# Patient Record
Sex: FEMALE | Race: OTHER | NOT HISPANIC OR LATINO | Employment: OTHER | URBAN - METROPOLITAN AREA
[De-identification: names, ages, dates, MRNs, and addresses within clinical notes are randomized per-mention and may not be internally consistent; named-entity substitution may affect disease eponyms.]

---

## 2018-11-21 ENCOUNTER — APPOINTMENT (EMERGENCY)
Dept: CT IMAGING | Facility: HOSPITAL | Age: 83
End: 2018-11-21
Payer: MEDICARE

## 2018-11-21 ENCOUNTER — APPOINTMENT (EMERGENCY)
Dept: RADIOLOGY | Facility: HOSPITAL | Age: 83
End: 2018-11-21
Payer: MEDICARE

## 2018-11-21 ENCOUNTER — HOSPITAL ENCOUNTER (EMERGENCY)
Facility: HOSPITAL | Age: 83
Discharge: HOME | End: 2018-11-21
Attending: EMERGENCY MEDICINE | Admitting: EMERGENCY MEDICINE
Payer: MEDICARE

## 2018-11-21 VITALS
WEIGHT: 115.74 LBS | RESPIRATION RATE: 20 BRPM | DIASTOLIC BLOOD PRESSURE: 84 MMHG | TEMPERATURE: 98.5 F | OXYGEN SATURATION: 96 % | SYSTOLIC BLOOD PRESSURE: 138 MMHG | HEART RATE: 60 BPM

## 2018-11-21 DIAGNOSIS — S42.212A CLOSED FRACTURE OF NECK OF LEFT HUMERUS, INITIAL ENCOUNTER: Primary | ICD-10-CM
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
## 2018-11-21 PROCEDURE — 71045 X-RAY EXAM CHEST 1 VIEW: CPT

## 2018-11-21 PROCEDURE — 70450 CT HEAD/BRAIN W/O DYE: CPT

## 2018-11-21 PROCEDURE — 73060 X-RAY EXAM OF HUMERUS: CPT

## 2018-11-21 PROCEDURE — 99284 EMERGENCY DEPT VISIT MOD MDM: CPT

## 2018-11-21 PROCEDURE — 73030 X-RAY EXAM OF SHOULDER: CPT

## 2018-11-21 PROCEDURE — 72125 CT NECK SPINE W/O DYE: CPT

## 2018-11-21 RX ORDER — ACETAMINOPHEN 325 MG/1
975 TABLET ORAL ONCE
Status: DISCONTINUED | OUTPATIENT
Start: 2018-11-21 | End: 2018-11-21 | Stop reason: HOSPADM

## 2018-11-21 NOTE — ED PROVIDER NOTES
History  Chief Complaint   Patient presents with    Arm Injury     Pt presents to ER with c/o's LUE pain, pt reports falling early this morning & sustaining injury to LUE, LUE grossly edematous & ecchymotic, + neurovascular checks  Generally healthy appearing frail elderly female presents in no distress  35-year-old female with a history of hypertension presenting with her son for evaluation of left arm injury  Patient is very well for stated age independent, she lives alone although her son lives right next door and checks on her frequently, this morning around 5 o'clock she went to get out of bed she states that she stumbled and fell landing on her left shoulder she does not believe that she struck her head, she had immediate pain discomfort localized to her proximal left arm, she denies losing conscious or syncopizing and she was able to get up unassisted after less than 5 minutes  Patient's family was home during the day, she contact her son brought the emergency department for evaluation of left arm injury  Patient's area of concern is her left shoulder and humerus, she has mild pain it radiates into her posterior back and neck and down into her distal arm is worse when she tries to move in abduct the arm this reproduces her symptoms exactly is better at rest she has not taken anything for this she denies having headache weakness paresthesias or anesthesia auditory visual or balance complaint she denies having chest pain cough shortness of breath nausea vomiting anorexia change in bowels or bladder other muscle skeletal complaint or injury  Complete review systems otherwise negative as noted  None       Past Medical History:   Diagnosis Date    Disease of thyroid gland     Hypertension        History reviewed  No pertinent surgical history  History reviewed  No pertinent family history  I have reviewed and agree with the history as documented      Social History   Substance Use Topics    Smoking status: Not on file    Smokeless tobacco: Not on file    Alcohol use Not on file        Review of Systems   Constitutional: Negative for activity change, appetite change, chills and fever  HENT: Negative for rhinorrhea and sore throat  Eyes: Negative for photophobia and pain  Respiratory: Negative for cough and shortness of breath  Cardiovascular: Negative for chest pain and palpitations  Gastrointestinal: Negative for abdominal pain, diarrhea, nausea and vomiting  Genitourinary: Negative for dysuria, frequency and urgency  Musculoskeletal: Positive for joint swelling and neck stiffness  Negative for arthralgias, back pain, myalgias and neck pain  Left shoulder pain   Skin: Positive for color change  Negative for rash and wound  Neurological: Negative for dizziness, weakness, light-headedness, numbness and headaches  Hematological: Negative for adenopathy  Does not bruise/bleed easily  Psychiatric/Behavioral: Negative for agitation and behavioral problems  All other systems reviewed and are negative  Physical Exam  Physical Exam   Constitutional: She is oriented to person, place, and time  She appears well-developed and well-nourished  No distress  Very well for stated age, sitting upright pleasant conversational no acute distress son at bedside   HENT:   Head: Normocephalic and atraumatic  Right Ear: External ear normal    Left Ear: External ear normal    Mouth/Throat: Oropharynx is clear and moist    Head neck and face are atraumatic and nontender   Eyes: Pupils are equal, round, and reactive to light  EOM are normal    Neck: Normal range of motion  Neck supple  No tracheal deviation present  Cardiovascular: Normal rate, regular rhythm and normal heart sounds  Exam reveals no gallop and no friction rub  No murmur heard  Pulmonary/Chest: Effort normal and breath sounds normal  She has no wheezes  She has no rales  Abdominal: Soft   Bowel sounds are normal  She exhibits no distension  There is no tenderness  There is no rebound and no guarding  Musculoskeletal:   Complete head-to-toe musculoskeletal exam significant for moderate ecchymosis/bruising over her left proximal humerus shoulder there is no discrete tenderness over the left clavicle or scapula there is no tenderness over her distal humerus elbow forearm wrist or hand she has 2+ symmetric pulses the distal arms neurovascularly intact including ain, pin, sensation all 3 distributions in the skin is closed circumferentially around her left proximal humerus injury, she has some mild tenderness in her cervical paraspinal musculature but no midline cervical thoracic or lumbar tenderness the remainder of her back chest abdomen or atraumatic and nontender she has full active and passive range of motion of her right upper extremity bilateral lower extremities without additional pain tenderness or injury noted no other acute ischemic infectious inflammatory traumatic findings on exam   Neurological: She is alert and oriented to person, place, and time  No cranial nerve deficit  She exhibits normal muscle tone  Coordination normal    Skin: Skin is warm and dry  No rash noted  Psychiatric: She has a normal mood and affect  Her behavior is normal    Nursing note and vitals reviewed        Vital Signs  ED Triage Vitals [11/21/18 1749]   Temperature Pulse Respirations Blood Pressure SpO2   98 5 °F (36 9 °C) 60 20 138/84 96 %      Temp Source Heart Rate Source Patient Position - Orthostatic VS BP Location FiO2 (%)   Oral Monitor Lying Right arm --      Pain Score       --           Vitals:    11/21/18 1749   BP: 138/84   Pulse: 60   Patient Position - Orthostatic VS: Lying       Visual Acuity      ED Medications  Medications - No data to display    Diagnostic Studies  Results Reviewed     None                 XR chest 1 view   ED Interpretation by Harlan Macias DO (11/21 1907)   Left shoulder fracture no other acute abnormality      Final Result by Franco Camejo DO (11/21 2050)      Enlargement of the aortic arch and descending thoracic aorta suspicious for aneurysmal dilatation  Computed tomography of the chest recommended  Humeral head/neck fracture  See separate dedicated images  The study was marked in [facility] for immediate notification  Workstation performed: [id number]         XR shoulder 2+ views LEFT   ED Interpretation by Jinx Nyhan, DO (11/21 1908)   Left shoulder fracture, located      Final Result by Franco Camejo DO (11/21 2049)      Comminuted displaced humeral head/neck fracture  Workstation performed: [id number]         XR humerus LEFT   ED Interpretation by Jinx Nyhan, DO (11/21 1908)   Left shoulder fracture      Final Result by Franco Camejo DO (11/21 2048)      Comminuted displaced humeral neck fracture  Multiple fractures of the humeral head  Workstation performed: [id number]         CT spine cervical without contrast   Final Result by Franco Camejo DO (11/21 1916)      Cervical degenerative change  No acute osseous abnormality  Workstation performed: [id number]         CT head without contrast   Final Result by Franco Camejo DO (11/21 1913)      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 with no evidence of hemorrhage or acute ischemia  Dolichoectasia of the vertebrobasilar system  1 cm meningioma in the left insular region  Diffuse sinus disease including an air-fluid level within the left maxillary sinus which may represent an acute component of sinusitis                    Workstation performed: [id number]                    Procedures  Procedures       Phone Contacts  ED Phone Contact    ED Course  ED Course as of Nov 24 1943 Wed Nov 21, 2018 2015 Patient offered admission multiple times, she makes her own decisions she is able to ambulate around the department that discomfort she has no pain currently declined Tylenol, patient's son reports that she makes her own decisions has no other injuries complaints or concerns and feels safe going home for now after discussion will put in referral for ambulatory home health orthopedic follow-up close return follow-up instruction all in agreement to plan    2016 Left shoulder immobilizer applied by technician neurovascularly intact after evaluation by myself                                MDM  Number of Diagnoses or Management Options  Closed fracture of neck of left humerus, initial encounter:   Fall, initial encounter:   Diagnosis management comments: 55-year-old female mechanical fall earlier this morning landing on left shoulder does not believe she struck her head estimates she was on the ground for several minutes able to get up off the ground unassisted presents later this afternoon with her son for evaluation as he was able to bring her to the hospital complaining of left shoulder pain, radiates into her immediate posterior shoulder and neck is worse with movement it is better with rest she has no weakness paresthesias or anesthesia she has no headache or neurologic symptoms she denies syncopizinf  or being on the ground for prolonged period of time, on exam here she is afebrile normal vital signs she is clinically well-appearing, she is comfortable her exam is otherwise as noted, reviewed x-rays at bedside with patient and family, placed in left shoulder immobilizer by technician neurovascularly intact after evaluation by myself patient able to ambulate unassisted and wishes to go home despite offering admission multiple times, son adds that he lives next door and she makes her own decisions currently, patient is declining pain medication home health referral will be made for assistance at home, orthopedic follow-up very close return instructions at any time should she developed worsening or other concerning symptoms or fell at home, patient family agreeable plan    CritCare Time    Disposition  Final diagnoses:   Closed fracture of neck of left humerus, initial encounter   Fall, initial encounter     Time reflects when diagnosis was documented in both MDM as applicable and the Disposition within this note     Time User Action Codes Description Comment    11/21/2018  7:51 PM Lore Odell Melvin [[id number]] Closed fracture of neck of left humerus, initial encounter     11/21/2018  7:51 PM Marcos Model Olguin Chimera  XXXA] Fall, initial encounter       ED Disposition     ED Disposition Condition Comment    Discharge  Pennie Sneed discharge to home/self care  Condition at discharge: Good        Follow-up Information     Follow up With Specialties Details Why Contact Info Additional Information    [facility] Emergency [facility]  If symptoms worsen [address] [facility]  [phone] [place] ED, [address], [place], [address] [facility] [place] [facility] In 3 days  [address] [facility] [id number]  [address], [address], [place], [place], [address]          There are no discharge medications for this patient  Outpatient Discharge Orders  Ambulatory Referral to Home Health   Standing Status: Future  Standing Exp   Date: 05/21/19         ED Provider  Electronically Signed by           Abundio Dela Cruz DO  11/24/18 1941

## 2018-11-22 NOTE — DISCHARGE INSTRUCTIONS
Please return if she has pain not controlled home her arms number week she has other injuries other falls you have any concerns at all otherwise please follow up with home health referral and please stay Orthopedics early next week for further evaluation management treatment of her left arm fracture      Arm Fracture in Adults   WHAT Nehemias:   An arm fracture is a crack or break in one or more of the bones in your arm  An arm fracture may be caused by a fall onto an outstretched hand  It may also be caused by trauma from a car accident or a sports injury  Osteoporosis (brittle bones) can increase your risk for a fracture  DISCHARGE INSTRUCTIONS:   Return to the emergency department if:   · The pain in your injured arm does not get better or gets worse, even after you rest and take medicine  · Your injured arm, hand, or fingers feel numb  · Your arm is swollen, red, and feels warm  · Your skin over the arm fracture is swollen, cold, or pale  · You cannot move your arm, hand, or fingers  Contact your healthcare provider if:   · You have a fever  · Your brace or splint becomes wet, damaged, or comes off  · You have questions or concerns about your injury, treatment, or care  Medicines:   · NSAIDs , such as ibuprofen, help decrease swelling and pain  This medicine is available with or without a doctor's order  NSAIDs can cause stomach bleeding or kidney problems in certain people  If you take blood thinner medicine, always ask your healthcare provider if NSAIDs are safe for you  Always read the medicine label and follow directions  · Acetaminophen  decreases pain  It is available without a doctor's order  Ask how much to take and how often to take it  Follow directions  Acetaminophen can cause liver damage if not taken correctly  · Prescription pain medicine  may be given  Ask how to take this medicine safely  · Take your medicine as directed    Contact your healthcare provider if you think your medicine is not helping or if you have side effects  Tell him or her if you are allergic to any medicine  Keep a list of the medicines, vitamins, and herbs you take  Include the amounts, and when and why you take them  Bring the list or the pill bottles to follow-up visits  Carry your medicine list with you in case of an emergency  Follow up with your healthcare provider within 1 week: You may need to see a bone specialist within 3 to 4 days if you need surgery or further treatment for your arm fracture  Write down your questions so you remember to ask them during your visits  Rest:  You should rest your arm as much as possible  Ask your healthcare provider when you can put pressure or weight on your arm  Also ask when you can return to sports or vigorous exercises  Ice:  Apply ice on your arm for 15 to 20 minutes every hour or as directed  Use an ice pack, or put crushed ice in a plastic bag  Cover it with a towel  Ice helps prevent tissue damage and decreases swelling and pain  Elevate:  Elevate your arm above the level of your heart as often as you can  This will help decrease swelling and pain  Prop your arm on pillows or blankets to keep it elevated comfortably  Care for your cast or splint:  Ask your healthcare provider when it is okay to bathe  Do not get your cast or splint wet  Before you take a bath or shower, cover your cast or splint with a plastic bag  Tape the bag to your skin to help keep water out  Hold your arm away from the water in case the bag leaks  · Check the skin around your cast or splint each day for any redness or open skin  · Do not use a sharp or pointed object to scratch your skin under the cast or splint  Physical therapy:  A physical therapist teaches you exercises to help improve movement and strength, and to decrease pain     © 2017 [employer] Information is for End User's use only and may not be sold, redistributed or otherwise used for commercial purposes  All illustrations and images included in CareNotes® are the copyrighted property of A D A M , Inc  or Karthik Mason  The above information is an  only  It is not intended as medical advice for individual conditions or treatments  Talk to your doctor, nurse or pharmacist before following any medical regimen to see if it is safe and effective for you

## 2018-11-23 NOTE — SOCIAL WORK
CM received call from pt's family requesting [address] and assistance as pt has broken arm  Per pt's daughter in law, Amye Courts they can assist through weekend then need to return to work  CM discussed HHC and services provided and explained HHA private pay  CM offered resources  Pt's daughter in law reported she is open to [facility] for [address] but they are unable to private pay for HHA  CM explained process to send referrals and cb with first available SOC  Pt's daughter in law in agreement to plan  CM sent referrals  CM s/w pt's daughter in law, [employer] and explained Pheobe Dionne can provide [facility] on Sunday  Rashaad will setup RN/PT/HHA  HHA will be twice per week for one hour to assist with bathing  Pt's daughter in law in agreement and provided with all information       CM faxed GEREMIAS Capps  11/23/18  12:18PM

## 2018-11-30 ENCOUNTER — APPOINTMENT (OUTPATIENT)
Dept: RADIOLOGY | Facility: CLINIC | Age: 83
End: 2018-11-30
Payer: MEDICARE

## 2018-11-30 ENCOUNTER — APPOINTMENT (OUTPATIENT)
Dept: LAB | Facility: HOSPITAL | Age: 83
End: 2018-11-30
Attending: ORTHOPAEDIC SURGERY
Payer: MEDICARE

## 2018-11-30 ENCOUNTER — HOSPITAL ENCOUNTER (OUTPATIENT)
Dept: RADIOLOGY | Facility: HOSPITAL | Age: 83
Discharge: HOME | End: 2018-11-30
Attending: ORTHOPAEDIC SURGERY
Payer: MEDICARE

## 2018-11-30 ENCOUNTER — OFFICE VISIT (OUTPATIENT)
Dept: OBGYN CLINIC | Facility: CLINIC | Age: 83
End: 2018-11-30
Payer: MEDICARE

## 2018-11-30 ENCOUNTER — TELEPHONE (OUTPATIENT)
Dept: OBGYN CLINIC | Facility: HOSPITAL | Age: 83
End: 2018-11-30

## 2018-11-30 VITALS — HEIGHT: 60 IN | WEIGHT: 115 LBS | BODY MASS INDEX: 22.58 KG/M2

## 2018-11-30 DIAGNOSIS — S42.202A UNSPECIFIED FRACTURE OF UPPER END OF LEFT HUMERUS, INITIAL ENCOUNTER FOR CLOSED FRACTURE: ICD-10-CM

## 2018-11-30 DIAGNOSIS — S42.292A CLOSED 4-PART FRACTURE OF PROXIMAL HUMERUS, LEFT, INITIAL ENCOUNTER: Primary | ICD-10-CM

## 2018-11-30 DIAGNOSIS — Z01.818 ENCOUNTER FOR PREADMISSION TESTING: Primary | ICD-10-CM

## 2018-11-30 DIAGNOSIS — Z01.818 ENCOUNTER FOR PREADMISSION TESTING: ICD-10-CM

## 2018-11-30 LAB
ABO GROUP BLD: NORMAL
ANION GAP SERPL CALCULATED.3IONS-SCNC: 8 MMOL/L (ref 4–13)
APTT PPP: 29 SECONDS (ref 26–38)
ATRIAL RATE: 50 BPM
BASOPHILS # BLD AUTO: 0.04 THOUSANDS/ΜL (ref 0–0.1)
BASOPHILS NFR BLD AUTO: 1 % (ref 0–1)
BLD GP AB SCN SERPL QL: NEGATIVE
BUN SERPL-MCNC: 30 MG/DL (ref 5–25)
CALCIUM SERPL-MCNC: 10 MG/DL (ref 8.3–10.1)
CHLORIDE SERPL-SCNC: 109 MMOL/L (ref 100–108)
CO2 SERPL-SCNC: 26 MMOL/L (ref 21–32)
CREAT SERPL-MCNC: 1.4 MG/DL (ref 0.6–1.3)
CRP SERPL QL: 14.4 MG/L
EOSINOPHIL # BLD AUTO: 0.07 THOUSAND/ΜL (ref 0–0.61)
EOSINOPHIL NFR BLD AUTO: 1 % (ref 0–6)
ERYTHROCYTE [DISTWIDTH] IN BLOOD BY AUTOMATED COUNT: 13.9 % (ref 11.6–15.1)
EST. AVERAGE GLUCOSE BLD GHB EST-MCNC: 91 MG/DL
FERRITIN SERPL-MCNC: 293 NG/ML (ref 8–388)
GFR SERPL CREATININE-BSD FRML MDRD: 33 ML/MIN/1.73SQ M
GLUCOSE P FAST SERPL-MCNC: 105 MG/DL (ref 65–99)
HBA1C MFR BLD: 4.8 % (ref 4.2–6.3)
HCT VFR BLD AUTO: 33.9 % (ref 34.8–46.1)
HGB BLD-MCNC: 11 G/DL (ref 11.5–15.4)
IMM GRANULOCYTES # BLD AUTO: 0.02 THOUSAND/UL (ref 0–0.2)
IMM GRANULOCYTES NFR BLD AUTO: 0 % (ref 0–2)
INR PPP: 1.35 (ref 0.86–1.17)
IRON SATN MFR SERPL: 22 %
IRON SERPL-MCNC: 51 UG/DL (ref 50–170)
LYMPHOCYTES # BLD AUTO: 1.14 THOUSANDS/ΜL (ref 0.6–4.47)
LYMPHOCYTES NFR BLD AUTO: 23 % (ref 14–44)
MCH RBC QN AUTO: 32.6 PG (ref 26.8–34.3)
MCHC RBC AUTO-ENTMCNC: 32.4 G/DL (ref 31.4–37.4)
MCV RBC AUTO: 101 FL (ref 82–98)
MONOCYTES # BLD AUTO: 0.5 THOUSAND/ΜL (ref 0.17–1.22)
MONOCYTES NFR BLD AUTO: 10 % (ref 4–12)
NEUTROPHILS # BLD AUTO: 3.21 THOUSANDS/ΜL (ref 1.85–7.62)
NEUTS SEG NFR BLD AUTO: 65 % (ref 43–75)
NRBC BLD AUTO-RTO: 0 /100 WBCS
P AXIS: 66 DEGREES
PLATELET # BLD AUTO: 153 THOUSANDS/UL (ref 149–390)
PMV BLD AUTO: 10.2 FL (ref 8.9–12.7)
POTASSIUM SERPL-SCNC: 4.1 MMOL/L (ref 3.5–5.3)
PR INTERVAL: 162 MS
PROTHROMBIN TIME: 16.5 SECONDS (ref 11.8–14.2)
QRS AXIS: -41 DEGREES
QRSD INTERVAL: 122 MS
QT INTERVAL: 464 MS
QTC INTERVAL: 423 MS
RBC # BLD AUTO: 3.37 MILLION/UL (ref 3.81–5.12)
RH BLD: POSITIVE
SODIUM SERPL-SCNC: 143 MMOL/L (ref 136–145)
SPECIMEN EXPIRATION DATE: NORMAL
T WAVE AXIS: 62 DEGREES
TIBC SERPL-MCNC: 229 UG/DL (ref 250–450)
VENTRICULAR RATE: 50 BPM
WBC # BLD AUTO: 4.98 THOUSAND/UL (ref 4.31–10.16)

## 2018-11-30 PROCEDURE — 86900 BLOOD TYPING SEROLOGIC ABO: CPT

## 2018-11-30 PROCEDURE — 93005 ELECTROCARDIOGRAM TRACING: CPT

## 2018-11-30 PROCEDURE — 86901 BLOOD TYPING SEROLOGIC RH(D): CPT

## 2018-11-30 PROCEDURE — 85730 THROMBOPLASTIN TIME PARTIAL: CPT

## 2018-11-30 PROCEDURE — 80048 BASIC METABOLIC PNL TOTAL CA: CPT

## 2018-11-30 PROCEDURE — 83540 ASSAY OF IRON: CPT

## 2018-11-30 PROCEDURE — 36415 COLL VENOUS BLD VENIPUNCTURE: CPT

## 2018-11-30 PROCEDURE — 85025 COMPLETE CBC W/AUTO DIFF WBC: CPT

## 2018-11-30 PROCEDURE — 83036 HEMOGLOBIN GLYCOSYLATED A1C: CPT

## 2018-11-30 PROCEDURE — 85610 PROTHROMBIN TIME: CPT

## 2018-11-30 PROCEDURE — 99204 OFFICE O/P NEW MOD 45 MIN: CPT | Performed by: ORTHOPAEDIC SURGERY

## 2018-11-30 PROCEDURE — 86140 C-REACTIVE PROTEIN: CPT

## 2018-11-30 PROCEDURE — 93010 ELECTROCARDIOGRAM REPORT: CPT | Performed by: INTERNAL MEDICINE

## 2018-11-30 PROCEDURE — 82728 ASSAY OF FERRITIN: CPT

## 2018-11-30 PROCEDURE — 83550 IRON BINDING TEST: CPT

## 2018-11-30 PROCEDURE — 73030 X-RAY EXAM OF SHOULDER: CPT

## 2018-11-30 PROCEDURE — 86850 RBC ANTIBODY SCREEN: CPT

## 2018-11-30 RX ORDER — PROPRANOLOL HYDROCHLORIDE 160 MG/1
160 CAPSULE, EXTENDED RELEASE ORAL DAILY
COMMUNITY
Start: 2018-11-26

## 2018-11-30 RX ORDER — BIOTIN 1 MG
TABLET ORAL DAILY
COMMUNITY

## 2018-11-30 RX ORDER — MELOXICAM 7.5 MG/1
TABLET ORAL
Refills: 5 | COMMUNITY
Start: 2018-11-12 | End: 2018-12-14 | Stop reason: HOSPADM

## 2018-11-30 RX ORDER — LEVOTHYROXINE SODIUM 0.03 MG/1
25 TABLET ORAL DAILY
COMMUNITY
Start: 2018-08-18

## 2018-11-30 NOTE — TELEPHONE ENCOUNTER
Dr Tamela Cummins,  I just called Teofilo Bowlingy, who is this patient's daughter-in-law  She told me that you are going to book this case as an inpatient so she can at least have one night stay  If this is true, I told her at that point we will get Case Management involved and get her set up for rehab rather than go home  I just want to make you aware and make sure that you are broken this for an overnight stay    Thank you    Iglesia Vidales

## 2018-11-30 NOTE — TELEPHONE ENCOUNTER
Patient was seen today by Dr Johana Francois  Her daughter in law Young Miller is calling to speak to the doctor about rehab after the surgery  She would be home alone and they are asking if rehab would be a good option for her?

## 2018-12-03 ENCOUNTER — OFFICE VISIT (OUTPATIENT)
Dept: CARDIOLOGY CLINIC | Facility: CLINIC | Age: 83
End: 2018-12-03
Payer: MEDICARE

## 2018-12-03 VITALS
BODY MASS INDEX: 22.97 KG/M2 | HEART RATE: 60 BPM | RESPIRATION RATE: 18 BRPM | DIASTOLIC BLOOD PRESSURE: 78 MMHG | OXYGEN SATURATION: 93 % | WEIGHT: 117 LBS | SYSTOLIC BLOOD PRESSURE: 122 MMHG | HEIGHT: 60 IN

## 2018-12-03 DIAGNOSIS — R94.31 EKG, ABNORMAL: ICD-10-CM

## 2018-12-03 DIAGNOSIS — I10 ESSENTIAL HYPERTENSION: ICD-10-CM

## 2018-12-03 DIAGNOSIS — R01.1 SYSTOLIC MURMUR: ICD-10-CM

## 2018-12-03 DIAGNOSIS — Z01.818 PRE-OP EVALUATION: Primary | ICD-10-CM

## 2018-12-03 DIAGNOSIS — S42.292A CLOSED 4-PART FRACTURE OF PROXIMAL HUMERUS, LEFT, INITIAL ENCOUNTER: ICD-10-CM

## 2018-12-03 PROCEDURE — 93000 ELECTROCARDIOGRAM COMPLETE: CPT | Performed by: INTERNAL MEDICINE

## 2018-12-03 PROCEDURE — 99203 OFFICE O/P NEW LOW 30 MIN: CPT | Performed by: INTERNAL MEDICINE

## 2018-12-03 RX ORDER — CHLORHEXIDINE GLUCONATE 4 G/100ML
SOLUTION TOPICAL DAILY PRN
Status: CANCELLED | OUTPATIENT
Start: 2018-12-03

## 2018-12-03 NOTE — LETTER
December 3, 2018     Alejandra Peña MD  [address] [facility] [address]  [facility] [address]    Patient: Keisha Quintero   YOB: 1926   Date of Visit: 12/3/2018       Dear Dr Cedric Jacob: Thank you for referring Keisha Quintero to me for evaluation  Below are my notes for this consultation  If you have questions, please do not hesitate to call me  I look forward to following your patient along with you  Sincerely,        Chavez Cedillo MD        CC: No Recipients  Chavez Cedillo MD  12/3/2018  3:14 PM  Sign at close encounter                                             Cardiology Consultation     Keisha Quintero  [id number]  8/29/1926  [address] [facility]  [address] [facility] [address]    1  Pre-op evaluation  POCT ECG     Chief complaints: Preop evaluation prior to left humerus surgery  History of present illness:  80-year-old female patient with past medical history of hypertension and hypothyroidism is here for preop cardiac evaluation prior to shoulder surgery  History was obtained from patient and her daughter-in-law  Patient apparently had a mechanical fall following which she sustained fracture of left humerus  She denies having any dizziness or syncope  Patient has unsteady gait and is active  She denies having any chest pain or shortness of breath on exertion  She denies having any lower extremity edema, orthopnea paroxysmal nocturnal dyspnea  EKG shows normal sinus rhythm, left axis deviation, anteroseptal infarct, poor R-wave progression, nonspecific T-wave changes  Patient Active Problem List   Diagnosis    Closed 4-part fracture of proximal humerus, left, initial encounter     Past Medical History:   Diagnosis Date    Disease of thyroid gland     Hypertension      Social History     Social History    Marital status:       Spouse name: N/A    Number of children: N/A    Years of education: N/A Occupational History    Not on file       Social History Main Topics    Smoking status: Never Smoker    Smokeless tobacco: Never Used    Alcohol use Not on file    Drug use: Unknown    Sexual activity: Not on file     Other Topics Concern    Not on file     Social History Narrative    No narrative on file      Family History   Problem Relation Age of Onset    No Known Problems Mother     No Known Problems Father      Past Surgical History:   Procedure Laterality Date    EYE SURGERY         Current Outpatient Prescriptions:     aspirin 81 MG tablet, Take 81 mg by mouth daily, Disp: , Rfl:     Cholecalciferol (VITAMIN D3) 1000 units CAPS, Daily, Disp: , Rfl:     levothyroxine 25 mcg tablet, , Disp: , Rfl:     meloxicam (MOBIC) 7 5 mg tablet, 1 BY MOUTH TWICE A DAY WITH FOOD AS NEEDED, Disp: , Rfl: 5    Potassium 75 MG TABS, potassium, Disp: , Rfl:     propranolol (INDERAL LA) 160 mg, , Disp: , Rfl:   No Known Allergies  Vitals:    12/03/18 1440   BP: 122/78   Pulse: 60   Resp: 18   SpO2: 93%   Weight: 53 1 kg (117 lb)   Height: 5' (1 524 m)       Labs:  Appointment on 11/30/2018   Component Date Value    Sodium 11/30/2018 143     Potassium 11/30/2018 4 1     Chloride 11/30/2018 109*    CO2 11/30/2018 26     ANION GAP 11/30/2018 8     BUN 11/30/2018 30*    Creatinine 11/30/2018 1 40*    Glucose, Fasting 11/30/2018 105*    Calcium 11/30/2018 10 0     eGFR 11/30/2018 33     WBC 11/30/2018 4 98     RBC 11/30/2018 3 37*    Hemoglobin 11/30/2018 11 0*    Hematocrit 11/30/2018 33 9*    MCV 11/30/2018 101*    MCH 11/30/2018 32 6     MCHC 11/30/2018 32 4     RDW 11/30/2018 13 9     MPV 11/30/2018 10 2     Platelets 50/52/4093 153     nRBC 11/30/2018 0     Neutrophils Relative 11/30/2018 65     Immat GRANS % 11/30/2018 0     Lymphocytes Relative 11/30/2018 23     Monocytes Relative 11/30/2018 10     Eosinophils Relative 11/30/2018 1     Basophils Relative 11/30/2018 1     Neutrophils Absolute 11/30/2018 3 21     Immature Grans Absolute 11/30/2018 0 02     Lymphocytes Absolute 11/30/2018 1 14     Monocytes Absolute 11/30/2018 0 50     Eosinophils Absolute 11/30/2018 0 07     Basophils Absolute 11/30/2018 0 04     CRP 11/30/2018 14 4*    Protime 11/30/2018 16 5*    INR 11/30/2018 1 35*    PTT 11/30/2018 29     Hemoglobin A1C 11/30/2018 4 8     EAG 11/30/2018 91     ABO Grouping 11/30/2018 O     Rh Factor 11/30/2018 Positive     Antibody Screen 11/30/2018 Negative     Specimen Expiration Date 11/30/2018 30916920     Iron Saturation 11/30/2018 22     TIBC 11/30/2018 229*    Iron 11/30/2018 51     Ferritin 11/30/2018 293    Office Visit on 11/30/2018   Component Date Value    Ventricular Rate 11/30/2018 50     Atrial Rate 11/30/2018 50     PA Interval 11/30/2018 162     QRSD Interval 11/30/2018 122     QT Interval 11/30/2018 464     QTC Interval 11/30/2018 423     P Axis 11/30/2018 66     QRS Axis 11/30/2018 -41     T Wave Axis 11/30/2018 62      Imaging: Xr Shoulder 2+ Vw Left    Result Date: 12/3/2018  Narrative: LEFT SHOULDER INDICATION:   S42 202A: Unspecified fracture of upper end of left humerus, initial encounter for closed fracture  COMPARISON:  November 21, 2018 VIEWS:  XR SHOULDER 2+ VW LEFT FINDINGS: There is a displaced comminuted fracture of the left humeral head and neck, little changed in appearance since the earlier exam   There is no dislocation  No significant degenerative changes  No lytic or blastic lesions are seen  Soft tissues are unremarkable  Impression: Unchanged appearance of a comminuted fracture of the left humeral head and neck  Workstation performed: [id number]     Xr Shoulder 2+ Views Left    Result Date: 11/21/2018  Narrative: LEFT SHOULDER INDICATION:   injury  COMPARISON:  None VIEWS:  XR SHOULDER 2+ VW LEFT Images: 3 FINDINGS: Comminuted fracture of the humeral head and neck    The humeral shaft is displaced anteriorly and medially in relation to the humeral head  No significant degenerative changes  No lytic or blastic lesions are seen  Soft tissues are unremarkable  Impression: Comminuted displaced humeral head/neck fracture  Workstation performed: [id number]     Xr Humerus Left    Result Date: 11/21/2018  Narrative: LEFT HUMERUS INDICATION:   Fracture    COMPARISON:  None VIEWS:  XR HUMERUS LEFT Images: 2 FINDINGS: Comminuted humeral neck fracture  Anterior and medial displacement of the humeral shaft in relation to the humeral head  Fractures extend into the lateral aspect of the humeral head  No degenerative changes  No lytic or blastic lesions are seen  Soft tissues are unremarkable  Impression: Comminuted displaced humeral neck fracture  Multiple fractures of the humeral head  Workstation performed: [id number]     Ct Head Without Contrast    Result Date: 11/21/2018  Narrative: CT BRAIN - WITHOUT CONTRAST INDICATION:   fall  COMPARISON:  None  TECHNIQUE:  CT examination of the brain was performed  In addition to axial images, coronal 2D reformatted images were created and submitted for interpretation  Radiation dose length product (DLP) for this visit:  880 mGy-cm   This examination, like all CT scans performed in the [facility], was performed utilizing techniques to minimize radiation dose exposure, including the use of iterative reconstruction and automated exposure control  IMAGE QUALITY:  Diagnostic  FINDINGS: PARENCHYMA:  Encephalomalacia identified within both occipital lobes as well as the right posterior lateral temporal lobe suggesting old infarcts  There is an old infarct within the right superior lateral frontal lobe  Extensive periventricular white matter hypoattenuation consistent with chronic microangiopathic change  No hemorrhage or definitive signs of acute territorial infarction   There is a small extra-axial mass identified within the left periinsular region measuring approximately 1 cm in craniocaudad dimension consistent with small meningioma  This causes no mass effect upon the adjacent brain parenchyma  Mild dolichoectasia of the vertebrobasilar system  VENTRICLES AND EXTRA-AXIAL SPACES:  Ventricles and extra-axial CSF spaces are prominent commensurate with the degree of volume loss  No hydrocephalus  No acute extra-axial hemorrhage  VISUALIZED ORBITS AND PARANASAL SINUSES:  Unremarkable orbits  Moderate mucosal thickening of the paranasal sinuses  The right maxillary sinus is hypoplastic  There is an air-fluid level within the left maxillary sinus  Wall thickening of the sphenoid sinus suggesting chronic osteitis  CALVARIUM AND EXTRACRANIAL SOFT TISSUES:  Normal      Impression: 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 with no evidence of hemorrhage or acute ischemia  Dolichoectasia of the vertebrobasilar system  1 cm meningioma in the left insular region  Diffuse sinus disease including an air-fluid level within the left maxillary sinus which may represent an acute component of sinusitis  Workstation performed: [id number]     Ct Spine Cervical Without Contrast    Result Date: 11/21/2018  Narrative: CT CERVICAL SPINE - WITHOUT CONTRAST INDICATION:   fall  COMPARISON:  None  TECHNIQUE:  CT examination of the cervical spine was performed without intravenous contrast   Contiguous axial images were obtained  Sagittal and coronal reconstructions were performed  Radiation dose length product (DLP) for this visit:  544 mGy-cm   This examination, like all CT scans performed in the [facility], was performed utilizing techniques to minimize radiation dose exposure, including the use of iterative reconstruction and automated exposure control  IMAGE QUALITY:  Diagnostic  FINDINGS: ALIGNMENT:  Sidebending towards the left  Slight anterior subluxation of C7 upon T1  VERTEBRAL BODIES:  Diffuse osteopenia  There is no fracture identified  DEGENERATIVE CHANGES:  Multifocal degenerative change including cystic change within the C2 vertebral body at the base of the dens possibly subchondral cystic degenerative change  Multilevel uncinate and facet hypertrophic change  PREVERTEBRAL AND PARASPINAL SOFT TISSUES:  Unremarkable  THORACIC INLET:  Normal      Impression: Cervical degenerative change  No acute osseous abnormality  Workstation performed: [id number]     Xr Chest 1 View    Result Date: 11/21/2018  Narrative: CHEST INDICATION:   fall  COMPARISON:  None EXAM PERFORMED/VIEWS:  XR CHEST 1 VIEW  The frontal view was performed utilizing dual energy radiographic technique  Images: 3 FINDINGS: Tortuosity of the aortic arch  Widening of the thoracic aorta suggestive of aneurysm  Heart is not enlarged  The lungs are clear  No pneumothorax or pleural effusion  Comminuted fracture of the left humeral head/neck  Impression: Enlargement of the aortic arch and descending thoracic aorta suspicious for aneurysmal dilatation  Computed tomography of the chest recommended  Humeral head/neck fracture  See separate dedicated images  The study was marked in [facility] for immediate notification  Workstation performed: [id number]       Review of Systems:  Review of Systems   Constitutional: Negative for diaphoresis and fatigue  HENT: Negative for congestion and facial swelling  Eyes: Negative for photophobia and visual disturbance  Respiratory: Negative for chest tightness and shortness of breath  Cardiovascular: Negative for chest pain, palpitations and leg swelling  Gastrointestinal: Negative for abdominal pain and nausea  Endocrine: Negative for cold intolerance and heat intolerance  Musculoskeletal: Negative for arthralgias and myalgias  Skin: Negative for pallor and rash  Neurological: Negative for dizziness and tremors  Psychiatric/Behavioral: Negative for sleep disturbance  The patient is not nervous/anxious          Physical Exam:  Physical Exam   Constitutional: She is oriented to person, place, and time  She appears well-developed and well-nourished  HENT:   Head: Normocephalic and atraumatic  Eyes: Pupils are equal, round, and reactive to light  Conjunctivae and EOM are normal    Neck: Normal range of motion  Neck supple  No JVD present  No thyromegaly present  Cardiovascular: Normal rate, regular rhythm, S1 normal, S2 normal and intact distal pulses  Exam reveals no gallop and no friction rub  Murmur heard  Systolic murmur is present with a grade of 2/6   Pulses:       Carotid pulses are 2+ on the right side, and 2+ on the left side  Pulmonary/Chest: Effort normal and breath sounds normal  No respiratory distress  She has no wheezes  She has no rales  Abdominal: Soft  Bowel sounds are normal  She exhibits no distension  There is no tenderness  There is no rebound and no guarding  Musculoskeletal: Normal range of motion  She exhibits no edema  Neurological: She is alert and oriented to person, place, and time  She has normal reflexes  No cranial nerve deficit  Skin: Skin is warm and dry  Psychiatric: She has a normal mood and affect  Discussion/Summary:  1  Preop evaluation prior to left humerus fracture surgery:  Patient achieves less than 8 METS of physical activity  BASELINE EKG IS ABNORMAL SUSPICIOUS FOR OLD ANTEROSEPTAL INFARCT  She also has systolic murmur which is probably due to aortic stenosis  Given advanced age and baseline abnormal EKG, she is at risk for having structural heart disease and significant CAD  I get echocardiogram to evaluate for any structural heart disease including significant systolic dysfunction or other valvular abnormalities  I will get Lexiscan stress test rule out ischemia prior to surgery  Further recommendations will follow after testing    2  Hypertension, blood pressure reasonable today    Follow-up p r n

## 2018-12-03 NOTE — PROGRESS NOTES
Cardiology Consultation     Talya Covert  [id number]  8/29/1926  [address] [place] [id number]    1  Pre-op evaluation  POCT ECG     Chief complaints: Preop evaluation prior to left humerus surgery  History of present illness:  58-year-old female patient with past medical history of hypertension and hypothyroidism is here for preop cardiac evaluation prior to shoulder surgery  History was obtained from patient and her daughter-in-law  Patient apparently had a mechanical fall following which she sustained fracture of left humerus  She denies having any dizziness or syncope  Patient has unsteady gait and is active  She denies having any chest pain or shortness of breath on exertion  She denies having any lower extremity edema, orthopnea paroxysmal nocturnal dyspnea  EKG shows normal sinus rhythm, left axis deviation, anteroseptal infarct, poor R-wave progression, nonspecific T-wave changes  Patient Active Problem List   Diagnosis    Closed 4-part fracture of proximal humerus, left, initial encounter     Past Medical History:   Diagnosis Date    Disease of thyroid gland     Hypertension      Social History     Social History    Marital status:      Spouse name: N/A    Number of children: N/A    Years of education: N/A     Occupational History    Not on file       Social History Main Topics    Smoking status: Never Smoker    Smokeless tobacco: Never Used    Alcohol use Not on file    Drug use: Unknown    Sexual activity: Not on file     Other Topics Concern    Not on file     Social History Narrative    No narrative on file      Family History   Problem Relation Age of Onset    No Known Problems Mother     No Known Problems Father      Past Surgical History:   Procedure Laterality Date    EYE SURGERY         Current Outpatient Prescriptions:     aspirin 81 MG tablet, Take 81 mg by mouth daily, Disp: , Rfl:     Cholecalciferol (VITAMIN D3) 1000 units CAPS, Daily, Disp: , Rfl:     levothyroxine 25 mcg tablet, , Disp: , Rfl:     meloxicam (MOBIC) 7 5 mg tablet, 1 BY MOUTH TWICE A DAY WITH FOOD AS NEEDED, Disp: , Rfl: 5    Potassium 75 MG TABS, potassium, Disp: , Rfl:     propranolol (INDERAL LA) 160 mg, , Disp: , Rfl:   No Known Allergies  Vitals:    12/03/18 1440   BP: 122/78   Pulse: 60   Resp: 18   SpO2: 93%   Weight: 53 1 kg (117 lb)   Height: 5' (1 524 m)       Labs:  Appointment on 11/30/2018   Component Date Value    Sodium 11/30/2018 143     Potassium 11/30/2018 4 1     Chloride 11/30/2018 109*    CO2 11/30/2018 26     ANION GAP 11/30/2018 8     BUN 11/30/2018 30*    Creatinine 11/30/2018 1 40*    Glucose, Fasting 11/30/2018 105*    Calcium 11/30/2018 10 0     eGFR 11/30/2018 33     WBC 11/30/2018 4 98     RBC 11/30/2018 3 37*    Hemoglobin 11/30/2018 11 0*    Hematocrit 11/30/2018 33 9*    MCV 11/30/2018 101*    MCH 11/30/2018 32 6     MCHC 11/30/2018 32 4     RDW 11/30/2018 13 9     MPV 11/30/2018 10 2     Platelets 42/83/7870 153     nRBC 11/30/2018 0     Neutrophils Relative 11/30/2018 65     Immat GRANS % 11/30/2018 0     Lymphocytes Relative 11/30/2018 23     Monocytes Relative 11/30/2018 10     Eosinophils Relative 11/30/2018 1     Basophils Relative 11/30/2018 1     Neutrophils Absolute 11/30/2018 3 21     Immature Grans Absolute 11/30/2018 0 02     Lymphocytes Absolute 11/30/2018 1 14     Monocytes Absolute 11/30/2018 0 50     Eosinophils Absolute 11/30/2018 0 07     Basophils Absolute 11/30/2018 0 04     CRP 11/30/2018 14 4*    Protime 11/30/2018 16 5*    INR 11/30/2018 1 35*    PTT 11/30/2018 29     Hemoglobin A1C 11/30/2018 4 8     EAG 11/30/2018 91     ABO Grouping 11/30/2018 O     Rh Factor 11/30/2018 Positive     Antibody Screen 11/30/2018 Negative     Specimen Expiration Date 11/30/2018 [id number]     Iron Saturation 11/30/2018 22     TIBC 11/30/2018 229*    Iron 11/30/2018 51     Ferritin 11/30/2018 293    Office Visit on 11/30/2018   Component Date Value    Ventricular Rate 11/30/2018 50     Atrial Rate 11/30/2018 50     NM Interval 11/30/2018 162     QRSD Interval 11/30/2018 122     QT Interval 11/30/2018 464     QTC Interval 11/30/2018 423     P Axis 11/30/2018 66     QRS Axis 11/30/2018 -41     T Wave Axis 11/30/2018 62      Imaging: Xr Shoulder 2+ Vw Left    Result Date: 12/3/2018  Narrative: LEFT SHOULDER INDICATION:   S42 202A: Unspecified fracture of upper end of left humerus, initial encounter for closed fracture  COMPARISON:  November 21, 2018 VIEWS:  XR SHOULDER 2+ VW LEFT FINDINGS: There is a displaced comminuted fracture of the left humeral head and neck, little changed in appearance since the earlier exam   There is no dislocation  No significant degenerative changes  No lytic or blastic lesions are seen  Soft tissues are unremarkable  Impression: Unchanged appearance of a comminuted fracture of the left humeral head and neck  Workstation performed: [id number]     Xr Shoulder 2+ Views Left    Result Date: 11/21/2018  Narrative: LEFT SHOULDER INDICATION:   injury  COMPARISON:  None VIEWS:  XR SHOULDER 2+ VW LEFT Images: 3 FINDINGS: Comminuted fracture of the humeral head and neck  The humeral shaft is displaced anteriorly and medially in relation to the humeral head  No significant degenerative changes  No lytic or blastic lesions are seen  Soft tissues are unremarkable  Impression: Comminuted displaced humeral head/neck fracture  Workstation performed: [id number]     Xr Humerus Left    Result Date: 11/21/2018  Narrative: LEFT HUMERUS INDICATION:   Fracture    COMPARISON:  None VIEWS:  XR HUMERUS LEFT Images: 2 FINDINGS: Comminuted humeral neck fracture  Anterior and medial displacement of the humeral shaft in relation to the humeral head    Fractures extend into the lateral aspect of the humeral head  No degenerative changes  No lytic or blastic lesions are seen  Soft tissues are unremarkable  Impression: Comminuted displaced humeral neck fracture  Multiple fractures of the humeral head  Workstation performed: [id number]     Ct Head Without Contrast    Result Date: 11/21/2018  Narrative: CT BRAIN - WITHOUT CONTRAST INDICATION:   fall  COMPARISON:  None  TECHNIQUE:  CT examination of the brain was performed  In addition to axial images, coronal 2D reformatted images were created and submitted for interpretation  Radiation dose length product (DLP) for this visit:  880 mGy-cm   This examination, like all CT scans performed in the [facility], was performed utilizing techniques to minimize radiation dose exposure, including the use of iterative reconstruction and automated exposure control  IMAGE QUALITY:  Diagnostic  FINDINGS: PARENCHYMA:  Encephalomalacia identified within both occipital lobes as well as the right posterior lateral temporal lobe suggesting old infarcts  There is an old infarct within the right superior lateral frontal lobe  Extensive periventricular white matter hypoattenuation consistent with chronic microangiopathic change  No hemorrhage or definitive signs of acute territorial infarction  There is a small extra-axial mass identified within the left periinsular region measuring approximately 1 cm in craniocaudad dimension consistent with small meningioma  This causes no mass effect upon the adjacent brain parenchyma  Mild dolichoectasia of the vertebrobasilar system  VENTRICLES AND EXTRA-AXIAL SPACES:  Ventricles and extra-axial CSF spaces are prominent commensurate with the degree of volume loss  No hydrocephalus  No acute extra-axial hemorrhage  VISUALIZED ORBITS AND PARANASAL SINUSES:  Unremarkable orbits  Moderate mucosal thickening of the paranasal sinuses  The right maxillary sinus is hypoplastic    There is an air-fluid level within the left maxillary sinus  Wall thickening of the sphenoid sinus suggesting chronic osteitis  CALVARIUM AND EXTRACRANIAL SOFT TISSUES:  Normal      Impression: 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 with no evidence of hemorrhage or acute ischemia  Dolichoectasia of the vertebrobasilar system  1 cm meningioma in the left insular region  Diffuse sinus disease including an air-fluid level within the left maxillary sinus which may represent an acute component of sinusitis  Workstation performed: [id number]     Ct Spine Cervical Without Contrast    Result Date: 11/21/2018  Narrative: CT CERVICAL SPINE - WITHOUT CONTRAST INDICATION:   fall  COMPARISON:  None  TECHNIQUE:  CT examination of the cervical spine was performed without intravenous contrast   Contiguous axial images were obtained  Sagittal and coronal reconstructions were performed  Radiation dose length product (DLP) for this visit:  544 mGy-cm   This examination, like all CT scans performed in the [facility], was performed utilizing techniques to minimize radiation dose exposure, including the use of iterative reconstruction and automated exposure control  IMAGE QUALITY:  Diagnostic  FINDINGS: ALIGNMENT:  Sidebending towards the left  Slight anterior subluxation of C7 upon T1  VERTEBRAL BODIES:  Diffuse osteopenia  There is no fracture identified  DEGENERATIVE CHANGES:  Multifocal degenerative change including cystic change within the C2 vertebral body at the base of the dens possibly subchondral cystic degenerative change  Multilevel uncinate and facet hypertrophic change  PREVERTEBRAL AND PARASPINAL SOFT TISSUES:  Unremarkable  THORACIC INLET:  Normal      Impression: Cervical degenerative change  No acute osseous abnormality  Workstation performed: [id number]     Xr Chest 1 View    Result Date: 11/21/2018  Narrative: CHEST INDICATION:   fall   COMPARISON:  None EXAM PERFORMED/VIEWS:  XR CHEST 1 VIEW  The frontal view was performed utilizing dual energy radiographic technique  Images: 3 FINDINGS: Tortuosity of the aortic arch  Widening of the thoracic aorta suggestive of aneurysm  Heart is not enlarged  The lungs are clear  No pneumothorax or pleural effusion  Comminuted fracture of the left humeral head/neck  Impression: Enlargement of the aortic arch and descending thoracic aorta suspicious for aneurysmal dilatation  Computed tomography of the chest recommended  Humeral head/neck fracture  See separate dedicated images  The study was marked in [facility] for immediate notification  Workstation performed: [id number]       Review of Systems:  Review of Systems   Constitutional: Negative for diaphoresis and fatigue  HENT: Negative for congestion and facial swelling  Eyes: Negative for photophobia and visual disturbance  Respiratory: Negative for chest tightness and shortness of breath  Cardiovascular: Negative for chest pain, palpitations and leg swelling  Gastrointestinal: Negative for abdominal pain and nausea  Endocrine: Negative for cold intolerance and heat intolerance  Musculoskeletal: Negative for arthralgias and myalgias  Skin: Negative for pallor and rash  Neurological: Negative for dizziness and tremors  Psychiatric/Behavioral: Negative for sleep disturbance  The patient is not nervous/anxious  Physical Exam:  Physical Exam   Constitutional: She is oriented to person, place, and time  She appears well-developed and well-nourished  HENT:   Head: Normocephalic and atraumatic  Eyes: Pupils are equal, round, and reactive to light  Conjunctivae and EOM are normal    Neck: Normal range of motion  Neck supple  No JVD present  No thyromegaly present  Cardiovascular: Normal rate, regular rhythm, S1 normal, S2 normal and intact distal pulses  Exam reveals no gallop and no friction rub  Murmur heard     Systolic murmur is present with a grade of 2/6   Pulses:       Carotid pulses are 2+ on the right side, and 2+ on the left side  Pulmonary/Chest: Effort normal and breath sounds normal  No respiratory distress  She has no wheezes  She has no rales  Abdominal: Soft  Bowel sounds are normal  She exhibits no distension  There is no tenderness  There is no rebound and no guarding  Musculoskeletal: Normal range of motion  She exhibits no edema  Neurological: She is alert and oriented to person, place, and time  She has normal reflexes  No cranial nerve deficit  Skin: Skin is warm and dry  Psychiatric: She has a normal mood and affect  Discussion/Summary:  1  Preop evaluation prior to left humerus fracture surgery:  Patient achieves less than 8 METS of physical activity  BASELINE EKG IS ABNORMAL SUSPICIOUS FOR OLD ANTEROSEPTAL INFARCT  She also has systolic murmur which is probably due to aortic stenosis  Given advanced age and baseline abnormal EKG, she is at risk for having structural heart disease and significant CAD  I get echocardiogram to evaluate for any structural heart disease including significant systolic dysfunction or other valvular abnormalities  I will get Lexiscan stress test rule out ischemia prior to surgery  Further recommendations will follow after testing    2  Hypertension, blood pressure reasonable today    Follow-up p r n

## 2018-12-03 NOTE — PROGRESS NOTES
HPI:  Patient is a 80y o  year old RHD female who presents with chief complaint of left proximal humerus fracture  Patient fell when she stumbled getting out of bed on 11/21/2018 she landed on her left shoulder and had immediate pain  She denies loss of consciousness  She was able to get up unassisted  Patient is a very healthy for her age  She lives alone  She was taken to the emergency room on the date of injury and x-rays reveal a 4 part displaced proximal humerus fracture  ROS:   General: No fever, no chills, no weight loss, no weight gain  HEENT:  No loss of hearing, no nose bleeds, no sore throat  Eyes:  No eye pain, no red eyes, no visual disturbance  Respiratory:  No cough, no shortness of breath, no wheezing  Cardiovascular:  No chest pain, no palpitations, no edema  GI: No abdominal pain, no nausea, no vomiting  Endocrine: No frequent urination, no excessive thirst  Urinary:  No dysuria, no hematuria, no incontinence  Musculoskeletal: see HPI and PE  Skin:  No rash, no wounds  Neurological:  No dizziness, no headache, no numbness  Psychiatric:  No difficulty concentrating, no depression, no suicide thoughts, no anxiety  Review of all other systems is negative    PMH:  Past Medical History:   Diagnosis Date    Disease of thyroid gland     Hypertension        PSH:  Past Surgical History:   Procedure Laterality Date    EYE SURGERY         Medications:  Current Outpatient Prescriptions   Medication Sig Dispense Refill    levothyroxine 25 mcg tablet       aspirin 81 MG tablet Take 81 mg by mouth daily      Cholecalciferol (VITAMIN D3) 1000 units CAPS Daily      meloxicam (MOBIC) 7 5 mg tablet 1 BY MOUTH TWICE A DAY WITH FOOD AS NEEDED  5    Potassium 75 MG TABS potassium      propranolol (INDERAL LA) 160 mg        No current facility-administered medications for this visit          Allergies:  No Known Allergies    Family History:  Family History   Problem Relation Age of Onset    No Known Problems Mother     No Known Problems Father        Social History:  Social History     Occupational History    Not on file  Social History Main Topics    Smoking status: Never Smoker    Smokeless tobacco: Not on file    Alcohol use Not on file    Drug use: Unknown    Sexual activity: Not on file       Physical Exam:  General :  Alert, cooperative, no distress, appears stated age  Height 5' (1 524 m), weight 52 2 kg (115 lb)  Head:  Normocephalic, without obvious abnormality, atraumatic   Eyes:  Conjunctiva/corneas clear, EOM's intact,   Ears: Both ears normal appearance, no hearing deficits  Nose: Nares normal, septum midline, no drainage    Neck: Supple,  trachea midline, no adenopathy, no tenderness, no mass   Back:   Symmetric, no curvature, ROM normal, no tenderness   Lungs:   Respirations unlabored, clear to auscultation   Cardiac:  Regular rate and rhythm   Extremities: Extremities normal, atraumatic, no cyanosis or edema      Pulses: 2+ and symmetric   Skin: Skin color, texture, turgor normal, no rashes or lesions      Neurologic: Normal           Left Shoulder Exam     Range of Motion   Active Abduction:                       N/t  Passive Abduction:                    N/t  Extension:                                  N/t  Forward Flexion:                        N/t  External Rotation:                      N/t  Internal Rotation 0 degrees:      N/t  Internal Rotation 90 degrees:    N/t    Muscle Strength   Abduction:            N/t  Internal Rotation:  N/t  External Rotation: n/t  Supraspinatus:     N/t  Subscapularis:     N/t  Biceps:                 N/t    Tests   Impingement:   N/t  Lazo:          N/t  Cross Arm:      N/t  Drop Arm:        N/t  Apprehension: n/t  Sulcus:            N/t    Comments:  Sensation intact to light touch in axillary, median radial and ulnar distributions  Diffuse tenderness and ecchymosis  Patient can shrug shoulder    Elbow flexion-extension, forearm pronation supination, wrist flexion-extension finger  abduction and  intact  Imaging Studies: The following imaging studies were reviewed in office today  My findings are noted  Left humerus x-ray 11/21/2018:  Comminuted displaced proximal humerus fracture with multiple fracture lines in the humeral head and fracture lines in the tuberosities  CT head 11/21/2018:  Old infarction diffuse chronic microangiopathy change with no evidence of hemorrhage or acute ischemia  1 cm meningioma left insular region  Sinusitis  CT cervical spine on 01/20/2018:  Multifocal degenerative change with uncinate and facet joint hypertrophy  Left shoulder x-rays 11/30/2018:  Four-part displaced proximal humerus fracture      Assessment  Encounter Diagnosis   Name Primary?  Closed 4-part fracture of proximal humerus, left, initial encounter Yes         Plan:  Risks and benefits of reverse total shoulder arthroplasty discussed in detail with patient and she wishes to proceed

## 2018-12-06 ENCOUNTER — HOSPITAL ENCOUNTER (OUTPATIENT)
Dept: NON INVASIVE DIAGNOSTICS | Facility: CLINIC | Age: 83
Discharge: HOME | End: 2018-12-06
Payer: MEDICARE

## 2018-12-06 ENCOUNTER — DOCUMENTATION (OUTPATIENT)
Dept: CARDIOLOGY CLINIC | Facility: CLINIC | Age: 83
End: 2018-12-06

## 2018-12-06 DIAGNOSIS — R01.1 SYSTOLIC MURMUR: ICD-10-CM

## 2018-12-06 DIAGNOSIS — I10 ESSENTIAL HYPERTENSION: ICD-10-CM

## 2018-12-06 DIAGNOSIS — R94.31 EKG, ABNORMAL: ICD-10-CM

## 2018-12-06 DIAGNOSIS — Z01.818 PRE-OP EVALUATION: ICD-10-CM

## 2018-12-06 LAB
MAX DIASTOLIC BP: 98 MMHG
MAX HEART RATE: 83 BPM
MAX PREDICTED HEART RATE: 128 BPM
MAX. SYSTOLIC BP: 162 MMHG
PROTOCOL NAME: NORMAL
REASON FOR TERMINATION: NORMAL
TARGET HR FORMULA: NORMAL
TIME IN EXERCISE PHASE: NORMAL

## 2018-12-06 PROCEDURE — 93325 DOPPLER ECHO COLOR FLOW MAPG: CPT | Performed by: INTERNAL MEDICINE

## 2018-12-06 PROCEDURE — 93018 CV STRESS TEST I&R ONLY: CPT | Performed by: INTERNAL MEDICINE

## 2018-12-06 PROCEDURE — 78452 HT MUSCLE IMAGE SPECT MULT: CPT | Performed by: INTERNAL MEDICINE

## 2018-12-06 PROCEDURE — 93321 DOPPLER ECHO F-UP/LMTD STD: CPT | Performed by: INTERNAL MEDICINE

## 2018-12-06 PROCEDURE — 93308 TTE F-UP OR LMTD: CPT | Performed by: INTERNAL MEDICINE

## 2018-12-06 PROCEDURE — 93016 CV STRESS TEST SUPVJ ONLY: CPT | Performed by: INTERNAL MEDICINE

## 2018-12-06 PROCEDURE — 78452 HT MUSCLE IMAGE SPECT MULT: CPT

## 2018-12-06 PROCEDURE — A9502 TC99M TETROFOSMIN: HCPCS

## 2018-12-06 PROCEDURE — 93017 CV STRESS TEST TRACING ONLY: CPT

## 2018-12-06 PROCEDURE — 93308 TTE F-UP OR LMTD: CPT

## 2018-12-06 NOTE — PROGRESS NOTES
Preop cardiac evaluation prior to left humerus surgery  I see no cardiac contraindications for surgery  Patient had nuclear stress test which was normal   She had echocardiogram which showed mild aortic stenosis and moderate tricuspid regurgitation  LV systolic function are normal   She has poor functional capacity  Based on above findings she should be at low-to-moderate risk for cardiac events during surgery  Call me if any questions

## 2018-12-07 ENCOUNTER — OFFICE VISIT (OUTPATIENT)
Dept: OBGYN CLINIC | Facility: CLINIC | Age: 83
End: 2018-12-07
Payer: MEDICARE

## 2018-12-07 VITALS
DIASTOLIC BLOOD PRESSURE: 83 MMHG | BODY MASS INDEX: 22.97 KG/M2 | HEIGHT: 60 IN | SYSTOLIC BLOOD PRESSURE: 124 MMHG | WEIGHT: 117 LBS | HEART RATE: 63 BPM

## 2018-12-07 DIAGNOSIS — S42.201A CLOSED FRACTURE OF PROXIMAL END OF RIGHT HUMERUS, UNSPECIFIED FRACTURE MORPHOLOGY, INITIAL ENCOUNTER: Primary | ICD-10-CM

## 2018-12-07 PROCEDURE — 99213 OFFICE O/P EST LOW 20 MIN: CPT | Performed by: ORTHOPAEDIC SURGERY

## 2018-12-07 PROCEDURE — 1124F ACP DISCUSS-NO DSCNMKR DOCD: CPT | Performed by: ORTHOPAEDIC SURGERY

## 2018-12-07 RX ORDER — CEFAZOLIN SODIUM 2 G/50ML
2000 SOLUTION INTRAVENOUS ONCE
Status: CANCELLED | OUTPATIENT
Start: 2018-12-07 | End: 2018-12-07

## 2018-12-07 RX ORDER — CHLORHEXIDINE GLUCONATE 4 G/100ML
SOLUTION TOPICAL DAILY PRN
Status: CANCELLED | OUTPATIENT
Start: 2018-12-07

## 2018-12-07 NOTE — PROGRESS NOTES
Patient Name:  Dorie Saxena  MRN:  [id number]    Assessment     1  Closed fracture of proximal end of right humerus, unspecified fracture morphology, initial encounter  Case request operating room: LEFT REVERSE TOTAL SHOULDER ARTHROPLASTY    Case request operating room: LEFT REVERSE TOTAL SHOULDER ARTHROPLASTY       Plan     1  Proceed with reverse total shoulder arthroplasty  Risks and benefits of surgery reviewed, and patient wishes to proceed  2  Await final medical clearance  3  Plan for surgery Monday 12/10/18 if cleared  Return for recheck after surgery  Subjective     28-year-old female presents with left shoulder pain after a fall at home on 11/21/18  She was evaluated in the ER where x-ray which showed a displaced fracture of her proximal humerus on the left  She was initially evaluated by Dr Randall Garrison who recommended reverse total shoulder arthroplasty  He is unable to perform the surgery due to unforeseen circumstances  She presents today to schedule that surgery with me  General ROS:  Negative for fever, lethargy/malaise, or night sweats  Neurological ROS:  Negative for confusion or seizures  Objective     /83   Pulse 63   Ht 5' (1 524 m)   Wt 53 1 kg (117 lb)   BMI 22 85 kg/m²     Left shoulder: Tenderness to palpation proximal humerus  Range of motion and strength limited by pain  2+ radial pulse on the left  Skin is intact with extensive ecchymosis but no erythema  Sensation intact to light touch throughout the left upper extremity  No gross motor deficits distally in the left upper extremity  Data Review     I have personally reviewed pertinent films in PACS, and my interpretation follows  X-rays left shoulder 11/30/18:  Displaced proximal humerus fracture with extensive comminution

## 2018-12-09 ENCOUNTER — ANESTHESIA EVENT (OUTPATIENT)
Dept: PERIOP | Facility: HOSPITAL | Age: 83
DRG: 483 | End: 2018-12-09
Payer: MEDICARE

## 2018-12-10 ENCOUNTER — APPOINTMENT (INPATIENT)
Dept: RADIOLOGY | Facility: HOSPITAL | Age: 83
DRG: 483 | End: 2018-12-10
Payer: MEDICARE

## 2018-12-10 ENCOUNTER — ANESTHESIA (OUTPATIENT)
Dept: PERIOP | Facility: HOSPITAL | Age: 83
DRG: 483 | End: 2018-12-10
Payer: MEDICARE

## 2018-12-10 ENCOUNTER — HOSPITAL ENCOUNTER (INPATIENT)
Facility: HOSPITAL | Age: 83
LOS: 4 days | Discharge: NURSING, REHAB OR LONG-TERM CARE | DRG: 483 | End: 2018-12-14
Attending: ORTHOPAEDIC SURGERY | Admitting: ORTHOPAEDIC SURGERY
Payer: MEDICARE

## 2018-12-10 DIAGNOSIS — S42.201A CLOSED FRACTURE OF PROXIMAL END OF RIGHT HUMERUS, UNSPECIFIED FRACTURE MORPHOLOGY, INITIAL ENCOUNTER: Primary | ICD-10-CM

## 2018-12-10 DIAGNOSIS — S42.292A CLOSED 4-PART FRACTURE OF PROXIMAL HUMERUS, LEFT, INITIAL ENCOUNTER: ICD-10-CM

## 2018-12-10 PROCEDURE — C1713 ANCHOR/SCREW BN/BN,TIS/BN: HCPCS | Performed by: ORTHOPAEDIC SURGERY

## 2018-12-10 PROCEDURE — C1776 JOINT DEVICE (IMPLANTABLE): HCPCS | Performed by: ORTHOPAEDIC SURGERY

## 2018-12-10 PROCEDURE — 0RRK00Z REPLACEMENT OF LEFT SHOULDER JOINT WITH REVERSE BALL AND SOCKET SYNTHETIC SUBSTITUTE, OPEN APPROACH: ICD-10-PCS | Performed by: ORTHOPAEDIC SURGERY

## 2018-12-10 PROCEDURE — 23472 RECONSTRUCT SHOULDER JOINT: CPT | Performed by: ORTHOPAEDIC SURGERY

## 2018-12-10 PROCEDURE — 73020 X-RAY EXAM OF SHOULDER: CPT

## 2018-12-10 PROCEDURE — C9290 INJ, BUPIVACAINE LIPOSOME: HCPCS | Performed by: ANESTHESIOLOGY

## 2018-12-10 DEVICE — SCREW PERIPHERAL 5 X 42MM: Type: IMPLANTABLE DEVICE | Site: SHOULDER | Status: FUNCTIONAL

## 2018-12-10 DEVICE — SCREW PERIPHERAL 5 X 18MM: Type: IMPLANTABLE DEVICE | Site: SHOULDER | Status: FUNCTIONAL

## 2018-12-10 DEVICE — GLENOSPHERE STD 36MM: Type: IMPLANTABLE DEVICE | Site: SHOULDER | Status: FUNCTIONAL

## 2018-12-10 DEVICE — SMARTSET HIGH PERFORMANCE MV MEDIUM VISCOSITY BONE CEMENT 40G
Type: IMPLANTABLE DEVICE | Site: SHOULDER | Status: FUNCTIONAL
Brand: SMARTSET

## 2018-12-10 DEVICE — SCREW PERIPHERAL 5 X 34MM: Type: IMPLANTABLE DEVICE | Site: SHOULDER | Status: FUNCTIONAL

## 2018-12-10 DEVICE — IMPLANTABLE DEVICE
Type: IMPLANTABLE DEVICE | Site: SHOULDER | Status: FUNCTIONAL
Brand: CEMENT RESTRICTOR

## 2018-12-10 DEVICE — SCREW CENTRAL 6 X 35MM: Type: IMPLANTABLE DEVICE | Site: SHOULDER | Status: FUNCTIONAL

## 2018-12-10 DEVICE — BASEPLATE STD 25MM: Type: IMPLANTABLE DEVICE | Site: SHOULDER | Status: FUNCTIONAL

## 2018-12-10 DEVICE — IMPLANTABLE DEVICE
Type: IMPLANTABLE DEVICE | Site: SHOULDER | Status: FUNCTIONAL
Brand: AEQUALIS REVERSED FX / ADAPTER

## 2018-12-10 DEVICE — IMPLANTABLE DEVICE
Type: IMPLANTABLE DEVICE | Site: SHOULDER | Status: FUNCTIONAL
Brand: AEQUALIS REVERSED FRACTURE

## 2018-12-10 DEVICE — SCREW PERIPHERAL 5 X 30MM: Type: IMPLANTABLE DEVICE | Site: SHOULDER | Status: FUNCTIONAL

## 2018-12-10 RX ORDER — CEFAZOLIN SODIUM 2 G/50ML
2000 SOLUTION INTRAVENOUS ONCE
Status: DISCONTINUED | OUTPATIENT
Start: 2018-12-10 | End: 2018-12-10 | Stop reason: SDUPTHER

## 2018-12-10 RX ORDER — ONDANSETRON 2 MG/ML
INJECTION INTRAMUSCULAR; INTRAVENOUS AS NEEDED
Status: DISCONTINUED | OUTPATIENT
Start: 2018-12-10 | End: 2018-12-10 | Stop reason: SURG

## 2018-12-10 RX ORDER — MAGNESIUM HYDROXIDE 1200 MG/15ML
LIQUID ORAL AS NEEDED
Status: DISCONTINUED | OUTPATIENT
Start: 2018-12-10 | End: 2018-12-10 | Stop reason: HOSPADM

## 2018-12-10 RX ORDER — LEVOTHYROXINE SODIUM 0.03 MG/1
25 TABLET ORAL
Status: DISCONTINUED | OUTPATIENT
Start: 2018-12-11 | End: 2018-12-14 | Stop reason: HOSPADM

## 2018-12-10 RX ORDER — CEFAZOLIN SODIUM 1 G/50ML
1000 SOLUTION INTRAVENOUS EVERY 8 HOURS
Status: COMPLETED | OUTPATIENT
Start: 2018-12-10 | End: 2018-12-11

## 2018-12-10 RX ORDER — ROCURONIUM BROMIDE 10 MG/ML
INJECTION, SOLUTION INTRAVENOUS AS NEEDED
Status: DISCONTINUED | OUTPATIENT
Start: 2018-12-10 | End: 2018-12-10 | Stop reason: SURG

## 2018-12-10 RX ORDER — GLYCOPYRROLATE 0.2 MG/ML
INJECTION INTRAMUSCULAR; INTRAVENOUS AS NEEDED
Status: DISCONTINUED | OUTPATIENT
Start: 2018-12-10 | End: 2018-12-10 | Stop reason: SURG

## 2018-12-10 RX ORDER — CHLORHEXIDINE GLUCONATE 4 G/100ML
SOLUTION TOPICAL DAILY PRN
Status: DISCONTINUED | OUTPATIENT
Start: 2018-12-10 | End: 2018-12-10 | Stop reason: SDUPTHER

## 2018-12-10 RX ORDER — HYDROCODONE BITARTRATE AND ACETAMINOPHEN 5; 325 MG/1; MG/1
2 TABLET ORAL EVERY 4 HOURS PRN
Status: DISCONTINUED | OUTPATIENT
Start: 2018-12-10 | End: 2018-12-11

## 2018-12-10 RX ORDER — DOCUSATE SODIUM 100 MG/1
100 CAPSULE, LIQUID FILLED ORAL 2 TIMES DAILY
Status: DISCONTINUED | OUTPATIENT
Start: 2018-12-10 | End: 2018-12-14 | Stop reason: HOSPADM

## 2018-12-10 RX ORDER — ONDANSETRON 2 MG/ML
4 INJECTION INTRAMUSCULAR; INTRAVENOUS ONCE AS NEEDED
Status: DISCONTINUED | OUTPATIENT
Start: 2018-12-10 | End: 2018-12-10 | Stop reason: HOSPADM

## 2018-12-10 RX ORDER — BUPIVACAINE HYDROCHLORIDE 5 MG/ML
INJECTION, SOLUTION PERINEURAL AS NEEDED
Status: DISCONTINUED | OUTPATIENT
Start: 2018-12-10 | End: 2018-12-10

## 2018-12-10 RX ORDER — CALCIUM CARBONATE 200(500)MG
1000 TABLET,CHEWABLE ORAL DAILY PRN
Status: DISCONTINUED | OUTPATIENT
Start: 2018-12-10 | End: 2018-12-14 | Stop reason: HOSPADM

## 2018-12-10 RX ORDER — SODIUM CHLORIDE 9 MG/ML
20 INJECTION, SOLUTION INTRAVENOUS CONTINUOUS
Status: DISCONTINUED | OUTPATIENT
Start: 2018-12-10 | End: 2018-12-11

## 2018-12-10 RX ORDER — CEFAZOLIN SODIUM 1 G/50ML
1000 SOLUTION INTRAVENOUS ONCE
Status: COMPLETED | OUTPATIENT
Start: 2018-12-10 | End: 2018-12-10

## 2018-12-10 RX ORDER — NEOSTIGMINE METHYLSULFATE 1 MG/ML
INJECTION INTRAVENOUS AS NEEDED
Status: DISCONTINUED | OUTPATIENT
Start: 2018-12-10 | End: 2018-12-10 | Stop reason: SURG

## 2018-12-10 RX ORDER — ONDANSETRON 2 MG/ML
4 INJECTION INTRAMUSCULAR; INTRAVENOUS EVERY 6 HOURS PRN
Status: DISCONTINUED | OUTPATIENT
Start: 2018-12-10 | End: 2018-12-14 | Stop reason: HOSPADM

## 2018-12-10 RX ORDER — POTASSIUM CHLORIDE 750 MG/1
10 TABLET, EXTENDED RELEASE ORAL ONCE
Status: COMPLETED | OUTPATIENT
Start: 2018-12-10 | End: 2018-12-10

## 2018-12-10 RX ORDER — SODIUM CHLORIDE 9 MG/ML
100 INJECTION, SOLUTION INTRAVENOUS CONTINUOUS
Status: DISCONTINUED | OUTPATIENT
Start: 2018-12-10 | End: 2018-12-12

## 2018-12-10 RX ORDER — EPHEDRINE SULFATE 50 MG/ML
INJECTION, SOLUTION INTRAVENOUS AS NEEDED
Status: DISCONTINUED | OUTPATIENT
Start: 2018-12-10 | End: 2018-12-10 | Stop reason: SURG

## 2018-12-10 RX ORDER — ASPIRIN 81 MG/1
81 TABLET, CHEWABLE ORAL DAILY
Status: DISCONTINUED | OUTPATIENT
Start: 2018-12-11 | End: 2018-12-11

## 2018-12-10 RX ORDER — PROPRANOLOL HYDROCHLORIDE 80 MG/1
160 CAPSULE, EXTENDED RELEASE ORAL DAILY
Status: DISCONTINUED | OUTPATIENT
Start: 2018-12-11 | End: 2018-12-11

## 2018-12-10 RX ORDER — LIDOCAINE HYDROCHLORIDE 10 MG/ML
INJECTION, SOLUTION INFILTRATION; PERINEURAL AS NEEDED
Status: DISCONTINUED | OUTPATIENT
Start: 2018-12-10 | End: 2018-12-10 | Stop reason: SURG

## 2018-12-10 RX ORDER — HYDROCODONE BITARTRATE AND ACETAMINOPHEN 5; 325 MG/1; MG/1
1 TABLET ORAL EVERY 4 HOURS PRN
Status: DISCONTINUED | OUTPATIENT
Start: 2018-12-10 | End: 2018-12-11

## 2018-12-10 RX ORDER — ALBUMIN, HUMAN INJ 5% 5 %
12.5 SOLUTION INTRAVENOUS ONCE
Status: COMPLETED | OUTPATIENT
Start: 2018-12-10 | End: 2018-12-10

## 2018-12-10 RX ORDER — BUPIVACAINE HYDROCHLORIDE 5 MG/ML
INJECTION, SOLUTION PERINEURAL AS NEEDED
Status: DISCONTINUED | OUTPATIENT
Start: 2018-12-10 | End: 2018-12-10 | Stop reason: SURG

## 2018-12-10 RX ORDER — CHLORHEXIDINE GLUCONATE 4 G/100ML
SOLUTION TOPICAL DAILY PRN
Status: DISCONTINUED | OUTPATIENT
Start: 2018-12-10 | End: 2018-12-10

## 2018-12-10 RX ORDER — SODIUM CHLORIDE 9 MG/ML
INJECTION, SOLUTION INTRAVENOUS CONTINUOUS PRN
Status: DISCONTINUED | OUTPATIENT
Start: 2018-12-10 | End: 2018-12-10 | Stop reason: SURG

## 2018-12-10 RX ORDER — ALBUMIN, HUMAN INJ 5% 5 %
SOLUTION INTRAVENOUS CONTINUOUS PRN
Status: DISCONTINUED | OUTPATIENT
Start: 2018-12-10 | End: 2018-12-10 | Stop reason: SURG

## 2018-12-10 RX ORDER — FENTANYL CITRATE 50 UG/ML
INJECTION, SOLUTION INTRAMUSCULAR; INTRAVENOUS AS NEEDED
Status: DISCONTINUED | OUTPATIENT
Start: 2018-12-10 | End: 2018-12-10 | Stop reason: SURG

## 2018-12-10 RX ORDER — FENTANYL CITRATE/PF 50 MCG/ML
25 SYRINGE (ML) INJECTION
Status: DISCONTINUED | OUTPATIENT
Start: 2018-12-10 | End: 2018-12-10 | Stop reason: HOSPADM

## 2018-12-10 RX ORDER — PROPOFOL 10 MG/ML
INJECTION, EMULSION INTRAVENOUS AS NEEDED
Status: DISCONTINUED | OUTPATIENT
Start: 2018-12-10 | End: 2018-12-10 | Stop reason: SURG

## 2018-12-10 RX ORDER — ACETAMINOPHEN 325 MG/1
650 TABLET ORAL EVERY 4 HOURS PRN
Status: DISCONTINUED | OUTPATIENT
Start: 2018-12-10 | End: 2018-12-14 | Stop reason: HOSPADM

## 2018-12-10 RX ADMIN — DOCUSATE SODIUM 100 MG: 100 CAPSULE, LIQUID FILLED ORAL at 20:31

## 2018-12-10 RX ADMIN — FENTANYL CITRATE 25 MCG: 50 INJECTION INTRAMUSCULAR; INTRAVENOUS at 13:55

## 2018-12-10 RX ADMIN — CEFAZOLIN SODIUM 1000 MG: 1 SOLUTION INTRAVENOUS at 13:52

## 2018-12-10 RX ADMIN — ALBUMIN HUMAN 12.5 G: 0.05 INJECTION, SOLUTION INTRAVENOUS at 18:18

## 2018-12-10 RX ADMIN — EPHEDRINE SULFATE 10 MG: 50 INJECTION, SOLUTION INTRAMUSCULAR; INTRAVENOUS; SUBCUTANEOUS at 14:05

## 2018-12-10 RX ADMIN — BUPIVACAINE HYDROCHLORIDE 10 ML: 5 INJECTION, SOLUTION PERINEURAL at 13:30

## 2018-12-10 RX ADMIN — DEXAMETHASONE SODIUM PHOSPHATE 4 MG: 10 INJECTION INTRAMUSCULAR; INTRAVENOUS at 13:55

## 2018-12-10 RX ADMIN — SODIUM CHLORIDE: 0.9 INJECTION, SOLUTION INTRAVENOUS at 12:49

## 2018-12-10 RX ADMIN — ALBUMIN HUMAN 12.5 G: 0.05 INJECTION, SOLUTION INTRAVENOUS at 17:20

## 2018-12-10 RX ADMIN — LIDOCAINE HYDROCHLORIDE ANHYDROUS 50 MG: 10 INJECTION, SOLUTION INFILTRATION at 13:55

## 2018-12-10 RX ADMIN — ALBUMIN (HUMAN): 12.5 SOLUTION INTRAVENOUS at 15:20

## 2018-12-10 RX ADMIN — POTASSIUM CHLORIDE 10 MEQ: 750 TABLET, EXTENDED RELEASE ORAL at 20:31

## 2018-12-10 RX ADMIN — EPHEDRINE SULFATE 5 MG: 50 INJECTION, SOLUTION INTRAMUSCULAR; INTRAVENOUS; SUBCUTANEOUS at 14:00

## 2018-12-10 RX ADMIN — SODIUM CHLORIDE 50 ML/HR: 0.9 INJECTION, SOLUTION INTRAVENOUS at 20:27

## 2018-12-10 RX ADMIN — ROCURONIUM BROMIDE 10 MG: 10 INJECTION INTRAVENOUS at 15:24

## 2018-12-10 RX ADMIN — NEOSTIGMINE METHYLSULFATE 3 MG: 1 INJECTION INTRAVENOUS at 16:20

## 2018-12-10 RX ADMIN — CEFAZOLIN SODIUM 1000 MG: 1 SOLUTION INTRAVENOUS at 23:09

## 2018-12-10 RX ADMIN — GLYCOPYRROLATE 0.2 MG: 0.2 INJECTION, SOLUTION INTRAMUSCULAR; INTRAVENOUS at 14:34

## 2018-12-10 RX ADMIN — PROPOFOL 130 MG: 10 INJECTION, EMULSION INTRAVENOUS at 13:55

## 2018-12-10 RX ADMIN — FENTANYL CITRATE 25 MCG: 50 INJECTION INTRAMUSCULAR; INTRAVENOUS at 13:15

## 2018-12-10 RX ADMIN — GLYCOPYRROLATE 0.4 MG: 0.2 INJECTION, SOLUTION INTRAMUSCULAR; INTRAVENOUS at 16:20

## 2018-12-10 RX ADMIN — ONDANSETRON 4 MG: 2 INJECTION INTRAMUSCULAR; INTRAVENOUS at 15:56

## 2018-12-10 RX ADMIN — BUPIVACAINE 10 ML: 13.3 INJECTION, SUSPENSION, LIPOSOMAL INFILTRATION at 13:30

## 2018-12-10 RX ADMIN — PHENYLEPHRINE HYDROCHLORIDE 20 MCG/MIN: 10 INJECTION INTRAVENOUS at 14:10

## 2018-12-10 RX ADMIN — ROCURONIUM BROMIDE 30 MG: 10 INJECTION INTRAVENOUS at 13:55

## 2018-12-10 NOTE — ANESTHESIA PROCEDURE NOTES
Peripheral Block    Patient location during procedure: holding area  Start time: 12/10/2018 1:10 PM  Reason for block: at surgeon's request and post-op pain management  Staffing  Anesthesiologist: Alla Osorio  Performed: anesthesiologist   Preanesthetic Checklist  Completed: patient identified, site marked, surgical consent, pre-op evaluation, timeout performed, IV checked, risks and benefits discussed and monitors and equipment checked  Peripheral Block  Patient position: Semi-sitting    Prep: ChloraPrep  Patient monitoring: heart rate, continuous pulse ox and frequent blood pressure checks  Block type: interscalene  Laterality: right  Injection technique: single-shot  Procedures: ultrasound guided  Ultrasound permanent image saved  Local infiltration: bupivacaine (With Exparel 1 35%,10 mls )  Infiltration strength: 0 5 %  Dose: 10 mL  Needle  Needle type: Stimuplex   Needle gauge: 22 G  Needle length: 10 cm  Needle localization: ultrasound guidance  Needle insertion depth: 1 cm  Assessment  Injection assessment: incremental injection, local visualized surrounding nerve on ultrasound, negative aspiration for heme and no paresthesia on injection  Heart rate change: no  Slow fractionated injection: yes  Post-procedure:  site cleaned  patient tolerated the procedure well with no immediate complications

## 2018-12-10 NOTE — OP NOTE
OPERATIVE REPORT  PATIENT NAME: Mino Camacho    :  1926  MRN: [id number]  Pt Location: [facility] OR ROOM 04    SURGERY DATE: 12/10/2018    Surgeon(s) and Role:     * Melissa Blair MD - Primary     * Jessica Coleman PA-C - Assisting     * Luc Goyal - Assisting    Pre-Op Diagnosis Codes:     * Closed four-part fracture of proximal end of left humerus    Post-Op Diagnosis Codes:     * Closed four-part fracture of proximal end of left humerus    Procedure(s) (LRB):  LEFT REVERSE TOTAL SHOULDER ARTHROPLASTY    Specimen(s):  * No specimens in log *    Estimated Blood Loss:   200 mL    Drains:  None     Anesthesia Type:   General w/ Interscalene Block    Complications:   None    Procedure and Technique:  The patient was identified in the preoperative holding area, and the surgical site was marked by the surgeon  Regional anesthesia was administered by the anesthesiologist in the holding area  The patient was transported to the operating room and placed in the supine position on the OR table  General anesthesia was administered, and the patient was intubated  The right upper extremity was prepped and draped in the usual sterile fashion  Prophylactic antibiotics were given within 1 hour of incision  Following a surgical timeout, a 10 cm incision was made anteriorly for a deltopectoral approach  Deep dissection was carried out bluntly to the deltopectoral interval  The cephalic vein was identified and retracted laterally  The subacromial and subdeltoid spaces were bluntly dissected  The clavipectoral fascia was incised along its lateral border exposing the fracture site  The inferior capsule was released while protecting the axillary nerve  The glenoid labrum was excised, and the glenohumeral ligaments were released circumferentially from the glenoid rim  The guide pin for the  Roulette PerFORM 25 mm glenoid base plate was inserted  The central peg hole was prepared, and the guidepin was removed   The glenoid baseplate was malleted in place, and a 6 5 mm screw was inserted  Compression screws were placed anteriorly and posteriorly, and locking screws were inserted inferiorly and superiorly in the baseplate  The 36 mm standard glenosphere was inserted and impacted with a mallet, and the central screw was tightened  The glenosphere was confirmed to be secure manually  Attention was turned to the humeral component  The humerus was reamed manually to a size 9  The trial humeral fracture stem was inserted, and a trial liner was inserted  The shoulder was reduced and demonstrated satisfactory soft tissue tension and stability with a +6 mm liner  The trial components were removed, and the shoulder was irrigated with sterile saline solution using the pulse   The AEQUALIS Reversed Fracture humeral stem size 7 mm diameter and 130 mm length was cemented in place and maintained in 20° retroversion until the cement hardened  The 6 mm lateralized humeral polyethylene insert was malleted in place  The shoulder was reduced, and final assessment of soft tissue tension and stability remained satisfactory  The shoulder was irrigated extensively with sterile saline solution using the pulse   The greater and lesser tuberosities were repaired to the prosthesis using three #2 Orthocord sutures  The rotator interval was closeod with 0 Vicryl suture in an interrupted figure-of-eight fashion, and the deltopectoral interval was reapposed with 0 Vicryl suture  The subcuticular layer was closed with simple buried 2-0 Vicryl suture, and the skin was closed with staples  The wound was dressed with a sterile Mepilex dressing  Anesthesia was reversed, and the patient was extubated  The assistance of an advance practitioner was necessary for sterile draping, retraction of soft tissues, protection of critical neurovascular structures, and positioning of the operative extremity during the procedure   A qualified resident physician was not available  I was present for the entire procedure      Patient Disposition:  PACU  and extubated and stable    SIGNATURE: Gautam Harrison MD  DATE: December 10, 2018  TIME: 4:20 PM

## 2018-12-10 NOTE — ANESTHESIA POSTPROCEDURE EVALUATION
Post-Op Assessment Note      CV Status:  Stable    Mental Status:  Alert and awake    Hydration Status:  Euvolemic    PONV Controlled:  Controlled    Airway Patency:  Patent    Post Op Vitals Reviewed: Yes          Staff: CRNA           BP   120/68   Temp   97 8   Pulse  62   Resp   16   SpO2   99

## 2018-12-10 NOTE — H&P
Patient Name:  Ayad Vasquez  MRN:  [id number]    [address] [place] [address]    Left proximal humerus fracture for reverse total shoulder arthroplasty      Chief Complaint    Left shoulder pain      History of the Present Illness    Ayad Vasquez is a 80 y o  female with left shoulder pain after a fall at home on 11/21/18  She presented to the ER where x-rays showed a comminuted displaced proximal humerus fracture on the left, and she presents today for reverse total shoulder arthroplasty to address the fracture  Review of Systems    General:  Negative for fever, lethargy/malaise, or night sweats  Eyes:  Negative for blurry vision or double vision  ENT:  Negative for hearing change, nasal discharge, or sore throat  Hematological:  Negative for bleeding problems or blood clots  Endocrine:  Negative for excessive thirst or temperature intolerance  Respiratory:  Negative for cough or wheezing  Cardiovascular:  Negative for chest pain, dyspnea on exertion, or palpitations  Gastrointestinal:  Negative for abdominal pain, diarrhea, or nausea/vomiting  Musculoskeletal:  As stated in the HPI and otherwise negative  Neurological:  Negative for confusion, headaches, or seizures  Psychological:  Negative for hallucinations or mood swings  Dermatological:  Negative for itching or rash  Physical Exam    /98   Pulse 56   Temp 98 2 °F (36 8 °C) (Temporal)   Resp 18   SpO2 100%     Left shoulder: Tenderness to palpation proximal humerus  Range of motion and strength are significantly limited by pain  Constitutional:  Well-developed and well-nourished  Eyes:  Anicteric sclerae  Neck:  Supple  Lungs:  Clear to auscultation bilaterally  Heart:  Regular rate and rhythm with audible S1 and S2   Skin:  Intact without erythema over the left shoulder and arm  Neurologic:  Sensation intact to light touch in the left upper extremity  Psychiatric:  Mood and affect are appropriate        Past Medical History: Diagnosis Date    Disease of thyroid gland     Hypertension        Past Surgical History:   Procedure Laterality Date    EYE SURGERY         No Known Allergies    No current facility-administered medications on file prior to encounter        Current Outpatient Prescriptions on File Prior to Encounter   Medication Sig Dispense Refill    aspirin 81 MG tablet Take 81 mg by mouth daily      Cholecalciferol (VITAMIN D3) 1000 units CAPS Daily      levothyroxine 25 mcg tablet 25 mcg        meloxicam (MOBIC) 7 5 mg tablet 1 BY MOUTH TWICE A DAY WITH FOOD AS NEEDED  5    Potassium 75 MG TABS potassium      propranolol (INDERAL LA) 160 mg 160 mg           Social History   Substance Use Topics    Smoking status: Never Smoker    Smokeless tobacco: Never Used    Alcohol use No       Family History   Problem Relation Age of Onset    No Known Problems Mother     No Known Problems Father

## 2018-12-10 NOTE — PERIOPERATIVE NURSING NOTE
Pt meeting PACU discharge criteria  Report called to A  Nerissa Lock RN  Pt transferred to room 404

## 2018-12-11 LAB
ABO GROUP BLD: NORMAL
ANION GAP SERPL CALCULATED.3IONS-SCNC: 7 MMOL/L (ref 4–13)
BLD GP AB SCN SERPL QL: NEGATIVE
BUN SERPL-MCNC: 23 MG/DL (ref 5–25)
CALCIUM SERPL-MCNC: 9.2 MG/DL (ref 8.3–10.1)
CHLORIDE SERPL-SCNC: 109 MMOL/L (ref 100–108)
CO2 SERPL-SCNC: 24 MMOL/L (ref 21–32)
CREAT SERPL-MCNC: 1.26 MG/DL (ref 0.6–1.3)
ERYTHROCYTE [DISTWIDTH] IN BLOOD BY AUTOMATED COUNT: 14.3 % (ref 11.6–15.1)
GFR SERPL CREATININE-BSD FRML MDRD: 37 ML/MIN/1.73SQ M
GLUCOSE SERPL-MCNC: 161 MG/DL (ref 65–140)
HCT VFR BLD AUTO: 23.5 % (ref 34.8–46.1)
HGB BLD-MCNC: 7.5 G/DL (ref 11.5–15.4)
MCH RBC QN AUTO: 32.5 PG (ref 26.8–34.3)
MCHC RBC AUTO-ENTMCNC: 31.9 G/DL (ref 31.4–37.4)
MCV RBC AUTO: 102 FL (ref 82–98)
PLATELET # BLD AUTO: 87 THOUSANDS/UL (ref 149–390)
PMV BLD AUTO: 10.7 FL (ref 8.9–12.7)
POTASSIUM SERPL-SCNC: 4.4 MMOL/L (ref 3.5–5.3)
RBC # BLD AUTO: 2.31 MILLION/UL (ref 3.81–5.12)
RH BLD: POSITIVE
SODIUM SERPL-SCNC: 140 MMOL/L (ref 136–145)
SPECIMEN EXPIRATION DATE: NORMAL
WBC # BLD AUTO: 5.54 THOUSAND/UL (ref 4.31–10.16)

## 2018-12-11 PROCEDURE — 80048 BASIC METABOLIC PNL TOTAL CA: CPT | Performed by: PHYSICIAN ASSISTANT

## 2018-12-11 PROCEDURE — 86900 BLOOD TYPING SEROLOGIC ABO: CPT | Performed by: PHYSICIAN ASSISTANT

## 2018-12-11 PROCEDURE — P9021 RED BLOOD CELLS UNIT: HCPCS

## 2018-12-11 PROCEDURE — 86920 COMPATIBILITY TEST SPIN: CPT

## 2018-12-11 PROCEDURE — 86850 RBC ANTIBODY SCREEN: CPT | Performed by: PHYSICIAN ASSISTANT

## 2018-12-11 PROCEDURE — 85027 COMPLETE CBC AUTOMATED: CPT | Performed by: PHYSICIAN ASSISTANT

## 2018-12-11 PROCEDURE — 86901 BLOOD TYPING SEROLOGIC RH(D): CPT | Performed by: PHYSICIAN ASSISTANT

## 2018-12-11 PROCEDURE — 99253 IP/OBS CNSLTJ NEW/EST LOW 45: CPT | Performed by: INTERNAL MEDICINE

## 2018-12-11 PROCEDURE — 99024 POSTOP FOLLOW-UP VISIT: CPT | Performed by: ORTHOPAEDIC SURGERY

## 2018-12-11 PROCEDURE — 30233N1 TRANSFUSION OF NONAUTOLOGOUS RED BLOOD CELLS INTO PERIPHERAL VEIN, PERCUTANEOUS APPROACH: ICD-10-PCS | Performed by: ORTHOPAEDIC SURGERY

## 2018-12-11 RX ORDER — OXYCODONE HYDROCHLORIDE 5 MG/1
5 TABLET ORAL EVERY 4 HOURS PRN
Status: DISCONTINUED | OUTPATIENT
Start: 2018-12-11 | End: 2018-12-14 | Stop reason: HOSPADM

## 2018-12-11 RX ORDER — OXYCODONE HYDROCHLORIDE 5 MG/1
2.5 TABLET ORAL EVERY 4 HOURS PRN
Status: DISCONTINUED | OUTPATIENT
Start: 2018-12-11 | End: 2018-12-14 | Stop reason: HOSPADM

## 2018-12-11 RX ADMIN — DOCUSATE SODIUM 100 MG: 100 CAPSULE, LIQUID FILLED ORAL at 10:59

## 2018-12-11 RX ADMIN — SODIUM CHLORIDE 1000 ML: 0.9 INJECTION, SOLUTION INTRAVENOUS at 14:10

## 2018-12-11 RX ADMIN — ASPIRIN 81 MG: 81 TABLET, CHEWABLE ORAL at 10:59

## 2018-12-11 RX ADMIN — ENOXAPARIN SODIUM 30 MG: 30 INJECTION SUBCUTANEOUS at 11:06

## 2018-12-11 RX ADMIN — SODIUM CHLORIDE 100 ML/HR: 0.9 INJECTION, SOLUTION INTRAVENOUS at 15:59

## 2018-12-11 RX ADMIN — CEFAZOLIN SODIUM 1000 MG: 1 SOLUTION INTRAVENOUS at 06:42

## 2018-12-11 RX ADMIN — DOCUSATE SODIUM 100 MG: 100 CAPSULE, LIQUID FILLED ORAL at 17:09

## 2018-12-11 RX ADMIN — LEVOTHYROXINE SODIUM 25 MCG: 25 TABLET ORAL at 06:42

## 2018-12-11 RX ADMIN — SODIUM CHLORIDE 1000 ML: 0.9 INJECTION, SOLUTION INTRAVENOUS at 11:09

## 2018-12-11 NOTE — CONSULTS
Inpatient Medical Consultation - [facility]    Patient Information: Nash Chapman 80 y o  female MRN: [id number]  Unit/Bed#: -01 Encounter: [id number]  PCP: Harry Bhat DO  Date of Admission:  12/10/2018  Date of Consultation: 12/11/18  Requesting Physician: Keegan Mckeon MD    Reason For Consultation:   Medical Management    Assessment/Plan:    · Hypotension with Baseline history of Hypertension -   · Hold propranolol (home dose is 160 mg daily)  · S/p IV fluid bolus 1000 ml x 2  Also now on normal saline at 100 ml/hr  · Surgery team giving patient blood (PRBC x 1)  · Will check H/H following transfusion  BMP in am     · Monitor I/O, particularly on PO intake of liquids  · No diarrhea, vomiting, or similar extraneous losses  · Monitor blood pressures closely  · Anemia, likely acute blood loss anemia - receiving PRBC  Baseline hemoglobin is around 11  Hemoglobin monitoring  · Hypothyroidism - Synthroid  TSH in am     Recommendations for Discharge:  · To be determined  Counseling / Coordination of Care Time: 30 minutes  Greater than 50% of total time spent on patient counseling and coordination of care  Collaboration of Care: Were Recommendations Directly Discussed with Primary Treatment Team? - No     History of Present Illness:    Nash Chapman is a 80 y o  female who is originally admitted to the orthopedic service on 12/10/2018 due to need for Left Reverse Total Shoulder Arthroplasty for a closed fracture of the proximal end of the right humerus  This procedure was done on 12/10/2018 by Dr Del Villa  We are consulted post-operatively for medical co management  The patient is noted to have a low blood pressure today and two 1000 ml boluses needed to be given to improve SBP into the 90's    The patient is currently receiving a unit of packed red blood cells at the direction of the surgical team   The patient has a history of hypertension typically as well as hypothyroidism  These have been stable on propranolol and levothyroxine typically  She states that typically her blood pressures are in normal range with the propanolol  She has not had problems with low blood pressures before  The patient denies any dizziness or lightheadedness currently  She denies any chest pain, palpitations, or new back pain  The patient has had no nausea or vomiting and no diarrhea  No abdominal pain  The patient feels that she is eating okay  Review of Systems:    Review of Systems   Constitutional: Positive for activity change (Decreased)  Negative for appetite change, chills, diaphoresis and fever  HENT: Negative for congestion, rhinorrhea, sinus pain, sinus pressure, sneezing and sore throat  Eyes: Negative for visual disturbance  Respiratory: Negative for cough, chest tightness, shortness of breath and stridor  Cardiovascular: Negative for chest pain, palpitations and leg swelling  Gastrointestinal: Negative for abdominal distention, abdominal pain, blood in stool, constipation, diarrhea, nausea and vomiting  Endocrine: Negative  Genitourinary: Negative for decreased urine volume, difficulty urinating, dysuria and urgency  Musculoskeletal: Positive for arthralgias  Skin: Negative for rash  Allergic/Immunologic: Negative  Neurological: Negative for dizziness, syncope, speech difficulty, weakness, light-headedness and numbness  Hematological: Negative  Psychiatric/Behavioral: Negative  All other systems reviewed and are negative        Past Medical and Surgical History:     Past Medical History:   Diagnosis Date    Disease of thyroid gland     Hypertension        Past Surgical History:   Procedure Laterality Date    EYE SURGERY      OR RECONSTR TOTAL SHOULDER IMPLANT Left 12/10/2018    Procedure: LEFT REVERSE TOTAL SHOULDER ARTHROPLASTY;  Surgeon: Devin Guerra MD;  Location: [facility];  Service: Orthopedics       Meds/Allergies:    all medications and allergies reviewed    Allergies: No Known Allergies    Social History:     Marital Status:     Substance Use History:   History   Alcohol Use No     History   Smoking Status    Never Smoker   Smokeless Tobacco    Never Used     History   Drug Use No       Family History:    Family History   Problem Relation Age of Onset    No Known Problems Mother     No Known Problems Father        Physical Exam:     Vitals:   Blood Pressure: 92/64 (12/11/18 1635)  Pulse: 79 (12/11/18 1635)  Temperature: 98 5 °F (36 9 °C) (12/11/18 1635)  Temp Source: Oral (12/11/18 1635)  Respirations: 18 (12/11/18 1635)  Weight - Scale: 52 8 kg (116 lb 6 5 oz) (12/10/18 1917)  SpO2: 96 % (12/11/18 1635)    Physical Exam   HENT:   Mouth/Throat: Oropharynx is clear and moist  No oropharyngeal exudate  Eyes: Pupils are equal, round, and reactive to light  Conjunctivae are normal    Neck: Neck supple  Cardiovascular: Normal rate and regular rhythm  No murmur heard  Pulmonary/Chest: Effort normal  She has no wheezes  She has no rales  Abdominal: Soft  Bowel sounds are normal  She exhibits no distension  There is no tenderness  Musculoskeletal: She exhibits no edema  Left arm is in a sling  Neurovascular intact to fingers distally  Neurological: She is alert  No cranial nerve deficit  Motor strength is symmetric bilaterally and 5/5   Psychiatric: She has a normal mood and affect  Vitals reviewed  Additional Data:     Lab Results: I have personally reviewed pertinent reports  Results from last 7 days  Lab Units 12/11/18  0453   WBC Thousand/uL 5 54   HEMOGLOBIN g/dL 7 5*   HEMATOCRIT % 23 5*   PLATELETS Thousands/uL 87*       Results from last 7 days  Lab Units 12/11/18  0453   POTASSIUM mmol/L 4 4   CHLORIDE mmol/L 109*   CO2 mmol/L 24   BUN mg/dL 23   CREATININE mg/dL 1 26   CALCIUM mg/dL 9 2           Imaging: I have personally reviewed pertinent reports        Xr Shoulder 1 Vw Left    Result Date: 12/11/2018  Narrative: LEFT SHOULDER INDICATION: Postop  COMPARISON: Plain radiographs November 30, 2018 VIEWS: AP portable view IMAGES:  1 FINDINGS: Images demonstrate left shoulder arthroplasty which appears in satisfactory position  Anticipated post surgical changes are seen in the adjacent soft tissues  Impression: Postoperative changes as above  Workstation performed: [id number]     Xr Shoulder 2+ Vw Left    Result Date: 12/3/2018  Narrative: LEFT SHOULDER INDICATION:   S42 202A: Unspecified fracture of upper end of left humerus, initial encounter for closed fracture  COMPARISON:  November 21, 2018 VIEWS:  XR SHOULDER 2+ VW LEFT FINDINGS: There is a displaced comminuted fracture of the left humeral head and neck, little changed in appearance since the earlier exam   There is no dislocation  No significant degenerative changes  No lytic or blastic lesions are seen  Soft tissues are unremarkable  Impression: Unchanged appearance of a comminuted fracture of the left humeral head and neck  Workstation performed: [id number]       ** Please Note: This note has been constructed using a voice recognition system   **

## 2018-12-11 NOTE — SOCIAL WORK
LOS 1  Patient is not a bundle or a readmission  CM spoke with patient and son at the bedside  Patient lives in [place] in a one level home alone  There are 0 FABIO  Patient has a ramp to enter the home  Patient uses a straight cane at home  Patient has a walk-in shower with a bench and hand railings  Patient has a wheelchair available  Patient needs light assistance with ADLs  Patient's son lives next door and assists with meals/cleaning  Patient has a history of VNA with [facility]  Patient denies history of inpatient rehab  Patient uses [facility] pharmacy in [place]  Patient has prescription insurance and can afford her medications  Patient does not have an advance directive/living will  Information provided at the bedside  Patient's son Dudley Arredondo is her emergency contact  His number is   Patient is retired and does not drive  Patient denies history of alcohol/drug abuse and mental illness history  Patient may need transport arranged at time of discharge  CM discussed with patient and son at the bedside re: DCP  Patient and son aware that PT/OT are ordered  PT/OT will place their recommendation  CM provided a copy of the SNF list to son at the bedside  Copy of advanced directive given to patient's son  CM contact information provided to family  Patient's son is agreeable to CM setting up transport  Patient's son returns to work on Thursday  All questions/concerns answered at the bedside  CM reviewed discharge planning process including the following: identifying caregivers at home, preference for d/c planning needs, [facility] Meds to Bed program, availability of treatment team to discuss questions or concerns patient and/or family may have regarding diagnosis, plan of care, old or new medications and discharge planning   CM will continue to follow for care coordination and update assessment as necessary

## 2018-12-11 NOTE — OCCUPATIONAL THERAPY NOTE
Occupational Therapy Cancel Note        Patient Name: Emperatriz Sarmiento  [id number] Date: 12/11/2018    OT orders received and chart review completed  Spoke w/ RN, Catherine Cox  Pt presents w/ >2 0 drop in hemoglobin from pre - op levels  Currently 7 5  Per RN, plan to transfuse one unit of blood   Will cancel OT eval for this AM, and continue to follow later today as schedule allows and as appropriate to complete eval    Nico Johnson, OT

## 2018-12-11 NOTE — PHYSICAL THERAPY NOTE
Physical Therapy Cancellation Note      PT orders noted and chart reviewed  Pt presents with a significant drop in hemoglobin compared to pre-op levels  Will hold PT evaluation until medically appropriate     Valiant Ayaz, PT,DPT

## 2018-12-11 NOTE — PLAN OF CARE
Problem: DISCHARGE PLANNING - CARE MANAGEMENT  Goal: Discharge to post-acute care or home with appropriate resources  INTERVENTIONS:  - Conduct assessment to determine patient/family and health care team treatment goals, and need for post-acute services based on payer coverage, community resources, and patient preferences, and barriers to discharge  - Address psychosocial, clinical, and financial barriers to discharge as identified in assessment in conjunction with the patient/family and health care team  - Arrange appropriate level of post-acute services according to patients   needs and preference and payer coverage in collaboration with the physician and health care team  - Communicate with and update the patient/family, physician, and health care team regarding progress on the discharge plan  - Arrange appropriate transportation to post-acute venues  Outcome: Progressing  LOS 1  Patient is not a bundle or a readmission  CM spoke with patient and son at the bedside  Patient lives in [place] in a one level home alone  There are 0 FABIO  Patient has a ramp to enter the home  Patient uses a straight cane at home  Patient has a walk-in shower with a bench and hand railings  Patient has a wheelchair available  Patient needs light assistance with ADLs  Patient's son lives next door and assists with meals/cleaning  Patient has a history of VNA with [facility]  Patient denies history of inpatient rehab  Patient uses [facility] pharmacy in [place]  Patient has prescription insurance and can afford her medications  Patient does not have an advance directive/living will  Information provided at the bedside  Patient's son Monica Guevara is her emergency contact  His number is   Patient is retired and does not drive  Patient denies history of alcohol/drug abuse and mental illness history  Patient may need transport arranged at time of discharge  CM discussed with patient and son at the bedside re: DCP   Patient and son aware that PT/OT are ordered  PT/OT will place their recommendation  CM provided a copy of the SNF list to son at the bedside  Copy of advanced directive given to patient's son  CM contact information provided to family  Patient's son is agreeable to CM setting up transport  Patient's son returns to work on Thursday  All questions/concerns answered at the bedside  CM reviewed discharge planning process including the following: identifying caregivers at home, preference for d/c planning needs, [facility] Med[facility] to Bed program, availability of treatment team to discuss questions or concerns patient and/or family may have regarding diagnosis, plan of care, old or new medications and discharge planning   CM will continue to follow for care coordination and update assessment as necessary

## 2018-12-11 NOTE — PROGRESS NOTES
Orthopedics   Barby Brown 80 y o  female MRN: [id number]  Unit/Bed#: -01      Subjective:  80 y  o female post operative day 1 left total shoulder arthroplasty  Patient is pleasantly confused  She denies any pain currently  Reports numbness on the dorsum of the hand that was present before surgery and not changed since after surgery  Denies any fevers or chills, no CP or SOB      Labs:    0  Lab Value Date/Time   HCT 23 5 (L) 12/11/2018 0453   HCT 33 9 (L) 11/30/2018 1050   HGB 7 5 (L) 12/11/2018 0453   HGB 11 0 (L) 11/30/2018 1050   INR 1 35 (H) 11/30/2018 1050   WBC 5 54 12/11/2018 0453   WBC 4 98 11/30/2018 1050   CRP 14 4 (H) 11/30/2018 1050       Meds:    Current Facility-Administered Medications:     acetaminophen (TYLENOL) tablet 650 mg, 650 mg, Oral, Q4H PRN, Kelli Gelineau, PA-C    aspirin chewable tablet 81 mg, 81 mg, Oral, Daily, Kelli Gelineau, PA-C    calcium carbonate (TUMS) chewable tablet 1,000 mg, 1,000 mg, Oral, Daily PRN, Kelli Gelineau, PA-C    docusate sodium (COLACE) capsule 100 mg, 100 mg, Oral, BID, Kelli Gelineau, PA-C, 100 mg at 12/10/18 2031    enoxaparin (LOVENOX) subcutaneous injection 40 mg, 40 mg, Subcutaneous, Daily, Kelli Gelineau, PA-C    HYDROcodone-acetaminophen (NORCO) 5-325 mg per tablet 1 tablet, 1 tablet, Oral, Q4H PRN, Kelli Gelineau, PA-C    HYDROcodone-acetaminophen (NORCO) 5-325 mg per tablet 2 tablet, 2 tablet, Oral, Q4H PRN, Kelli Gelineau, PA-C    levothyroxine tablet 25 mcg, 25 mcg, Oral, Early Morning, Kelli Gelineau, PA-C, 25 mcg at 12/11/18 8352    morphine injection 1 mg, 1 mg, Intravenous, Q6H PRN, Kelli Gelineau, PA-C    ondansetron [facility]) injection 4 mg, 4 mg, Intravenous, Q6H PRN, Kelli Gelineau, PA-C    propranolol (INDERAL LA) 24 hr capsule 160 mg, 160 mg, Oral, Daily, Kelli Gelineau, PA-C    sodium chloride 0 9 % infusion, 50 mL/hr, Intravenous, Continuous, Kelli Gelineau, PA-C, Last Rate: 50 mL/hr at 12/10/18 2027, 50 mL/hr at 12/10/18 2027    sodium chloride 0 9 % infusion, 20 mL/hr, Intravenous, Continuous, Hoang Willett CRNA    Blood Culture:   No results found for: BLOODCX    Wound Culture:   No results found for: WOUNDCULT    Ins and Outs:  I/O last 24 hours: In: 1705 [I V :800; IV Piggyback:750]  Out: 600 [Urine:375; Blood:225]          Physical:  Vitals:    12/11/18 0700   BP: 93/55   Pulse: 62   Resp: 16   Temp: 97 6 °F (36 4 °C)   SpO2: 97%     left upper extremity  · Dressings clean dry intact  · Dry mucous membranes and conjunctival pallor noted  · Sensation intact to to light touch in axillary, musculocutaneous, radial, ulna, median nerve distributions  · Motor grossly intact to axillary, musculocutaneous, radial, ulna, median nerves  · 2+ Radial pulse    _*_*_*_*_*_*_*_*_*_*_*_*_*_*_*_*_*_*_*_*_*_*_*_*_*_*_*_*_*_*_*_*_*_*_*_*_*_*_*_*_*    Assessment: 80 y  o female post operative day 1 left reverse total shoulder arthroplasty, ABLA    Plan:  · Nonweight Bearing left upper extremity  · Up and out of bed  · PT/OT  · Sling for Comfort, may remove for pendulums and hygiene  · DVT prophylaxis - lovenox, SCDs  · Ice and analgesics  · Patient noted to have acute blood loss anemia due to a drop in Hbg of > 2 0g from preop levels, pt asymptomatic however with decreased BP and clinical signs of anemia, will transfuse 1 unit      Shantel Torrez PA-C

## 2018-12-11 NOTE — UTILIZATION REVIEW
Initial Clinical Review    Age/Sex: 80 y o  female    Surgery Date: 12/10/2018    Procedure: LEFT REVERSE TOTAL SHOULDER ARTHROPLASTY     Anesthesia: General w/ Interscalene Block    Admission Orders: Date/Time/Statement: 12/10/18 @ 1620     Orders Placed This Encounter   Procedures    Inpatient Admission     Standing Status:   Standing     Number of Occurrences:   1     Order Specific Question:   Admitting Physician     Answer:   Cait Robins [[id number]]     Order Specific Question:   Level of Care     Answer:   Med Surg [[id number]]     Order Specific Question:   Estimated length of stay     Answer:   Inpatient Only Surgery       Vital Signs: BP 90/65 (BP Location: Right arm)   Pulse 62   Temp 97 6 °F (36 4 °C)   Resp 16   Wt 52 8 kg (116 lb 6 5 oz)   SpO2 97%   BMI 22 73 kg/m²     Diet:        Diet Orders            Start     Ordered    12/10/18 2033  Room Service  Once     Question:  Type of Service  Answer:  Room Service - Appropriate with Assistance    12/10/18 2033    12/10/18 1903  Diet Regular; Regular House  Diet effective now     Question Answer Comment   Diet Type Regular    Regular Regular House    RD to adjust diet per protocol? Yes        12/10/18 1902          Mobility:  Up with assistance  Non weight bearing LUE  PT/OT    DVT Prophylaxis: scds    Pain Control: no prn used   Pain Medications             aspirin 81 MG tablet Take 81 mg by mouth daily    meloxicam (MOBIC) 7 5 mg tablet 1 BY MOUTH TWICE A DAY WITH FOOD AS NEEDED        Po medication:  Asa  Propranolol  Levothyroxine    colace  IVF @ 100 cc/h  Consult medicine    Labs 12/11- hgb 7 5, hct 23 5    12/11 transfuse PRBC

## 2018-12-11 NOTE — PLAN OF CARE
DISCHARGE PLANNING     Discharge to home or other facility with appropriate resources Progressing        DISCHARGE PLANNING - CARE MANAGEMENT     Discharge to post-acute care or home with appropriate resources Progressing        INFECTION - ADULT     Absence or prevention of progression during hospitalization Progressing        Knowledge Deficit     Patient/family/caregiver demonstrates understanding of disease process, treatment plan, medications, and discharge instructions Progressing        METABOLIC, FLUID AND ELECTROLYTES - ADULT     Glucose maintained within target range Progressing        Nutrition/Hydration-ADULT     Nutrient/Hydration intake appropriate for improving, restoring or maintaining nutritional needs Progressing        PAIN - ADULT     Verbalizes/displays adequate comfort level or baseline comfort level Progressing        Potential for Falls     Patient will remain free of falls Progressing        Prexisting or High Potential for Compromised Skin Integrity     Skin integrity is maintained or improved Progressing

## 2018-12-12 PROBLEM — I95.9 HYPOTENSION: Status: RESOLVED | Noted: 2018-12-12 | Resolved: 2018-12-12

## 2018-12-12 PROBLEM — D50.0 BLOOD LOSS ANEMIA: Status: ACTIVE | Noted: 2018-12-12

## 2018-12-12 PROBLEM — D50.0 BLOOD LOSS ANEMIA: Status: RESOLVED | Noted: 2018-12-12 | Resolved: 2018-12-12

## 2018-12-12 PROBLEM — I95.9 HYPOTENSION: Status: ACTIVE | Noted: 2018-12-12

## 2018-12-12 PROBLEM — N18.2 CKD (CHRONIC KIDNEY DISEASE) STAGE 2, GFR 60-89 ML/MIN: Status: ACTIVE | Noted: 2018-12-12

## 2018-12-12 LAB
ABO GROUP BLD BPU: NORMAL
ABO GROUP BLD BPU: NORMAL
ALBUMIN SERPL BCP-MCNC: 2.5 G/DL (ref 3.5–5)
ALP SERPL-CCNC: 49 U/L (ref 46–116)
ALT SERPL W P-5'-P-CCNC: 11 U/L (ref 12–78)
ANION GAP SERPL CALCULATED.3IONS-SCNC: 7 MMOL/L (ref 4–13)
AST SERPL W P-5'-P-CCNC: 17 U/L (ref 5–45)
BASOPHILS # BLD AUTO: 0.02 THOUSANDS/ΜL (ref 0–0.1)
BASOPHILS NFR BLD AUTO: 0 % (ref 0–1)
BILIRUB SERPL-MCNC: 1.2 MG/DL (ref 0.2–1)
BPU ID: NORMAL
BPU ID: NORMAL
BUN SERPL-MCNC: 27 MG/DL (ref 5–25)
CALCIUM SERPL-MCNC: 9.5 MG/DL (ref 8.3–10.1)
CHLORIDE SERPL-SCNC: 112 MMOL/L (ref 100–108)
CO2 SERPL-SCNC: 21 MMOL/L (ref 21–32)
CREAT SERPL-MCNC: 1.34 MG/DL (ref 0.6–1.3)
CROSSMATCH: NORMAL
CROSSMATCH: NORMAL
EOSINOPHIL # BLD AUTO: 0.02 THOUSAND/ΜL (ref 0–0.61)
EOSINOPHIL NFR BLD AUTO: 0 % (ref 0–6)
ERYTHROCYTE [DISTWIDTH] IN BLOOD BY AUTOMATED COUNT: 17.5 % (ref 11.6–15.1)
GFR SERPL CREATININE-BSD FRML MDRD: 34 ML/MIN/1.73SQ M
GLUCOSE SERPL-MCNC: 112 MG/DL (ref 65–140)
HCT VFR BLD AUTO: 28.6 % (ref 34.8–46.1)
HCT VFR BLD AUTO: 29.8 % (ref 34.8–46.1)
HGB BLD-MCNC: 9.5 G/DL (ref 11.5–15.4)
HGB BLD-MCNC: 9.9 G/DL (ref 11.5–15.4)
IMM GRANULOCYTES # BLD AUTO: 0.02 THOUSAND/UL (ref 0–0.2)
IMM GRANULOCYTES NFR BLD AUTO: 0 % (ref 0–2)
LYMPHOCYTES # BLD AUTO: 1.45 THOUSANDS/ΜL (ref 0.6–4.47)
LYMPHOCYTES NFR BLD AUTO: 23 % (ref 14–44)
MCH RBC QN AUTO: 31.6 PG (ref 26.8–34.3)
MCHC RBC AUTO-ENTMCNC: 33.2 G/DL (ref 31.4–37.4)
MCV RBC AUTO: 95 FL (ref 82–98)
MONOCYTES # BLD AUTO: 0.65 THOUSAND/ΜL (ref 0.17–1.22)
MONOCYTES NFR BLD AUTO: 10 % (ref 4–12)
NEUTROPHILS # BLD AUTO: 4.07 THOUSANDS/ΜL (ref 1.85–7.62)
NEUTS SEG NFR BLD AUTO: 67 % (ref 43–75)
NRBC BLD AUTO-RTO: 0 /100 WBCS
PLATELET # BLD AUTO: 81 THOUSANDS/UL (ref 149–390)
PMV BLD AUTO: 10.4 FL (ref 8.9–12.7)
POTASSIUM SERPL-SCNC: 4.1 MMOL/L (ref 3.5–5.3)
PROT SERPL-MCNC: 4.9 G/DL (ref 6.4–8.2)
RBC # BLD AUTO: 3.01 MILLION/UL (ref 3.81–5.12)
SODIUM SERPL-SCNC: 140 MMOL/L (ref 136–145)
TSH SERPL DL<=0.05 MIU/L-ACNC: 2.57 UIU/ML (ref 0.36–3.74)
UNIT DISPENSE STATUS: NORMAL
UNIT DISPENSE STATUS: NORMAL
UNIT PRODUCT CODE: NORMAL
UNIT PRODUCT CODE: NORMAL
UNIT RH: NORMAL
UNIT RH: NORMAL
WBC # BLD AUTO: 6.23 THOUSAND/UL (ref 4.31–10.16)

## 2018-12-12 PROCEDURE — 85018 HEMOGLOBIN: CPT | Performed by: INTERNAL MEDICINE

## 2018-12-12 PROCEDURE — 85014 HEMATOCRIT: CPT | Performed by: INTERNAL MEDICINE

## 2018-12-12 PROCEDURE — 84443 ASSAY THYROID STIM HORMONE: CPT | Performed by: INTERNAL MEDICINE

## 2018-12-12 PROCEDURE — 80053 COMPREHEN METABOLIC PANEL: CPT | Performed by: INTERNAL MEDICINE

## 2018-12-12 PROCEDURE — 99232 SBSQ HOSP IP/OBS MODERATE 35: CPT | Performed by: PHYSICIAN ASSISTANT

## 2018-12-12 PROCEDURE — 97167 OT EVAL HIGH COMPLEX 60 MIN: CPT

## 2018-12-12 PROCEDURE — 97163 PT EVAL HIGH COMPLEX 45 MIN: CPT

## 2018-12-12 PROCEDURE — 85025 COMPLETE CBC W/AUTO DIFF WBC: CPT | Performed by: INTERNAL MEDICINE

## 2018-12-12 PROCEDURE — G8987 SELF CARE CURRENT STATUS: HCPCS

## 2018-12-12 PROCEDURE — G8979 MOBILITY GOAL STATUS: HCPCS

## 2018-12-12 PROCEDURE — G8988 SELF CARE GOAL STATUS: HCPCS

## 2018-12-12 PROCEDURE — 99024 POSTOP FOLLOW-UP VISIT: CPT | Performed by: ORTHOPAEDIC SURGERY

## 2018-12-12 PROCEDURE — 97110 THERAPEUTIC EXERCISES: CPT

## 2018-12-12 PROCEDURE — G8978 MOBILITY CURRENT STATUS: HCPCS

## 2018-12-12 RX ORDER — MAGNESIUM HYDROXIDE/ALUMINUM HYDROXICE/SIMETHICONE 120; 1200; 1200 MG/30ML; MG/30ML; MG/30ML
30 SUSPENSION ORAL EVERY 4 HOURS PRN
Status: DISCONTINUED | OUTPATIENT
Start: 2018-12-12 | End: 2018-12-14 | Stop reason: HOSPADM

## 2018-12-12 RX ADMIN — ACETAMINOPHEN 650 MG: 325 TABLET, FILM COATED ORAL at 00:48

## 2018-12-12 RX ADMIN — DOCUSATE SODIUM 100 MG: 100 CAPSULE, LIQUID FILLED ORAL at 07:50

## 2018-12-12 RX ADMIN — DOCUSATE SODIUM 100 MG: 100 CAPSULE, LIQUID FILLED ORAL at 18:13

## 2018-12-12 RX ADMIN — LEVOTHYROXINE SODIUM 25 MCG: 25 TABLET ORAL at 07:00

## 2018-12-12 RX ADMIN — OXYCODONE HYDROCHLORIDE 2.5 MG: 5 TABLET ORAL at 07:51

## 2018-12-12 NOTE — PLAN OF CARE
INFECTION - ADULT     Absence or prevention of progression during hospitalization Progressing        Nutrition/Hydration-ADULT     Nutrient/Hydration intake appropriate for improving, restoring or maintaining nutritional needs Progressing        PAIN - ADULT     Verbalizes/displays adequate comfort level or baseline comfort level Progressing        Potential for Falls     Patient will remain free of falls Progressing        Prexisting or High Potential for Compromised Skin Integrity     Skin integrity is maintained or improved Progressing

## 2018-12-12 NOTE — PROGRESS NOTES
Patients blood pressure 74/44  Patient asymptomatic resting in chair  Ortho aware, bolus ordered  Firm bruising also noted around patients incision site/underarm  Ortho aware and stated that bruising is expected

## 2018-12-12 NOTE — PLAN OF CARE
Problem: PHYSICAL THERAPY ADULT  Goal: Performs mobility at highest level of function for planned discharge setting  See evaluation for individualized goals  Treatment/Interventions: Functional transfer training, LE strengthening/ROM, Therapeutic exercise, Endurance training, Cognitive reorientation, Patient/family training, Equipment eval/education, Bed mobility, Gait training  Equipment Recommended: Other (Comment) (hemiwalker)       See flowsheet documentation for full assessment, interventions and recommendations  Prognosis: Fair  Problem List: Decreased strength, Decreased range of motion, Decreased endurance, Impaired balance, Decreased mobility, Decreased coordination, Decreased cognition, Decreased safety awareness, Impaired hearing, Orthopedic restrictions, Pain  Assessment: Pt presents with left shoulder pain after a fall at home on 11/21/18  12/10/18 left reverse total shoulder arthroplasty  order placed for PT eval and tx, w/ activity order of NWB L UE, sling for comfort, and up w/ assist  Order also placed for shoulder pendulums; elbow/wrist/digit ROM;  exercises  pt presents w/ comorbidities of HTN and eye surgery and personal factors of advanced age, mobilizing w/ assistive device, decreased cognition, limited home support, positive fall history, hearing impairments, inability to perform IADLs and inability to perform ADLs  pt presents w/ pain, weakness, decreased ROM, decreased endurance, impaired cognition, impaired balance, gait deviations, impaired hearing, impaired coordination, decreased safety awareness, orthopedic restrictions and fall risk   these impairments are evident in findings from physical examination (weakness, decreased ROM and impaired coordination), mobility assessment (need for mod to max assist w/ all phases of mobility when usually mobilizing independently, tolerance to only 6 feet of ambulation and need for cueing for mobility and breathing technique), and Barthel Index: 30/100  pt needed input for task focus and mobility technique/safety/NWB compliance  pt is at risk for falls due to physical and safety awareness deficits  pt's clinical presentation is unstable/unpredictable (evident in anemia, poor blood pressure control, need for assist w/ all phases of mobility when usually mobilizing independently, tolerance to only 6 feet of ambulation, pain impacting overall mobility status and need for input for mobility technique/safety/WBS compliance)  pt needs inpatient PT tx to improve mobility deficits  discharge recommendation is for inpatient rehab to reduce fall risk and maximize level of functional independence  Pt would benefit from OT consult to address safety awareness and ADL completion  Recommendation: Post acute IP rehab, OT consult          See flowsheet documentation for full assessment

## 2018-12-12 NOTE — PHYSICAL THERAPY NOTE
PHYSICAL THERAPY EVALUATION NOTE    Patient Name: Talya Del Toro  KJMLN'H Date: 12/12/2018  AGE:   80 y o  Mrn:   [id number]  ADMIT DX:  Closed fracture of proximal end of right humerus, unspecified fracture morphology, initial encounter [S42 201A]    Past Medical History:   Diagnosis Date    Disease of thyroid gland     Hypertension      Length Of Stay: 2  PHYSICAL THERAPY EVALUATION :    12/12/18 0821   Pain Assessment   Pain Assessment 0-10   Pain Score 4   Pain Location Shoulder   Pain Orientation Left   Home Living   Type of [address];Ramped entrance   Additional Comments lives alone  son lives next door - supportive and drives  ambulated w/ cane  2 falls in last 6 months  Prior Function   Comments pt seen supine in bed w/ 1:1 watch present  agreed to PT eval  c/o left shoulder pain  pt needed occasional input for task focus  Restrictions/Precautions   LUE Weight Bearing Per Order NWB   Braces or Orthoses Sling  (L UE)   Other Precautions Impulsive;1:1;Cognitive; Chair Alarm; Bed Alarm;WBS;Multiple lines; Fall Risk;Pain;Hard of hearing   General   Additional Pertinent History 12/11/18 at 4:53, hemoglobin was 7 5  12/11/18 at 13:38, blood pressure was 72/50  Family/Caregiver Present No   Cognition   Arousal/Participation Cooperative   Orientation Level Oriented to person;Oriented to time;Disoriented to place; Other (Comment)  (pt was identified w/ full name and birth date)   Following Commands Follows one step commands with increased time or repetition   Comments room air resting pulse ox 95% and 67 BPM  supine blood pressure was 118/62  RUE Assessment   RUE Assessment WFL  (4-/5, shoulder 3+/5)   LUE Assessment   LUE Assessment X  (shoulder not tested  elbow flex WFL/ext limited   others WFL )   RLE Assessment   RLE Assessment WFL  (4-/5)   LLE Assessment   LLE Assessment WFL  (4-/5)   Coordination Movements are Fluid and Coordinated 0   Coordination and Movement Description impaire coordination R UE and B LEs   Sensation X  (impaired hearing)   Light Touch   RLE Light Touch Grossly intact   LLE Light Touch Grossly intact   Bed Mobility   Supine to Sit 2  Maximal assistance   Additional items Assist x 1;HOB elevated; Bedrails; Increased time required; Impulsive;Verbal cues;LE management   Transfers   Sit to Stand 2  Maximal assistance   Additional items Assist x 1; Increased time required; Impulsive;Verbal cues  (for LE positioning, NWB compliance)   Stand to Sit 3  Moderate assistance   Additional items Assist x 1; Impulsive;Verbal cues  (for hand placemetn, controlled descent, NWB compliance)   Ambulation/Elevation   Gait pattern Narrow WILDA; Forward Flexion;Decreased foot clearance;Shuffling; Inconsistent richy; Short stride   Gait Assistance 2  Maximal assist  (w/ single point cane, modx1 w/ hemiwalker)   Additional items Assist x 1;Verbal cues; Tactile cues  (for device placement, posture, full step length, breathing t)   Assistive Device SPC; Franco-walker   Distance 3 feet  seated rest break x 4 minutes  6 feet w/ hemiwalker   (additional not possible due to pain and fatigue)   Balance   Static Sitting Fair +   Dynamic Sitting Poor   Static Standing Poor   Dynamic Standing Poor   Ambulatory Poor  (w/ hemiwalker)   Activity Tolerance   Activity Tolerance Patient limited by fatigue;Patient limited by pain   Nurse Made Aware spoke Dr Mindy Diaz, Shannon PCA, Bruno Gay CM   Assessment   Prognosis Fair   Problem List Decreased strength;Decreased range of motion;Decreased endurance; Impaired balance;Decreased mobility; Decreased coordination;Decreased cognition;Decreased safety awareness; Impaired hearing;Orthopedic restrictions;Pain   Assessment Pt presents with left shoulder pain after a fall at home on 11/21/18  12/10/18 left reverse total shoulder arthroplasty   order placed for PT eval and tx, w/ activity order of NWB L UE, sling for comfort, and up w/ assist  Order also placed for shoulder pendulums; elbow/wrist/digit ROM;  exercises  pt presents w/ comorbidities of HTN and eye surgery and personal factors of advanced age, mobilizing w/ assistive device, decreased cognition, limited home support, positive fall history, hearing impairments, inability to perform IADLs and inability to perform ADLs  pt presents w/ pain, weakness, decreased ROM, decreased endurance, impaired cognition, impaired balance, gait deviations, impaired hearing, impaired coordination, decreased safety awareness, orthopedic restrictions and fall risk  these impairments are evident in findings from physical examination (weakness, decreased ROM and impaired coordination), mobility assessment (need for mod to max assist w/ all phases of mobility when usually mobilizing independently, tolerance to only 6 feet of ambulation and need for cueing for mobility and breathing technique), and Barthel Index: 30/100  pt needed input for task focus and mobility technique/safety/NWB compliance  pt is at risk for falls due to physical and safety awareness deficits  pt's clinical presentation is unstable/unpredictable (evident in anemia, poor blood pressure control, need for assist w/ all phases of mobility when usually mobilizing independently, tolerance to only 6 feet of ambulation, pain impacting overall mobility status and need for input for mobility technique/safety/WBS compliance)  pt needs inpatient PT tx to improve mobility deficits  discharge recommendation is for inpatient rehab to reduce fall risk and maximize level of functional independence  Pt would benefit from OT consult to address safety awareness and ADL completion     Goals   Patient Goals go home   STG Expiration Date 12/22/18   Short Term Goal #1 pt will: Recall and adhere to NWB of L UE to expedite fx/surgical healing, Increase bilateral LE strength 1/2 grade to facilitate independent mobility, Perform all bed mobility tasks w/ supervision to decrease fall risk factors, Perform all transfers w/ supervision to improve independence, Ambulate 150 ft  with least restrictive assistive device w/ supervision w/o LOB to expedite safe return home, Increase ambulatory balance 1 grade to decrease risk for falls, Complete exercise program independently to increase overall activity tolerance, Tolerate 3 hr OOB to faciliate upright tolerance and Improve Barthel Index score to 55 or greater to facilitate independence   Plan   Treatment/Interventions Functional transfer training;LE strengthening/ROM; Therapeutic exercise; Endurance training;Cognitive reorientation;Patient/family training;Equipment eval/education; Bed mobility;Gait training   PT Frequency 5x/wk; Other (Comment)  (and 1x on weekend)   Recommendation   Recommendation Post acute IP rehab;OT consult   Equipment Recommended Other (Comment)  (hemiwalker)   Barthel Index   Feeding 5   Bathing 0   Grooming Score 0   Dressing Score 0   Bladder Score 5   Bowels Score 10   Toilet Use Score 5   Transfers (Bed/Chair) Score 5   Mobility (Level Surface) Score 0   Stairs Score 0   Barthel Index Score 30     Skilled PT recommended while in hospital and upon DC to progress pt toward treatment goals       Lynn Ibanez, PT

## 2018-12-12 NOTE — PROGRESS NOTES
Patient's BP 72/50  Patient asymptomatic resting in chair  SLIM and ortho notified  SLIM ordered another bolus to be given  Will continue to monitor

## 2018-12-12 NOTE — PROGRESS NOTES
Progress Note - Orthopedics   Umer Dempsey 80 y o  female MRN: [id number]  Unit/Bed#: -01 Encounter: [id number]      Assessment & Plan:  Left reverse total shoulder arthroplasty 12/10/18 for proximal humerus fracture  1  ABLA secondary to fracture and surgical blood loss  Hgb 7 5 yesterday, improved to 9 5 today after 2u PRBCs  Patient was symptomatic with hypotension yesterday, which has also improved with IV fluids and blood transfusion  Will monitor vital signs and recheck CBC tomorrow  2  Low platelets, will check CBC tomorrow  Discontinue enoxaparin  3  Internal medicine consult appreciated  4  PT/OT  5  Discharge planning, likely will need placement when medically stable  Subjective: Patient reports no complaints  Objective:    Vitals:    12/12/18 0730   BP: 126/82   Pulse: 79   Resp: 18   Temp: 98 8 °F (37 1 °C)   SpO2: 96%       Physical Exam:  Left shoulder: Dressing with mild bloody drainage  Soft tissue swelling and tenderness to palpation within an expected range after surgery  Range of motion limited by pain  2+ radial pulse  Patient is drowsy limiting neurological exam       I have personally reviewed pertinent lab results    Lab Results   Component Value Date    K 4 1 12/12/2018     (H) 12/12/2018    CO2 21 12/12/2018    BUN 27 (H) 12/12/2018    CREATININE 1 34 (H) 12/12/2018     Lab Results   Component Value Date    WBC 6 23 12/12/2018    HGB 9 5 (L) 12/12/2018    PLT 81 (L) 12/12/2018     No results found for: PT, INR, PTT

## 2018-12-12 NOTE — PLAN OF CARE
Problem: OCCUPATIONAL THERAPY ADULT  Goal: Performs self-care activities at highest level of function for planned discharge setting  See evaluation for individualized goals  Treatment Interventions: ADL retraining, Functional transfer training, UE strengthening/ROM, Cognitive reorientation, Patient/family training, Equipment evaluation/education, Compensatory technique education, Fine motor coordination activities, Continued evaluation, Activityengagement  Equipment Recommended: Other (comment) (will continue to assess at rehab)       See flowsheet documentation for full assessment, interventions and recommendations  Limitation: Decreased ADL status, Decreased UE ROM, Decreased UE strength, Decreased cognition, Decreased self-care trans, Decreased fine motor control, Decreased high-level ADLs     Assessment: Pt is a 81yo [place] [facility] [place]  Pt presents w/ closed fracture of right proximal humerus, and presents s/p L reverse total shoulder 12/10/2018  OT orders received w/ L UE NWB in sling; grasp, wrist, elbow ROM; shoulder pendulums  Pt lives alone in [facility] w/ ramp to enter and handicap accessible bathroom PTA  Pt reports I w/ ADL using cane PTA  Pt questionable historian upon eval, and unable to provide accurate social history  Pt confused and disoriented to place, time and situation upon eval  Pt completed feeding w/ S after set- up, and needs assistance to manage containers and cut food due to L UE deficits and precautions  Pt completed UBD w/ max A to don /doff, manage sling and LBD w/ min A  Pt completed sit <> stand w/ min A   Pt presents w/ increased pain, decreased L UE ROM / strenght, decreased memory, decreased attention, limited insight into deficits, decreased standing balance / tolerance, and decresaed dynamic sitting balance impacting her I w/ dressing, bathing, functional mobility, functional transfers, activity engagement, food prep / clean up, oral hygiene, and clothing mgmt  Pt would benefit from OT while in acute care to address deficits  From an OT perspective, pt would benefit from post acute rehab when medically stable for discharge from acute care to return to baseline level of I   Will continue to follow     OT Discharge Recommendation: Other (Comment) (to post acute rehab)  OT - OK to Discharge:  (to post acute rehab when stable)

## 2018-12-12 NOTE — PLAN OF CARE
Problem: PHYSICAL THERAPY ADULT  Goal: Performs mobility at highest level of function for planned discharge setting  See evaluation for individualized goals  Treatment/Interventions: Functional transfer training, LE strengthening/ROM, Therapeutic exercise, Endurance training, Cognitive reorientation, Patient/family training, Equipment eval/education, Bed mobility, Gait training  Equipment Recommended: Other (Comment) (hemiwalker)       See flowsheet documentation for full assessment, interventions and recommendations  Outcome: Progressing  Prognosis: Fair  Problem List: Decreased strength, Decreased range of motion, Decreased endurance, Impaired balance, Decreased mobility, Decreased coordination, Decreased cognition, Decreased safety awareness, Impaired hearing, Orthopedic restrictions, Pain  Assessment: therapist introduced L UE exercises per orders from orthopedics  pt had fair tolerance to all movemetns as noted  pt does not have adequate standing balance/standing tolerance to perform shoulder pendulums  therapist attempted to provide handout but unable to access in computer system  pt needs follow up to increase independence w/ performing exercise program  inpatient rehab would benefit pt following discharge from hospital to increase level of mobility  Recommendation: Post acute IP rehab, OT consult          See flowsheet documentation for full assessment

## 2018-12-12 NOTE — PHYSICAL THERAPY NOTE
PHYSICAL THERAPY TREATMENT NOTE    Patient Name: Talya Del Toro  DGQQS'C Date: 12/12/2018 12/12/18 0831   Pain Assessment   Pain Assessment 0-10   Pain Score 4   Pain Location Shoulder   Pain Orientation Left   Restrictions/Precautions   LUE Weight Bearing Per Order NWB   Braces or Orthoses Sling  (L UE)   Other Precautions Impulsive;1:1;Cognitive; Chair Alarm; Bed Alarm;WBS;Multiple lines; Fall Risk;Pain;Hard of hearing   General   Chart Reviewed Yes   Family/Caregiver Present No   Cognition   Arousal/Participation Cooperative   Attention Attends with cues to redirect   Orientation Level Oriented to person;Oriented to time;Disoriented to place; Other (Comment)  (pt was identified w/ full name and birth date)   Following Commands Follows one step commands with increased time or repetition   Subjective   Subjective pt agreed to participate in PT intervention  Activity Tolerance   Activity Tolerance Patient limited by fatigue;Patient limited by pain   Nurse Made Aware spoke Dr Allie cMkee , Shannon PCA, [facility]   Equipment Use   Comments finger/thumb flexion/extension, wrist flexion/extension, elbow flexion/extension (w/ elbow supported on pillow) 10 each  therapist performed gentle end range elbow extension stretching 20 seconds 5x  Assessment   Prognosis Fair   Problem List Decreased strength;Decreased range of motion;Decreased endurance; Impaired balance;Decreased mobility; Decreased coordination;Decreased cognition;Decreased safety awareness; Impaired hearing;Orthopedic restrictions;Pain   Assessment therapist introduced L UE exercises per orders from orthopedics  pt had fair tolerance to all movemetns as noted  pt does not have adequate standing balance/standing tolerance to perform shoulder pendulums  therapist attempted to provide handout but unable to access in computer system   pt needs follow up to increase independence w/ performing exercise program  inpatient rehab would benefit pt following discharge from hospital to increase level of mobility  Goals   Patient Goals go home   STG Expiration Date 12/22/18   Short Term Goal #1 pt will: Recall and adhere to NWB of L UE to expedite fx/surgical healing, Increase bilateral LE strength 1/2 grade to facilitate independent mobility, Perform all bed mobility tasks w/ supervision to decrease fall risk factors, Perform all transfers w/ supervision to improve independence, Ambulate 150 ft  with least restrictive assistive device w/ supervision w/o LOB to expedite safe return home, Increase ambulatory balance 1 grade to decrease risk for falls, Complete exercise program independently to increase overall activity tolerance, Tolerate 3 hr OOB to faciliate upright tolerance and Improve Barthel Index score to 55 or greater to facilitate independence   Treatment Day 1   Plan   Treatment/Interventions Functional transfer training;LE strengthening/ROM; Therapeutic exercise; Endurance training;Cognitive reorientation;Patient/family training;Equipment eval/education; Bed mobility;Gait training   Progress Progressing toward goals   PT Frequency 5x/wk; Other (Comment)  (and 1x on weekend)   Recommendation   Recommendation Post acute IP rehab;OT consult   Equipment Recommended Other (Comment)  (hemiwalker)     Skilled inpatient PT recommended while in hospital to progress pt toward treatment goals      Rajesh Davis, PT

## 2018-12-12 NOTE — OCCUPATIONAL THERAPY NOTE
[address]  Evaluation     Patient Name: Osmany Downs  [id number] Date: 12/12/2018  Problem List  Patient Active Problem List   Diagnosis    Closed 4-part fracture of proximal humerus, left, initial encounter    Pre-op evaluation    EKG, abnormal    Essential hypertension    Closed fracture of right proximal humerus     Past Medical History  Past Medical History:   Diagnosis Date    Disease of thyroid gland     Hypertension      Past Surgical History  Past Surgical History:   Procedure Laterality Date    EYE SURGERY      [place] RECONSTR TOTAL SHOULDER IMPLANT Left 12/10/2018    Procedure: LEFT REVERSE TOTAL SHOULDER ARTHROPLASTY;  Surgeon: Olena Fraire MD;  [facility];  Service: Orthopedics      12/12/18 0910   Note Type   Note type Eval/Treat   Restrictions/Precautions   Weight Bearing Precautions Per Order Yes   LUE Weight Bearing Per Order NWB   Braces or Orthoses Sling; Other (Comment)  (L UE)   Other Precautions Impulsive;Cognitive;1:1;Chair Alarm; Bed Alarm;WBS;Fall Risk;Pain;Hard of hearing   Pain Assessment   Pain Assessment FLACC   Pain Score 3   Pain Type Acute pain;Surgical pain   Pain Location Arm; Shoulder   Pain Orientation Left   Effect of Pain on Daily Activities limits I w/ ADL and standing tolerance   Patient's Stated Pain Goal No pain   Hospital Pain Intervention(s) Repositioned; Ambulation/increased activity; Emotional support   Response to Interventions tolerated   Pain Rating: FLACC (Rest) - Face 0   Pain Rating: FLACC (Rest) - Legs 0   Pain Rating: FLACC (Rest) - Activity 0   Pain Rating: FLACC (Rest) - Cry 0   Pain Rating: FLACC (Rest) - Consolability 0   Score: FLACC (Rest) 0   Pain Rating: FLACC (Activity) - Face 1   Pain Rating: FLACC (Activity) - Legs 1   Pain Rating: FLACC (Activity) - Activity 0   Pain Rating: FLACC (Activity) - Cry 0   Pain Rating: FLACC (Activity) - Consolability 1   Score: FLACC (Activity) 3   Home Living   Type of Home House   Home Layout One level;Ramped entrance; Able to live on main level with bedroom/bathroom   Bathroom Shower/Tub Walk-in shower   Bathroom Toilet Raised   Bathroom Equipment Grab bars in shower; Shower chair   [address] Cane;Grab bars   Additional Comments Pt lives alone in [facility] w/ ramp to enter  Pt reports handicap accessible bathroom that was built for her son   Prior Function   Level of [place] Needs assistance with IADLs   Lives With Alone   Receives Help From Family; Other (Comment)  (son and DIL live next door)   ADL Assistance Independent   IADLs Needs assistance  (- drive)   Falls in the last 6 months 1 to 4   Vocational Retired   Comments Pt reports using cane for functional mobiltiy and I w/ ADL  Pt reports that her son drives her to the store  Lifestyle   Autonomy Pt reports I w/ ADL PTA  Pt questionable historian upon eval; confused and disoriented to place, and situation   Reciprocal Relationships Pt reports supportive son and daughter in law next door  Service to Others Pt reports she did housework, and still does it   Intrinsic Gratification Pt unable to report interests, hobbies upon eval  Will continue to assess   ADL   Eating Assistance 5  Supervision/Setup   Eating Deficit Setup; Increased time to complete; Other (Comment)  (A to manage containers and cut food at times)   Grooming Assistance 4  Minimal Assistance   Grooming Deficit Setup;Supervision/safety;Verbal cueing; Increased time to complete   UB Bathing Assistance Unable to assess   LB Bathing Assistance Unable to assess   UB Dressing Assistance 2  Maximal Assistance  (max A to don/doff sling, manage fasteners)   UB Dressing Deficit Setup;Verbal cueing;Supervision/safety; Increased time to complete; Fasteners; Thread LUE   LB Dressing Assistance 4  Minimal Assistance   LB Dressing Deficit Setup;Supervision/safety;Verbal cueing; Increased time to complete   Bed Mobility   Supine to Sit Unable to assess   Sit to Supine Unable to assess   Additional Comments Pt seated OOB in chair upon arrival finishing up breakfast and post eval w/ call bell in reach, chair alarm activated, and 1:1 present   Transfers   Sit to Stand 4  Minimal assistance   Additional items Assist x 1; Armrests; Increased time required;Verbal cues   Stand to Sit 4  Minimal assistance   Additional items Assist x 1; Armrests; Increased time required;Verbal cues   Additional Comments pt completed sit<> stand twice during eval from bedside recliner chair w/ min A; benefits from cues for hand placement, tech    Balance   Static Sitting Good   Dynamic Sitting Fair   Static Standing Fair -   Activity Tolerance   Activity Tolerance Patient limited by fatigue;Patient limited by pain; Other (Comment)  (decresaed cognition, confused)   Medical Staff Made Aware spoke to PT, [address] [place] [address]   Nurse Made Aware spoke to RNTraci   RUE Assessment   RUE Assessment [facility]   RU Strength   RUE Overall Strength Within Functional Limits - able to perform ADL tasks with strength   LUE Assessment   LUE Assessment X   LUE Strength   LUE Overall Strength Due to  precautions;Due to pain;Due to cognitive deficits  (AROM grasp, wrist WFL)   Edema   LUE Edema Non-pitting   Hand Function   Gross Motor Coordination Impaired  (bilateral coordination due to L UE deficits)   Fine Motor Coordination Impaired  (R hand dominance)   Sensation   Light Touch No apparent deficits  (B UE)   Sharp/Dull Not tested   Vision-Basic Assessment   Current Vision Wears glasses all the time   Cognition   Overall Cognitive Status Impaired   Arousal/Participation Alert; Cooperative   Attention Difficulty attending to directions  (hard of hearing, confused)   Orientation Level Oriented to person;Disoriented to situation;Disoriented to time;Disoriented to place   Memory Decreased recall of recent events;Decreased recall of precautions;Decreased short term memory   Following Commands Follows one step commands with increased time or repetition   Comments Identified pt by full name and birthdate  Pt confused, but able to provide limited social history for home set- up  Limited recall recent events, and disoriented to place, situation, date  Able to report year  Pt able to follow one step directions during ADL performance w/ sign cues to sustain attention to task  Assessment   Limitation Decreased ADL status; Decreased UE ROM; Decreased UE strength;Decreased cognition;Decreased self-care trans;Decreased fine motor control;Decreased high-level ADLs   Assessment Pt is a 79yo female [facility]/10[facility]  Pt presents w/ closed fracture of right proximal humerus, and presents s/p L reverse total shoulder 12/10/2018  OT orders received w/ L UE NWB in sling; grasp, wrist, elbow ROM; shoulder pendulums  Pt lives alone in [facility] w/ ramp to enter and handicap accessible bathroom PTA  Pt reports I w/ ADL using cane PTA  Pt questionable historian upon eval, and unable to provide accurate social history  Pt confused and disoriented to place, time and situation upon eval  Pt completed feeding w/ S after set- up, and needs assistance to manage containers and cut food due to L UE deficits and precautions  Pt completed UBD w/ max A to don /doff, manage sling and LBD w/ min A  Pt completed sit <> stand w/ min A   Pt presents w/ increased pain, decreased L UE ROM / strenght, decreased memory, decreased attention, limited insight into deficits, decreased standing balance / tolerance, and decresaed dynamic sitting balance impacting her I w/ dressing, bathing, functional mobility, functional transfers, activity engagement, food prep / clean up, oral hygiene, and clothing mgmt  Pt would benefit from OT while in acute care to address deficits  From an OT perspective, pt would benefit from post acute rehab when medically stable for discharge from acute care to return to baseline level of I   Will continue to follow   Goals   Patient Goals Pt did not state upon eval     Plan   Treatment Interventions ADL retraining;Functional transfer training;UE strengthening/ROM; Cognitive reorientation;Patient/family training;Equipment evaluation/education; Compensatory technique education; Fine motor coordination activities;Continued evaluation; Activityengagement   Goal Expiration Date 12/19/18   OT Frequency 3-5x/wk   Recommendation   OT Discharge Recommendation Other (Comment)  (to post acute rehab)   Equipment Recommended Other (comment)  (will continue to assess at rehab)   OT - OK to Discharge (to post acute rehab when stable)   Barthel Index   Feeding 5   Bathing 0   Grooming Score 0   Dressing Score 5   Bladder Score 5   Bowels Score 10   Toilet Use Score 5   Transfers (Bed/Chair) Score 5   Mobility (Level Surface) Score 0   Stairs Score 0   Barthel Index Score 35   Modified [place] Scale   Modified [place] Scale 4   Pt goals to be met in 7 days:  1  Pt will demonstrate good attention and participation in continued evaluation to assess functional mobility / transfers to bed, chair, and toilet using least restrictive device to assist in safe discharge planning  2  Pt will demonstrate good attention and verbalize understanding of L UE activity / weight bearing precautions to max I and safety w/ ADL performance  3  Pt will consistently demonstrate understanding of safety / activity precautions during ADL performance w/ min cues  4  Pt will complete UBD w/ min A x1 after set- up and min cues  5  Pt will complete LBD w/ S after set- up while seated unsupported at EOB w/ fair + balance  6  Pt will demonstrate good attention and participation in continued evaluation to assess functional cognitive skills and DME Needs to assist in safe discharge planning  7   Pt will demonstrate increased functional standing tolerance for at least 10 minutes while engaged in grooming standing at sink using least restrictive device w/ S to max I w/ functional mobility to return to home alone environment w/ family support      Tori Chacko, OT

## 2018-12-12 NOTE — PROGRESS NOTES
Pt resting in bed  Pt has no complaints of pain and show no signs of distress  Pt was AAOx4 for RN  Call bell within reach  Will continue to monitor

## 2018-12-13 LAB
ANION GAP SERPL CALCULATED.3IONS-SCNC: 8 MMOL/L (ref 4–13)
BUN SERPL-MCNC: 19 MG/DL (ref 5–25)
CALCIUM SERPL-MCNC: 9.2 MG/DL (ref 8.3–10.1)
CHLORIDE SERPL-SCNC: 108 MMOL/L (ref 100–108)
CO2 SERPL-SCNC: 22 MMOL/L (ref 21–32)
CREAT SERPL-MCNC: 1.06 MG/DL (ref 0.6–1.3)
ERYTHROCYTE [DISTWIDTH] IN BLOOD BY AUTOMATED COUNT: 16.5 % (ref 11.6–15.1)
GFR SERPL CREATININE-BSD FRML MDRD: 46 ML/MIN/1.73SQ M
GLUCOSE SERPL-MCNC: 93 MG/DL (ref 65–140)
HCT VFR BLD AUTO: 30.5 % (ref 34.8–46.1)
HGB BLD-MCNC: 10.2 G/DL (ref 11.5–15.4)
MCH RBC QN AUTO: 31.3 PG (ref 26.8–34.3)
MCHC RBC AUTO-ENTMCNC: 33.4 G/DL (ref 31.4–37.4)
MCV RBC AUTO: 94 FL (ref 82–98)
PLATELET # BLD AUTO: 83 THOUSANDS/UL (ref 149–390)
PMV BLD AUTO: 10.1 FL (ref 8.9–12.7)
POTASSIUM SERPL-SCNC: 3.8 MMOL/L (ref 3.5–5.3)
RBC # BLD AUTO: 3.26 MILLION/UL (ref 3.81–5.12)
SODIUM SERPL-SCNC: 138 MMOL/L (ref 136–145)
WBC # BLD AUTO: 6.47 THOUSAND/UL (ref 4.31–10.16)

## 2018-12-13 PROCEDURE — 97110 THERAPEUTIC EXERCISES: CPT

## 2018-12-13 PROCEDURE — 97530 THERAPEUTIC ACTIVITIES: CPT

## 2018-12-13 PROCEDURE — 99024 POSTOP FOLLOW-UP VISIT: CPT | Performed by: PHYSICIAN ASSISTANT

## 2018-12-13 PROCEDURE — 80048 BASIC METABOLIC PNL TOTAL CA: CPT | Performed by: ORTHOPAEDIC SURGERY

## 2018-12-13 PROCEDURE — 85027 COMPLETE CBC AUTOMATED: CPT | Performed by: ORTHOPAEDIC SURGERY

## 2018-12-13 PROCEDURE — 99232 SBSQ HOSP IP/OBS MODERATE 35: CPT | Performed by: PHYSICIAN ASSISTANT

## 2018-12-13 PROCEDURE — 97116 GAIT TRAINING THERAPY: CPT

## 2018-12-13 RX ORDER — OXYCODONE HYDROCHLORIDE 5 MG/1
TABLET ORAL
Qty: 40 TABLET | Refills: 0 | Status: SHIPPED | OUTPATIENT
Start: 2018-12-13 | End: 2019-01-18

## 2018-12-13 RX ADMIN — LEVOTHYROXINE SODIUM 25 MCG: 25 TABLET ORAL at 05:32

## 2018-12-13 RX ADMIN — DOCUSATE SODIUM 100 MG: 100 CAPSULE, LIQUID FILLED ORAL at 08:48

## 2018-12-13 RX ADMIN — DOCUSATE SODIUM 100 MG: 100 CAPSULE, LIQUID FILLED ORAL at 17:57

## 2018-12-13 NOTE — PHYSICAL THERAPY NOTE
PHYSICAL THERAPY NOTE    Patient Name: Shawna WILSON Date: 2018 3042   Pain Assessment   Pain Assessment No/denies pain   Pain Score No Pain   Pain Type Acute pain   Pain Location Arm; Shoulder   Pain Orientation Left   Restrictions/Precautions   Weight Bearing Precautions Per Order Yes   LUE Weight Bearing Per Order NWB   Braces or Orthoses Sling   Other Precautions Cognitive; Chair Alarm; Bed Alarm;WBS;Multiple lines; Fall Risk;Pain;Hard of hearing; Impulsive   General   Family/Caregiver Present No   Subjective   Subjective Patient supine in bed and is agreeable to therapy session  Patient identifers obtained from name &   Bed Mobility   Supine to Sit 3  Moderate assistance   Additional items Assist x 1;HOB elevated; Bedrails; Increased time required;Verbal cues  (to maintain NWB L UE)   Sit to Supine Unable to assess   Additional Comments Patient seated OOB in recliner post session with chair alarm engaged, call bell and belongings in reach  Transfers   Sit to Stand 4  Minimal assistance   Additional items Assist x 1; Armrests; Increased time required;Verbal cues; Impulsive  (NWB compliance)   Stand to Sit 4  Minimal assistance   Additional items Assist x 1; Armrests; Increased time required;Verbal cues  (hand placement, NWB compliance)   Ambulation/Elevation   Gait pattern Narrow WILDA; Forward Flexion;Decreased foot clearance;Shuffling; Inconsistent richy; Short stride   Gait Assistance 3  Moderate assist   Additional items Assist x 1;Verbal cues   Assistive Device SPC; Franco-walker   Distance 25' feet hemiwalker, 95' SPC   Balance   Static Sitting Good   Dynamic Sitting Fair   Static Standing Fair -   Dynamic Standing Poor   Ambulatory Poor   Activity Tolerance   Activity Tolerance Patient tolerated treatment well   Nurse Made Aware Spoke to [address] [facility], RN   Exercises   Hip Abduction Sitting;10 reps;AROM; Bilateral   Knee AROM Long Arc Quad Sitting;10 reps;AROM; Bilateral   Ankle Pumps Sitting;15 reps;AROM; Bilateral   Marching Sitting;10 reps;AROM; Bilateral   Assessment   Prognosis Fair   Problem List Decreased strength;Decreased range of motion;Decreased endurance; Impaired balance;Decreased mobility; Decreased coordination;Decreased cognition;Decreased safety awareness; Impaired hearing;Orthopedic restrictions;Pain   Assessment Patient agreeable to participate after provided encouragement for mobility tasks  Patient demonstrated ability to transfer supine to sit with less person assist however requires extensive instruction for technique and body positioning in order to maintain NWB of L UE  Sit to stand transfers with consistent min a x1 and verbal instruction for hand placement and NWB complaince with fair carry over throughout session  Multiple ambulation trials performed with manuel walker and SPC  Patient presented with improved gait quality and stability with use of SPC with ability to ambulate increased gait distance with min to mod a x1  Ambulation with hemiwalker limited secondary to poor understanding of use despite visual and verbal demonstration  Lateral partial LOB to L side with use of hemiwalker requiring max a x 1 to regain upright balance  Patient impulsive throughout session requiring continuous instruction for safety  Patient participated in seated B LE exercise program with fair understanding and instruction for form and pace  Continue to focus on transfer and gait progression with reinforcement of NWB compliance and assitive device use as appropriate  Goals   Patient Goals none stated   STG Expiration Date 12/22/18   Treatment Day 2   Plan   Treatment/Interventions Functional transfer training;LE strengthening/ROM; Therapeutic exercise; Endurance training;Cognitive reorientation;Patient/family training;Equipment eval/education; Bed mobility;Gait training   Progress Progressing toward goals PT Frequency 5x/wk; Other (Comment)  (and 1x weekend)   Recommendation   Recommendation Post acute IP rehab   Equipment Recommended Other (Comment)  (hemiwalker vs SPC)       Nayeli Amador PTA

## 2018-12-13 NOTE — SOCIAL WORK
CM spoke with patient at the bedside  Patient is agreeable to STR at discharge  Patient would like CM to contact her son Augustine Moreira for placement  CM left a voice message with patient's son Augustine Moreira  Waiting for reply

## 2018-12-13 NOTE — PROGRESS NOTES
Orthopedics   Odettegracia Clayton 80 y o  female MRN: [id number]  Unit/Bed#: -01      Subjective:  Patient seen and evaluated  States left shoulder pain is well controlled  No numbness or tingling  No fevers or chills  Labs:    0  Lab Value Date/Time   HCT 30 5 (L) 12/13/2018 0509   HCT 28 6 (L) 12/12/2018 0531   HCT 29 8 (L) 12/12/2018 0152   HGB 10 2 (L) 12/13/2018 0509   HGB 9 5 (L) 12/12/2018 0531   HGB 9 9 (L) 12/12/2018 0152   INR 1 35 (H) 11/30/2018 1050   WBC 6 47 12/13/2018 0509   WBC 6 23 12/12/2018 0531   WBC 5 54 12/11/2018 0453   CRP 14 4 (H) 11/30/2018 1050       Meds:    Current Facility-Administered Medications:     acetaminophen (TYLENOL) tablet 650 mg, 650 mg, Oral, Q4H PRN, Yuliana Hernandez PA-C, 650 mg at 12/12/18 0048    aluminum-magnesium hydroxide-simethicone (MYLANTA) 200-200-20 mg/5 mL oral suspension 30 mL, 30 mL, Oral, Q4H PRN, Kylah Crooks PA-C    calcium carbonate (TUMS) chewable tablet 1,000 mg, 1,000 mg, Oral, Daily PRN, Yuliana Hernandez PA-C    docusate sodium (COLACE) capsule 100 mg, 100 mg, Oral, BID, Yuliana Hernandez PA-C, 100 mg at 12/12/18 1813    levothyroxine tablet 25 mcg, 25 mcg, Oral, Early Morning, Yuliana Hernandez PA-C, 25 mcg at 12/13/18 0532    morphine injection 1 mg, 1 mg, Intravenous, Q6H PRN, Yuliana Hernandez PA-C    [facility]) injection 4 mg, 4 mg, Intravenous, Q6H PRN, Yuliana Hernandez PA-C    oxyCODONE (ROXICODONE) IR tablet 2 5 mg, 2 5 mg, Oral, Q4H PRN, Gautam Harrison MD, 2 5 mg at 12/12/18 0751    oxyCODONE (ROXICODONE) IR tablet 5 mg, 5 mg, Oral, Q4H PRN, Gautam Harrison MD      Physical exam:  Vitals:    12/13/18 0721   BP: 143/81   Pulse: 75   Resp: 16   Temp: 99 3 °F (37 4 °C)   SpO2: 94%      Left shoulder:  · Dressing with scant bloody drainage noted  No saturation  · Ecchymosis noted upper extremity  · Elbow wrist and digital range of motion intact  · Sensation intact axillary, median, ulnar and radial nerves    · 2+ radial pulse     _*_*_*_*_*_*_*_*_*_*_*_*_*_*_*_*_*_*_*_*_*_*_*_*_*_*_*_*_*_*_*_*_*_*_*_*_*_*_*_*_*    Assessment: 80 y  o female status post left reverse total shoulder arthroplasty 12/10/18 for proximal humerus fracture    Plan:  · Pain Control  · NWB LUE in sling  · PT/OT  · Appreciate Internal Medicine Input  · ABLA, Improved  Hgb 10 2 this am compared to 9 5 yesterday  · D/C planning  Pending placement      Delilah Galvan PA-C

## 2018-12-13 NOTE — OCCUPATIONAL THERAPY NOTE
Occupational Therapy Treatment Note      Siva Jones    12/13/2018    Patient Active Problem List   Diagnosis    Closed 4-part fracture of proximal humerus, left, initial encounter    Pre-op evaluation    EKG, abnormal    Essential hypertension    Closed fracture of right proximal humerus    CKD (chronic kidney disease) stage 2, GFR 60-89 ml/min       Past Medical History:   Diagnosis Date    Disease of thyroid gland     Hypertension        Past Surgical History:   Procedure Laterality Date    EYE SURGERY      [place] RECONSTR TOTAL SHOULDER IMPLANT Left 12/10/2018    Procedure: LEFT REVERSE TOTAL SHOULDER ARTHROPLASTY;  Surgeon: Carlos Greene MD;  [facility];  Service: Orthopedics        12/13/18 1513   Restrictions/Precautions   Weight Bearing Precautions Per Order Yes   LUE Weight Bearing Per Order NWB  (in sling; grasp, wrist, elbow ROM; shoulder pendulums)   Braces or Orthoses Sling   Other Precautions Cognitive; Fall Risk;Bed Alarm;WBS;Hard of hearing   General   Response to Previous Treatment Patient with no complaints from previous session   Pain Assessment   Pain Assessment No/denies pain   Transfers   Additional Comments Pt sleeping upon OT arrival and declines OOB transfers at this time  Agreeable to only bed level ROM exercises with encouragement   Functional Mobility   Additional Comments n/a   Therapeutic Exercise - ROM   UE-ROM Yes   ROM - Left Upper Extremities    L Elbow AAROM   L Wrist AAROM   L Hand AAROM   L Position Side lying   L Weight/Reps/Sets no weight/10/3   LUE ROM Comment no reports of pain  OT also explained/demonstrated pendulum exercises, however pt continued to declined on this date  Cognition   Overall Cognitive Status Impaired   Arousal/Participation Alert; Cooperative   Attention Attends with cues to redirect   Orientation Level Oriented to person;Disoriented to place; Disoriented to time;Oriented to situation   Memory Decreased recall of recent events;Decreased recall of precautions;Decreased short term memory   Following Commands Follows one step commands with increased time or repetition   Comments Pt identified self by stating full name and    Activity Tolerance   Activity Tolerance Patient limited by fatigue   Medical Staff Made Aware RN cleared pt for OT tx session  At the end of session, pt left in bed, needs/call bell in reach  Assessment   Assessment Pt seen at bedside for Ot tx session focused on UE ROM exercises, as per orders for "grasp, wrist, elbow ROM; shoulder pendulums " Pt participated in all of the latter except for pendulums  AAROM for elbow flex/ext, AROM wrist/hand  Pt educated to continue to do hand pumps t/o day to decrease swelling and pt verbalized understanding  Pt with impaired cognition and therefore will need continued reinforcement  Pt tolerated UE ROM exercises without complaint on this date  Recommend continued OT services to address goals listed in evaluation- to increase strength, balance, activity tolerance to facilitate return to baseline level of independence  Recommend post acute rehab  Plan   Treatment Interventions ADL retraining;Functional transfer training;UE strengthening/ROM; Cognitive reorientation;Patient/family training;Equipment evaluation/education; Compensatory technique education; Fine motor coordination activities;Continued evaluation; Activityengagement   Goal Expiration Date 18   OT Frequency 3-5x/wk   Recommendation   OT Discharge Recommendation (post acute rehab)     Angelina Trevino OT

## 2018-12-13 NOTE — PROGRESS NOTES
Progress Note - Sara Torres 1926, 80 y o  female MRN: [id number]    Unit/Bed#: -01 Encounter: [id number]    Primary Care Provider: René Holliday DO   Date and time admitted to hospital: 12/10/2018 12:11 PM    * Closed fracture of right proximal humerus   Assessment & Plan    · Post op mgmt per primary team (ortho)  · Pain control/dvt prophylaxis  · Sling/NWB     Hypotension-resolved as of 2018   Assessment & Plan    · Resolved after PRBC and IVF  · Can resume po antihypertensives     Blood loss anemia-resolved as of 2018   Assessment & Plan    · Hgb 10 2 now after PRBC     CKD (chronic kidney disease) stage 2, GFR 60-89 ml/min   Assessment & Plan    · Stable in baseline       VTE Pharmacologic Prophylaxis:   Pharmacologic: Enoxaparin (Lovenox)  Mechanical VTE Prophylaxis in Place: Yes    Patient Centered Rounds: I have performed bedside rounds with nursing staff today  Discussions with Specialists or Other Care Team Provider:   Spoke with case management    Education and Discussions with Family / Patient:     Time Spent for Care: 20 minutes  More than 50% of total time spent on counseling and coordination of care as described above  Current Length of Stay: 3 day(s)    Current Patient Status: Inpatient   Certification Statement: Stable for discharge from our standpoint    Discharge Plan:   Patient is stable for discharge today from our standpoint  We will sign off but will be available as needed    Code Status: Level 1 - Full Code      Subjective:   Patient denies any heartburn today and states her shoulder is feeling okay  No new events or concerns overnight    Objective:     Vitals:   Temp (24hrs), Av 1 °F (37 3 °C), Min:98 8 °F (37 1 °C), Max:99 3 °F (37 4 °C)    Temp:  [98 8 °F (37 1 °C)-99 3 °F (37 4 °C)] 99 3 °F (37 4 °C)  HR:  [67-75] 75  Resp:  [16-18] 16  BP: (126-164)/(72-88) 143/81  SpO2:  [93 %-94 %] 94 %  Body mass index is 22 73 kg/m²       Input and Output Summary (last 24 hours): Intake/Output Summary (Last 24 hours) at 12/13/18 0951  Last data filed at 12/13/18 0532   Gross per 24 hour   Intake                0 ml   Output             1150 ml   Net            -1150 ml       Physical Exam:     Physical Exam   Constitutional: She appears well-developed  No distress  Clinically nontoxic sitting up in the chair   HENT:   Head: Normocephalic and atraumatic  Eyes: Conjunctivae are normal  Right eye exhibits no discharge  Left eye exhibits no discharge  No scleral icterus  Arcus senilis noted   Neck: Neck supple  Cardiovascular: Normal rate and regular rhythm  Murmur (Soft systolic ejection murmur) heard  Pulmonary/Chest: Effort normal  No respiratory distress  She has no wheezes  Abdominal: Soft  She exhibits no distension  Musculoskeletal:   Left upper extremity in sling  Some drainage noted on her dressing   Neurological: She is alert  Awake alert and interactive   Skin: Skin is warm and dry  No rash noted  She is not diaphoretic  No erythema  No pallor  Psychiatric: She has a normal mood and affect  Very pleasant and cooperative   Vitals reviewed  Additional Data:     Labs:      Results from last 7 days  Lab Units 12/13/18  0509 12/12/18  0531   WBC Thousand/uL 6 47 6 23   HEMOGLOBIN g/dL 10 2* 9 5*   HEMATOCRIT % 30 5* 28 6*   PLATELETS Thousands/uL 83* 81*   NEUTROS PCT %  --  67   LYMPHS PCT %  --  23   MONOS PCT %  --  10   EOS PCT %  --  0       Results from last 7 days  Lab Units 12/13/18  0509 12/12/18  0531   SODIUM mmol/L 138 140   POTASSIUM mmol/L 3 8 4 1   CHLORIDE mmol/L 108 112*   CO2 mmol/L 22 21   BUN mg/dL 19 27*   CREATININE mg/dL 1 06 1 34*   ANION GAP mmol/L 8 7   CALCIUM mg/dL 9 2 9 5   ALBUMIN g/dL  --  2 5*   TOTAL BILIRUBIN mg/dL  --  1 20*   ALK PHOS U/L  --  49   ALT U/L  --  11*   AST U/L  --  17   GLUCOSE RANDOM mg/dL 93 112                       * I Have Reviewed All Lab Data Listed Above    * Additional Pertinent Lab Tests Reviewed: All Labs Within Last 24 Hours Reviewed    Imaging:    Imaging Reports Reviewed Today Include:   Imaging Personally Reviewed by Myself Includes:      Recent Cultures (last 7 days):           Last 24 Hours Medication List:     Current Facility-Administered Medications:  acetaminophen 650 mg Oral Q4H PRN Mendoza Gloria PA-C   aluminum-magnesium hydroxide-simethicone 30 mL Oral Q4H PRN Kylah Crooks PA-C   calcium carbonate 1,000 mg Oral Daily PRN Mendoza SequeiraomGRETEL   docusate sodium 100 mg Oral BID Mendoza SequeiraomGRETEL   levothyroxine 25 mcg Oral Early Morning Mendoza Glroia PA-C   morphine injection 1 mg Intravenous Q6H PRN Mendoza Gloria PA-C   ondansetron 4 mg Intravenous Q6H PRN Mendoza SequeiraomGRETEL   oxyCODONE 2 5 mg Oral Q4H PRN Nicki Nash MD   oxyCODONE 5 mg Oral Q4H PRN Nicki Nash MD        Today, Patient Was Seen By: Sung Aldana PA-C    ** Please Note: Dictation voice to text software may have been used in the creation of this document   **

## 2018-12-13 NOTE — PLAN OF CARE
Problem: OCCUPATIONAL THERAPY ADULT  Goal: Performs self-care activities at highest level of function for planned discharge setting  See evaluation for individualized goals  Treatment Interventions: ADL retraining, Functional transfer training, UE strengthening/ROM, Cognitive reorientation, Patient/family training, Equipment evaluation/education, Compensatory technique education, Fine motor coordination activities, Continued evaluation, Activityengagement  Equipment Recommended: Other (comment) (will continue to assess at rehab)       See flowsheet documentation for full assessment, interventions and recommendations  Outcome: Progressing  Limitation: Decreased ADL status, Decreased UE ROM, Decreased UE strength, Decreased cognition, Decreased self-care trans, Decreased fine motor control, Decreased high-level ADLs     Assessment: Pt seen at bedside for Ot tx session focused on UE ROM exercises, as per orders for "grasp, wrist, elbow ROM; shoulder pendulums " Pt participated in all of the latter except for pendulums  AAROM for elbow flex/ext, AROM wrist/hand  Pt educated to continue to do hand pumps t/o day to decrease swelling and pt verbalized understanding  Pt with impaired cognition and therefore will need continued reinforcement  Pt tolerated UE ROM exercises without complaint on this date  Recommend continued OT services to address goals listed in evaluation- to increase strength, balance, activity tolerance to facilitate return to baseline level of independence  Recommend post acute rehab        OT Discharge Recommendation:  (post acute rehab)  OT - OK to Discharge:  (to post acute rehab when stable)

## 2018-12-13 NOTE — PLAN OF CARE
Problem: PHYSICAL THERAPY ADULT  Goal: Performs mobility at highest level of function for planned discharge setting  See evaluation for individualized goals  Treatment/Interventions: Functional transfer training, LE strengthening/ROM, Therapeutic exercise, Endurance training, Cognitive reorientation, Patient/family training, Equipment eval/education, Bed mobility, Gait training  Equipment Recommended: Other (Comment) (hemiwalker)       See flowsheet documentation for full assessment, interventions and recommendations  Outcome: Progressing  Prognosis: Fair  Problem List: Decreased strength, Decreased range of motion, Decreased endurance, Impaired balance, Decreased mobility, Decreased coordination, Decreased cognition, Decreased safety awareness, Impaired hearing, Orthopedic restrictions, Pain  Assessment: Patient agreeable to participate after provided encouragement for mobility tasks  Patient demonstrated ability to transfer supine to sit with less person assist however requires extensive instruction for technique and body positioning in order to maintain NWB of L UE  Sit to stand transfers with consistent min a x1 and verbal instruction for hand placement and NWB complaince with fair carry over throughout session  Multiple ambulation trials performed with manuel walker and SPC  Patient presented with improved gait quality and stability with use of SPC with ability to ambulate increased gait distance with min to mod a x1  Ambulation with hemiwalker limited secondary to poor understanding of use despite visual and verbal demonstration  Lateral partial LOB to L side with use of hemiwalker requiring max a x 1 to regain upright balance  Patient impulsive throughout session requiring continuous instruction for safety  Patient participated in seated B LE exercise program with fair understanding and instruction for form and pace   Continue to focus on transfer and gait progression with reinforcement of NWB compliance and assitive device use as appropriate  Recommendation: Post acute IP rehab          See flowsheet documentation for full assessment

## 2018-12-14 VITALS
DIASTOLIC BLOOD PRESSURE: 98 MMHG | HEART RATE: 79 BPM | BODY MASS INDEX: 22.73 KG/M2 | TEMPERATURE: 98.9 F | RESPIRATION RATE: 18 BRPM | SYSTOLIC BLOOD PRESSURE: 128 MMHG | WEIGHT: 116.4 LBS | OXYGEN SATURATION: 96 %

## 2018-12-14 PROBLEM — S42.202A CLOSED FRACTURE OF PROXIMAL END OF LEFT HUMERUS: Status: ACTIVE | Noted: 2018-12-07

## 2018-12-14 PROCEDURE — 99024 POSTOP FOLLOW-UP VISIT: CPT | Performed by: PHYSICIAN ASSISTANT

## 2018-12-14 RX ADMIN — LEVOTHYROXINE SODIUM 25 MCG: 25 TABLET ORAL at 05:52

## 2018-12-14 NOTE — PLAN OF CARE
Problem: Potential for Falls  Goal: Patient will remain free of falls  INTERVENTIONS:  - Assess patient frequently for physical needs  -  Identify cognitive and physical deficits and behaviors that affect risk of falls  -  [place] fall precautions as indicated by assessment   - Educate patient/family on patient safety including physical limitations  - Instruct patient to call for assistance with activity based on assessment  - Modify environment to reduce risk of injury  - Consider OT/PT consult to assist with strengthening/mobility   Outcome: Completed Date Met: 12/14/18      Problem: Prexisting or High Potential for Compromised Skin Integrity  Goal: Skin integrity is maintained or improved  INTERVENTIONS:  - Identify patients at risk for skin breakdown  - Assess and monitor skin integrity  - Assess and monitor nutrition and hydration status  - Monitor labs (i e  albumin)  - Assess for incontinence   - Turn and reposition patient  - Assist with mobility/ambulation  - Relieve pressure over bony prominences  - Avoid friction and shearing  - Provide appropriate hygiene as needed including keeping skin clean and dry  - Evaluate need for skin moisturizer/barrier cream  - Collaborate with interdisciplinary team (i e  Nutrition, Rehabilitation, etc )   - Patient/family teaching   Outcome: Completed Date Met: 12/14/18      Problem: Nutrition/Hydration-ADULT  Goal: Nutrient/Hydration intake appropriate for improving, restoring or maintaining nutritional needs  Monitor and assess patient's nutrition/hydration status for malnutrition (ex- brittle hair, bruises, dry skin, pale skin and conjunctiva, muscle wasting, smooth red tongue, and disorientation)  Collaborate with interdisciplinary team and initiate plan and interventions as ordered  Monitor patient's weight and dietary intake as ordered or per policy  Utilize nutrition screening tool and intervene per policy   Determine patient's food preferences and provide high-protein, high-caloric foods as appropriate       INTERVENTIONS:  - Monitor oral intake, urinary output, labs, and treatment plans  - Assess nutrition and hydration status and recommend course of action  - Evaluate amount of meals eaten  - Assist patient with eating if necessary   - Allow adequate time for meals  - Recommend/ encourage appropriate diets, oral nutritional supplements, and vitamin/mineral supplements  - Order, calculate, and assess calorie counts as needed  - Assess need for intravenous fluids  - Provide specific nutrition/hydration education as appropriate  - Include patient/family/caregiver in decisions related to nutrition    Outcome: Completed Date Met: 12/14/18      Problem: PAIN - ADULT  Goal: Verbalizes/displays adequate comfort level or baseline comfort level  Interventions:  - Encourage patient to monitor pain and request assistance  - Assess pain using appropriate pain scale  - Administer analgesics based on type and severity of pain and evaluate response  - Implement non-pharmacological measures as appropriate and evaluate response  - Consider cultural and social influences on pain and pain management  - Notify physician/advanced practitioner if interventions unsuccessful or patient reports new pain   Outcome: Completed Date Met: 12/14/18      Problem: INFECTION - ADULT  Goal: Absence or prevention of progression during hospitalization  INTERVENTIONS:  - Assess and monitor for signs and symptoms of infection  - Monitor lab/diagnostic results  - Monitor all insertion sites, i e  indwelling lines, tubes, and drains  - Monitor endotracheal (as able) and nasal secretions for changes in amount and color  - [place] appropriate cooling/warming therapies per order  - Administer medications as ordered  - Instruct and encourage patient and family to use good hand hygiene technique  - Identify and instruct in appropriate isolation precautions for identified infection/condition   Outcome: Completed Date Met: 12/14/18      Problem: DISCHARGE PLANNING  Goal: Discharge to home or other facility with appropriate resources  INTERVENTIONS:  - Identify barriers to discharge w/patient and caregiver  - Arrange for needed discharge resources and transportation as appropriate  - Identify discharge learning needs (meds, wound care, etc )  - Arrange for interpretive services to assist at discharge as needed  - Refer to Case Management Department for coordinating discharge planning if the patient needs post-hospital services based on physician/advanced practitioner order or complex needs related to functional status, cognitive ability, or social support system   Outcome: Completed Date Met: 12/14/18      Problem: Knowledge Deficit  Goal: Patient/family/caregiver demonstrates understanding of disease process, treatment plan, medications, and discharge instructions  Complete learning assessment and assess knowledge base    Interventions:  - Provide teaching at level of understanding  - Provide teaching via preferred learning methods   Outcome: Completed Date Met: 12/14/18      Problem: METABOLIC, FLUID AND ELECTROLYTES - ADULT  Goal: Glucose maintained within target range  INTERVENTIONS:  - Monitor Blood Glucose as ordered  - Assess for signs and symptoms of hyperglycemia and hypoglycemia  - Administer ordered medications to maintain glucose within target range  - Assess nutritional intake and initiate nutrition service referral as needed   Outcome: Completed Date Met: 12/14/18

## 2018-12-14 NOTE — PROGRESS NOTES
Orthopedics   Gus Pronto 80 y o  female MRN: [id number]  Unit/Bed#: -01      Subjective:  80 y  o female post operative day 4 left reverse total shoulder arthroplasty  Patient reports that her pain today is well controlled  She denies any numbness or tingling  Denies any chest pain, shortness of breath, fevers or chills  Labs:    0  Lab Value Date/Time   HCT 30 5 (L) 12/13/2018 0509   HCT 28 6 (L) 12/12/2018 0531   HCT 29 8 (L) 12/12/2018 0152   HGB 10 2 (L) 12/13/2018 0509   HGB 9 5 (L) 12/12/2018 0531   HGB 9 9 (L) 12/12/2018 0152   INR 1 35 (H) 11/30/2018 1050   WBC 6 47 12/13/2018 0509   WBC 6 23 12/12/2018 0531   WBC 5 54 12/11/2018 0453   CRP 14 4 (H) 11/30/2018 1050       Meds:    Current Facility-Administered Medications:     acetaminophen (TYLENOL) tablet 650 mg, 650 mg, Oral, Q4H PRN, Brittany Loaiza PA-C, 650 mg at 12/12/18 0048    aluminum-magnesium hydroxide-simethicone (MYLANTA) 200-200-20 mg/5 mL oral suspension 30 mL, 30 mL, Oral, Q4H PRN, Kylah Crooks PA-C    calcium carbonate (TUMS) chewable tablet 1,000 mg, 1,000 mg, Oral, Daily PRN, Brittany Loaiza PA-C    docusate sodium (COLACE) capsule 100 mg, 100 mg, Oral, BID, Brittany Loaiza PA-C, 100 mg at 12/13/18 1757    levothyroxine tablet 25 mcg, 25 mcg, Oral, Early Morning, Brittany Loaiza PA-C, 25 mcg at 12/14/18 9628    morphine injection 1 mg, 1 mg, Intravenous, Q6H PRN, Brittany Loaiza PA-C    ondansetron [facility]) injection 4 mg, 4 mg, Intravenous, Q6H PRN, Brittany Loaiza PA-C    oxyCODONE (ROXICODONE) IR tablet 2 5 mg, 2 5 mg, Oral, Q4H PRN, Betty Luther MD, 2 5 mg at 12/12/18 0751    oxyCODONE (ROXICODONE) IR tablet 5 mg, 5 mg, Oral, Q4H PRN, Betty Luther MD    Blood Culture:   No results found for: BLOODCX    Wound Culture:   No results found for: WOUNDCULT    Ins and Outs:  I/O last 24 hours:   In: 680 [P O :680]  Out: 1050 [Urine:1050]          Physical:  Vitals:    12/14/18 0724   BP: 128/98   Pulse: 79   Resp: 18   Temp: 98 9 °F (37 2 °C)   SpO2: 96%     left upper extremity  · Dressings clean dry intact - small amount of serosanguineous drainage noted and distal 3rd of the mepilex  · Ecchymoses noted on medial aspect of the arm, skin is intact  · Sensation intact to light touch axillary, musculocutaneous, radial, ulna, median nerves  · Motor grossly intact to axillary, musculocutaneous, radial, ulna, median nerves  · 2+ Rad pulse    _*_*_*_*_*_*_*_*_*_*_*_*_*_*_*_*_*_*_*_*_*_*_*_*_*_*_*_*_*_*_*_*_*_*_*_*_*_*_*_*_*    Assessment: 80 y  o female post operative day 4 left reverse total shoulder arthroplasty   Doing well    Plan:  · Nonweight Bearing left upper extremity in sling at all times  · Up and out of bed  · PT/OT  · may remove sling for pendulums and hygiene  · DVT prophylaxis  · Ice and analgesics  · Acute blood loss anemia improved, hemoglobin  was stable yesterday at 10 2, will continue to monitor clinically for signs and symptoms of anemia but do not anticipate the need for any additional transfusion  · Discharge planning, patient is ortho stable for discharge, awaiting placement in short-term rehab facility hopefully today      [place] PA-C

## 2018-12-14 NOTE — DISCHARGE INSTRUCTIONS
Care after your surgery:  · Ice the surgical site 3-4 times a day for 15 minutes  · Keep the bandages dry at all times  Remove 3 days after surgery and resume showering if the incision is dry  · Apply a clean bandage daily after showering until the staples are removed  · Ambulate with an assistive device until cleared by the physical therapist   · Non-weight bearing left upper extremity in sling at all times  · May remove sling a few times per day for shoulder pendulum exercises  Call the office at [phone] if you have any of the following:  · Excessive redness or drainage at the surgical site  · Fever above 101 5° F   · Pain unrelieved by medication  · Any numbness, tingling, or excessive swelling of the operative extremity  Follow-Up Appointment:  · 10-14 days after surgery with Dr Tawanna Williamson

## 2018-12-14 NOTE — SOCIAL WORK
CM spoke with patient's son Maty Dickey aware that patient is ready for discharge  Patient's son would like a referral sent to [facility] and [facility] in [facility]  Son is agreeable to either facility for discharge   CM will f/u with patient's son Maty Dickey at  (cell)

## 2018-12-14 NOTE — SOCIAL WORK
CM spoke with patient's son [place]  Son is agreeable to discharge to [facility] in [place]  Patient's son is agreeable to CM setting up transport for patient to go to facility  CM provided son with [facility] phone number, admissions at receiving facility  Her number is [phone] All questions/concerns answered via telephone

## 2018-12-14 NOTE — DISCHARGE SUMMARY
Discharge Summary - Orthopedics   Alyson Padilla 80 y o  female MRN: [id number]  Unit/Bed#:     Attending Physician:   Dr Xavier Sandoval    Admitting diagnosis: Closed fracture of proximal end of right humerus, unspecified fracture morphology, initial encounter [S42 201A]    Discharge diagnosis: Closed fracture of proximal end of right humerus, unspecified fracture morphology, initial encounter Yanelis Lomeli    Date of admission: 12/10/2018    Date of discharge: 12/14/18    Procedure:   Right reverse total shoulder arthroplasty for proximal humerus fracture 12/10/18  Hospital course:   44-year-old female who sustained mechanical fall onto her right shoulder resulting in a right proximal humerus fracture  She underwent a right reverse total shoulder arthroplasty by Dr Xavier Sandoval on 12/10/18  After the procedure she was admitted to the orthopedic service  She was transferred to the floor in stable condition  On the floor she received adequate pain control, PT/OT, and medical comanagement with slim  On postoperative day one she was noted to have hemoglobin of 7 5 with associated low blood pressures  She was transfused 2 units of packed red blood cells and provided with a bolus  Her hemoglobin improved as well as her blood pressure  The remainder of her hospital course was unremarkable  She remained hemodynamically stable after receiving the 2 units of packed red blood cells  She was discharged in stable condition to rehab on 12/14/18  Discharge Instructions:   · Nonweightbearing right upper extremity in sling at all times  · Keep incision clean and dry  Daily dressing changes  · Physical therapy  · Follow-up as scheduled, otherwise call for appt  Discharge Medications: For the complete list of discharge medications, please refer to the patient's medication reconciliation

## 2018-12-14 NOTE — SOCIAL WORK
CM spoke with Jerrell Rashid from [facility]  Patient will be picked up at 1300 by [facility]  CM attempted to contact patient's son on his cell phone x 2  Unable to leave a voice message on cell phone  CM will attempt to reach son throughout the day

## 2018-12-14 NOTE — PLAN OF CARE
Problem: DISCHARGE PLANNING - CARE MANAGEMENT  Goal: Discharge to post-acute care or home with appropriate resources  INTERVENTIONS:  - Conduct assessment to determine patient/family and health care team treatment goals, and need for post-acute services based on payer coverage, community resources, and patient preferences, and barriers to discharge  - Address psychosocial, clinical, and financial barriers to discharge as identified in assessment in conjunction with the patient/family and health care team  - Arrange appropriate level of post-acute services according to patients   needs and preference and payer coverage in collaboration with the physician and health care team  - Communicate with and update the patient/family, physician, and health care team regarding progress on the discharge plan  - Arrange appropriate transportation to post-acute venues   Outcome: Completed Date Met: 12/14/18  CM spoke with patient's son Maty Dickey  Son is agreeable to discharge to [facility] in [place]  Patient's son is agreeable to CM setting up transport for patient to go to facility  CM provided son with [facility] phone number, admissions at receiving facility  Her number is [phone] All questions/concerns answered via telephone

## 2018-12-14 NOTE — SOCIAL WORK
TANIA spoke with [facility] from [facility] admissions  Patient is able to be accepted to facility today  [facility] will reach out to patient's son to update  TANIA spoke with Ginna Torres from [facility]  Waiting for callback with  time  Nursing updated with plan of care

## 2018-12-21 ENCOUNTER — OFFICE VISIT (OUTPATIENT)
Dept: OBGYN CLINIC | Facility: CLINIC | Age: 83
End: 2018-12-21

## 2018-12-21 VITALS — DIASTOLIC BLOOD PRESSURE: 74 MMHG | SYSTOLIC BLOOD PRESSURE: 117 MMHG | HEART RATE: 66 BPM

## 2018-12-21 DIAGNOSIS — Z96.612 S/P REVERSE TOTAL SHOULDER ARTHROPLASTY, LEFT: Primary | ICD-10-CM

## 2018-12-21 DIAGNOSIS — S42.292A CLOSED 4-PART FRACTURE OF PROXIMAL HUMERUS, LEFT, INITIAL ENCOUNTER: ICD-10-CM

## 2018-12-21 PROCEDURE — 99024 POSTOP FOLLOW-UP VISIT: CPT | Performed by: PHYSICIAN ASSISTANT

## 2018-12-21 RX ORDER — ACETAMINOPHEN 325 MG/1
650 TABLET ORAL EVERY 6 HOURS PRN
COMMUNITY

## 2018-12-21 NOTE — PROGRESS NOTES
Patient Name:  Beau Belle  MRN:  [id number]    Assessment     1  S/P reverse total shoulder arthroplasty, left     2  Closed 4-part fracture of proximal humerus, left, initial encounter         Plan     Left reverse total shoulder arthroplasty for proximal humerus fracture 12/10/18  1  Continue physical therapy  Protocol provided  2  Continue nonweightbearing left upper extremity in sling at all times  3  Follow-up in four weeks for repeat evaluation with x-rays of the left shoulder  Subjective     14-year-old female returns to the office today status post Left reverse total shoulder arthroplasty for proximal humerus fracture 12/10/18  Today she denies any significant left shoulder pain  She denies incisional erythema or drainage  No numbness or tingling  She has been utilizing the sling and has been compliant with the nonweightbearing status  She is currently at a rehab facility and participating in physical therapy  No fevers or chills  Objective     /74   Pulse 66     Left shoulder: Well-healed incision without erythema or drainage  Staples removed  Steri-Strips applied  Shoulder range of motion is intact without discomfort  Shoulder strength testing is deferred  Elbow wrist and digital range of motion intact  Sensation intact axillary, median, ulnar and radial nerves  2+ radial pulse

## 2019-01-18 ENCOUNTER — OFFICE VISIT (OUTPATIENT)
Dept: OBGYN CLINIC | Facility: CLINIC | Age: 84
End: 2019-01-18

## 2019-01-18 ENCOUNTER — APPOINTMENT (OUTPATIENT)
Dept: RADIOLOGY | Facility: CLINIC | Age: 84
End: 2019-01-18
Payer: MEDICARE

## 2019-01-18 VITALS
DIASTOLIC BLOOD PRESSURE: 78 MMHG | SYSTOLIC BLOOD PRESSURE: 120 MMHG | BODY MASS INDEX: 22.73 KG/M2 | HEIGHT: 60 IN | HEART RATE: 70 BPM

## 2019-01-18 DIAGNOSIS — S42.202D CLOSED FRACTURE OF PROXIMAL END OF LEFT HUMERUS WITH ROUTINE HEALING, UNSPECIFIED FRACTURE MORPHOLOGY, SUBSEQUENT ENCOUNTER: Primary | ICD-10-CM

## 2019-01-18 DIAGNOSIS — M25.512 LEFT SHOULDER PAIN, UNSPECIFIED CHRONICITY: ICD-10-CM

## 2019-01-18 PROCEDURE — 99024 POSTOP FOLLOW-UP VISIT: CPT | Performed by: ORTHOPAEDIC SURGERY

## 2019-01-18 PROCEDURE — 73030 X-RAY EXAM OF SHOULDER: CPT

## 2019-01-18 NOTE — PROGRESS NOTES
Patient Name:  Siva Jones  MRN:  [id number]    Assessment     1  Left shoulder pain, unspecified chronicity  XR shoulder 2+ vw left       Plan   Left reverse total shoulder arthroplasty for proximal humerus fracture performed 12/10/2018  1  Patient may wean out of sling as tolerated  2  WBAT LUE  Return in about 6 weeks (around 3/1/2019)  Will need repeat x-rays  Subjective     41-year-old female presents for follow-up left reverse total shoulder arthroplasty for proximal humerus fracture performed on 12/10/2018  Patient denies pain  Since her last appointment, patient was discharged from rehab facility and now is in assisted living  She continues to physical therapy  Sanjay has been coming out of her sling on her own but states she continues to follow [facility] status  She denies numbness or tingling in her left upper extremity  Denies fevers and chills  Objective     /78   Pulse 70   Ht 5' (1 524 m)   BMI 22 73 kg/m²     Left shoulder:  Well-healed incision without erythema or drainage  Shoulder range-of-motion is forward flexion 110°/130°, external rotation-abduction 50°/70°, and internal rotation-abduction 20°/40°  Strength testing was deferred  Elbow, wrist, finger range of motion is intact  Sensation intact in the axillary, median, ulnar, and radial nerve distributions  2+ radial pulse  Data Review     I have personally reviewed pertinent films in PACS, and my interpretation follows  X-rays of left shoulder performed 01/18/2019:  Reverse left shoulder arthroplasty prosthesis in adequate alignment             Scribe Attestation    I,:   Stevie Harrell PA-C am acting as a scribe while in the presence of the attending physician :        I,:   Carlos Greene MD personally performed the services described in this documentation    as scribed in my presence :

## 2019-03-05 ENCOUNTER — OFFICE VISIT (OUTPATIENT)
Dept: OBGYN CLINIC | Facility: CLINIC | Age: 84
End: 2019-03-05

## 2019-03-05 ENCOUNTER — APPOINTMENT (OUTPATIENT)
Dept: RADIOLOGY | Facility: CLINIC | Age: 84
End: 2019-03-05
Payer: MEDICARE

## 2019-03-05 VITALS
SYSTOLIC BLOOD PRESSURE: 122 MMHG | HEIGHT: 60 IN | DIASTOLIC BLOOD PRESSURE: 74 MMHG | HEART RATE: 61 BPM | BODY MASS INDEX: 22.73 KG/M2

## 2019-03-05 DIAGNOSIS — S42.292A CLOSED 4-PART FRACTURE OF PROXIMAL HUMERUS, LEFT, INITIAL ENCOUNTER: ICD-10-CM

## 2019-03-05 DIAGNOSIS — S42.292A CLOSED 4-PART FRACTURE OF PROXIMAL HUMERUS, LEFT, INITIAL ENCOUNTER: Primary | ICD-10-CM

## 2019-03-05 PROCEDURE — 99024 POSTOP FOLLOW-UP VISIT: CPT | Performed by: ORTHOPAEDIC SURGERY

## 2019-03-05 PROCEDURE — 73030 X-RAY EXAM OF SHOULDER: CPT

## 2019-03-05 RX ORDER — B-COMPLEX WITH VITAMIN C
1 TABLET ORAL
COMMUNITY

## 2019-03-05 RX ORDER — HYDROCORTISONE 0.5 %
CREAM (GRAM) TOPICAL
Qty: 30 G | Refills: 0 | Status: SHIPPED | OUTPATIENT
Start: 2019-03-05

## 2019-03-05 NOTE — PROGRESS NOTES
Patient Name:  Dorie Saxena  MRN:  [id number]    Assessment     1  Closed 4-part fracture of proximal humerus, left, initial encounter  XR shoulder 2+ vw left    Menthol-Methyl Salicylate (ALEXANDRO ARGUELLO GREASELESS) 10-15 % greaseless cream       Plan     Left reverse total shoulder arthroplasty for proximal humerus fracture 12/10/18  1  Weightbearing as tolerated left upper extremity  2  Continue physical therapy for stretching and strengthening  3  Activities as tolerated  4  Prescription for [place]  5  Follow-up in six weeks for repeat evaluation  No x-rays needed unless clinically indicated  Subjective     80-year-old female returns to the office today for follow-up status post Left reverse total shoulder arthroplasty for proximal humerus fracture 12/10/18  Today she notes overall improvement  She does note intermittent discomfort  She recently completed physical therapy and was provided with home exercises  She does note overall improvement with regards to range of motion but still does report some stiffness and weakness  No numbness or tingling  No fevers or chills  Objective     /74   Pulse 61   Ht 5' (1 524 m)   BMI 22 73 kg/m²     Left Shoulder:  No tenderness to palpation  Full healed incision  Passive range of motion includes 130° of forward flexion, 70° of external rotation-abduction, 40° of internal rotation-abduction  Sensation intact axillary, median, ulnar and radial nerves  2+ radial pulse  Data Review     I have personally reviewed pertinent films in PACS, and my interpretation follows  X-rays performed today of the left shoulder reveals stable intact prosthesis in satisfactory alignment  Bony healing is evident        Scribe Attestation    I,:   Rene Montanez PA-C am acting as a scribe while in the presence of the attending physician :        I,:   Tommi Boas, MD personally performed the services described in this documentation    as scribed in my presence :

## 2019-09-22 ENCOUNTER — HOSPITAL ENCOUNTER (INPATIENT)
Facility: HOSPITAL | Age: 84
LOS: 2 days | Discharge: HOME | DRG: 864 | End: 2019-09-24
Attending: EMERGENCY MEDICINE | Admitting: FAMILY MEDICINE
Payer: MEDICARE

## 2019-09-22 ENCOUNTER — APPOINTMENT (EMERGENCY)
Dept: RADIOLOGY | Facility: HOSPITAL | Age: 84
DRG: 864 | End: 2019-09-22
Payer: MEDICARE

## 2019-09-22 DIAGNOSIS — R50.9 FEVER: Primary | ICD-10-CM

## 2019-09-22 DIAGNOSIS — R65.10 SIRS (SYSTEMIC INFLAMMATORY RESPONSE SYNDROME) (HCC): ICD-10-CM

## 2019-09-22 LAB
ALBUMIN SERPL BCP-MCNC: 2.9 G/DL (ref 3.5–5)
ALP SERPL-CCNC: 74 U/L (ref 46–116)
ALT SERPL W P-5'-P-CCNC: 12 U/L (ref 12–78)
ANION GAP SERPL CALCULATED.3IONS-SCNC: 6 MMOL/L (ref 4–13)
AST SERPL W P-5'-P-CCNC: 16 U/L (ref 5–45)
BACTERIA UR QL AUTO: ABNORMAL /HPF
BASOPHILS # BLD AUTO: 0.02 THOUSANDS/ΜL (ref 0–0.1)
BASOPHILS NFR BLD AUTO: 0 % (ref 0–1)
BILIRUB DIRECT SERPL-MCNC: 0.31 MG/DL (ref 0–0.2)
BILIRUB SERPL-MCNC: 1.06 MG/DL (ref 0.2–1)
BILIRUB UR QL STRIP: NEGATIVE
BUN SERPL-MCNC: 15 MG/DL (ref 5–25)
CALCIUM SERPL-MCNC: 10.4 MG/DL (ref 8.3–10.1)
CHLORIDE SERPL-SCNC: 105 MMOL/L (ref 100–108)
CLARITY UR: CLEAR
CO2 SERPL-SCNC: 27 MMOL/L (ref 21–32)
COLOR UR: YELLOW
CREAT SERPL-MCNC: 1.22 MG/DL (ref 0.6–1.3)
EOSINOPHIL # BLD AUTO: 0.06 THOUSAND/ΜL (ref 0–0.61)
EOSINOPHIL NFR BLD AUTO: 1 % (ref 0–6)
ERYTHROCYTE [DISTWIDTH] IN BLOOD BY AUTOMATED COUNT: 13.7 % (ref 11.6–15.1)
GFR SERPL CREATININE-BSD FRML MDRD: 38 ML/MIN/1.73SQ M
GLUCOSE SERPL-MCNC: 101 MG/DL (ref 65–140)
GLUCOSE UR STRIP-MCNC: NEGATIVE MG/DL
HCT VFR BLD AUTO: 37.4 % (ref 34.8–46.1)
HGB BLD-MCNC: 12.2 G/DL (ref 11.5–15.4)
HGB UR QL STRIP.AUTO: ABNORMAL
IMM GRANULOCYTES # BLD AUTO: 0.02 THOUSAND/UL (ref 0–0.2)
IMM GRANULOCYTES NFR BLD AUTO: 0 % (ref 0–2)
KETONES UR STRIP-MCNC: NEGATIVE MG/DL
LACTATE SERPL-SCNC: 1.1 MMOL/L (ref 0.5–2)
LEUKOCYTE ESTERASE UR QL STRIP: NEGATIVE
LYMPHOCYTES # BLD AUTO: 0.83 THOUSANDS/ΜL (ref 0.6–4.47)
LYMPHOCYTES NFR BLD AUTO: 16 % (ref 14–44)
MAGNESIUM SERPL-MCNC: 1.7 MG/DL (ref 1.6–2.6)
MCH RBC QN AUTO: 32.4 PG (ref 26.8–34.3)
MCHC RBC AUTO-ENTMCNC: 32.6 G/DL (ref 31.4–37.4)
MCV RBC AUTO: 99 FL (ref 82–98)
MONOCYTES # BLD AUTO: 0.57 THOUSAND/ΜL (ref 0.17–1.22)
MONOCYTES NFR BLD AUTO: 11 % (ref 4–12)
NEUTROPHILS # BLD AUTO: 3.84 THOUSANDS/ΜL (ref 1.85–7.62)
NEUTS SEG NFR BLD AUTO: 72 % (ref 43–75)
NITRITE UR QL STRIP: NEGATIVE
NON-SQ EPI CELLS URNS QL MICRO: ABNORMAL /HPF
NRBC BLD AUTO-RTO: 0 /100 WBCS
NT-PROBNP SERPL-MCNC: 1177 PG/ML
PH UR STRIP.AUTO: 6.5 [PH] (ref 4.5–8)
PLATELET # BLD AUTO: 101 THOUSANDS/UL (ref 149–390)
PMV BLD AUTO: 10.7 FL (ref 8.9–12.7)
POTASSIUM SERPL-SCNC: 3.5 MMOL/L (ref 3.5–5.3)
PROT SERPL-MCNC: 6.6 G/DL (ref 6.4–8.2)
PROT UR STRIP-MCNC: NEGATIVE MG/DL
RBC # BLD AUTO: 3.77 MILLION/UL (ref 3.81–5.12)
RBC #/AREA URNS AUTO: ABNORMAL /HPF
SODIUM SERPL-SCNC: 138 MMOL/L (ref 136–145)
SP GR UR STRIP.AUTO: 1.02 (ref 1–1.03)
TROPONIN I SERPL-MCNC: 0.03 NG/ML
UROBILINOGEN UR QL STRIP.AUTO: 0.2 E.U./DL
WBC # BLD AUTO: 5.34 THOUSAND/UL (ref 4.31–10.16)
WBC #/AREA URNS AUTO: ABNORMAL /HPF

## 2019-09-22 PROCEDURE — 84484 ASSAY OF TROPONIN QUANT: CPT | Performed by: EMERGENCY MEDICINE

## 2019-09-22 PROCEDURE — 83605 ASSAY OF LACTIC ACID: CPT | Performed by: EMERGENCY MEDICINE

## 2019-09-22 PROCEDURE — 83880 ASSAY OF NATRIURETIC PEPTIDE: CPT | Performed by: EMERGENCY MEDICINE

## 2019-09-22 PROCEDURE — 71045 X-RAY EXAM CHEST 1 VIEW: CPT

## 2019-09-22 PROCEDURE — 83735 ASSAY OF MAGNESIUM: CPT | Performed by: EMERGENCY MEDICINE

## 2019-09-22 PROCEDURE — 99285 EMERGENCY DEPT VISIT HI MDM: CPT

## 2019-09-22 PROCEDURE — 80076 HEPATIC FUNCTION PANEL: CPT | Performed by: EMERGENCY MEDICINE

## 2019-09-22 PROCEDURE — 81001 URINALYSIS AUTO W/SCOPE: CPT

## 2019-09-22 PROCEDURE — 36415 COLL VENOUS BLD VENIPUNCTURE: CPT | Performed by: EMERGENCY MEDICINE

## 2019-09-22 PROCEDURE — 96361 HYDRATE IV INFUSION ADD-ON: CPT

## 2019-09-22 PROCEDURE — 96365 THER/PROPH/DIAG IV INF INIT: CPT

## 2019-09-22 PROCEDURE — 85025 COMPLETE CBC W/AUTO DIFF WBC: CPT | Performed by: EMERGENCY MEDICINE

## 2019-09-22 PROCEDURE — 99285 EMERGENCY DEPT VISIT HI MDM: CPT | Performed by: EMERGENCY MEDICINE

## 2019-09-22 PROCEDURE — 93005 ELECTROCARDIOGRAM TRACING: CPT

## 2019-09-22 PROCEDURE — 80048 BASIC METABOLIC PNL TOTAL CA: CPT | Performed by: EMERGENCY MEDICINE

## 2019-09-22 PROCEDURE — 87040 BLOOD CULTURE FOR BACTERIA: CPT | Performed by: EMERGENCY MEDICINE

## 2019-09-22 RX ORDER — SERTRALINE HYDROCHLORIDE 25 MG/1
50 TABLET, FILM COATED ORAL DAILY
COMMUNITY

## 2019-09-22 RX ORDER — FLUTICASONE PROPIONATE 50 MCG
1 SPRAY, SUSPENSION (ML) NASAL DAILY
COMMUNITY

## 2019-09-22 RX ORDER — LOPERAMIDE HYDROCHLORIDE 2 MG/1
2 CAPSULE ORAL 4 TIMES DAILY PRN
COMMUNITY
End: 2021-04-21

## 2019-09-22 RX ORDER — ACETAMINOPHEN 325 MG/1
650 TABLET ORAL ONCE
Status: COMPLETED | OUTPATIENT
Start: 2019-09-22 | End: 2019-09-22

## 2019-09-22 RX ORDER — ATORVASTATIN CALCIUM 80 MG/1
80 TABLET, FILM COATED ORAL DAILY
COMMUNITY

## 2019-09-22 RX ORDER — LANOLIN ALCOHOL/MO/W.PET/CERES
CREAM (GRAM) TOPICAL DAILY
COMMUNITY

## 2019-09-22 RX ORDER — TRAZODONE HYDROCHLORIDE 50 MG/1
50 TABLET ORAL
COMMUNITY

## 2019-09-22 RX ORDER — CLOPIDOGREL BISULFATE 75 MG/1
75 TABLET ORAL DAILY
COMMUNITY

## 2019-09-22 RX ADMIN — CEFEPIME HYDROCHLORIDE 2000 MG: 2 INJECTION, POWDER, FOR SOLUTION INTRAVENOUS at 23:18

## 2019-09-22 RX ADMIN — ACETAMINOPHEN 650 MG: 325 TABLET ORAL at 20:16

## 2019-09-22 RX ADMIN — SODIUM CHLORIDE 500 ML: 0.9 INJECTION, SOLUTION INTRAVENOUS at 20:14

## 2019-09-23 ENCOUNTER — APPOINTMENT (INPATIENT)
Dept: CT IMAGING | Facility: HOSPITAL | Age: 84
DRG: 864 | End: 2019-09-23
Payer: MEDICARE

## 2019-09-23 PROBLEM — E83.52 HYPERCALCEMIA: Status: ACTIVE | Noted: 2019-09-23

## 2019-09-23 PROBLEM — F03.90 DEMENTIA (HCC): Status: ACTIVE | Noted: 2019-09-23

## 2019-09-23 PROBLEM — R50.9 FEVER: Status: ACTIVE | Noted: 2019-09-23

## 2019-09-23 PROBLEM — R65.10 SIRS (SYSTEMIC INFLAMMATORY RESPONSE SYNDROME) (HCC): Status: ACTIVE | Noted: 2019-09-23

## 2019-09-23 LAB
ATRIAL RATE: 69 BPM
ERYTHROCYTE [DISTWIDTH] IN BLOOD BY AUTOMATED COUNT: 14 % (ref 11.6–15.1)
HCT VFR BLD AUTO: 36.8 % (ref 34.8–46.1)
HGB BLD-MCNC: 12 G/DL (ref 11.5–15.4)
MCH RBC QN AUTO: 32.7 PG (ref 26.8–34.3)
MCHC RBC AUTO-ENTMCNC: 32.6 G/DL (ref 31.4–37.4)
MCV RBC AUTO: 100 FL (ref 82–98)
P AXIS: 83 DEGREES
PLATELET # BLD AUTO: 89 THOUSANDS/UL (ref 149–390)
PMV BLD AUTO: 10.3 FL (ref 8.9–12.7)
PR INTERVAL: 190 MS
PROCALCITONIN SERPL-MCNC: 0.1 NG/ML
QRS AXIS: -52 DEGREES
QRSD INTERVAL: 128 MS
QT INTERVAL: 412 MS
QTC INTERVAL: 441 MS
RBC # BLD AUTO: 3.67 MILLION/UL (ref 3.81–5.12)
T WAVE AXIS: 59 DEGREES
VENTRICULAR RATE: 69 BPM
WBC # BLD AUTO: 3.66 THOUSAND/UL (ref 4.31–10.16)

## 2019-09-23 PROCEDURE — 74176 CT ABD & PELVIS W/O CONTRAST: CPT

## 2019-09-23 PROCEDURE — 84145 PROCALCITONIN (PCT): CPT | Performed by: PHYSICIAN ASSISTANT

## 2019-09-23 PROCEDURE — 99223 1ST HOSP IP/OBS HIGH 75: CPT | Performed by: STUDENT IN AN ORGANIZED HEALTH CARE EDUCATION/TRAINING PROGRAM

## 2019-09-23 PROCEDURE — 93010 ELECTROCARDIOGRAM REPORT: CPT | Performed by: INTERNAL MEDICINE

## 2019-09-23 PROCEDURE — 85027 COMPLETE CBC AUTOMATED: CPT | Performed by: PHYSICIAN ASSISTANT

## 2019-09-23 RX ORDER — ACETAMINOPHEN 325 MG/1
650 TABLET ORAL EVERY 6 HOURS PRN
Status: DISCONTINUED | OUTPATIENT
Start: 2019-09-23 | End: 2019-09-24 | Stop reason: HOSPADM

## 2019-09-23 RX ORDER — TRAZODONE HYDROCHLORIDE 50 MG/1
50 TABLET ORAL
Status: DISCONTINUED | OUTPATIENT
Start: 2019-09-23 | End: 2019-09-24 | Stop reason: HOSPADM

## 2019-09-23 RX ORDER — BISACODYL 10 MG
10 SUPPOSITORY, RECTAL RECTAL DAILY PRN
Status: DISCONTINUED | OUTPATIENT
Start: 2019-09-23 | End: 2019-09-24 | Stop reason: HOSPADM

## 2019-09-23 RX ORDER — SODIUM CHLORIDE 9 MG/ML
75 INJECTION, SOLUTION INTRAVENOUS CONTINUOUS
Status: DISPENSED | OUTPATIENT
Start: 2019-09-23 | End: 2019-09-23

## 2019-09-23 RX ORDER — FLUTICASONE PROPIONATE 50 MCG
1 SPRAY, SUSPENSION (ML) NASAL DAILY
Status: DISCONTINUED | OUTPATIENT
Start: 2019-09-23 | End: 2019-09-24 | Stop reason: HOSPADM

## 2019-09-23 RX ORDER — LOPERAMIDE HYDROCHLORIDE 2 MG/1
2 CAPSULE ORAL 4 TIMES DAILY PRN
Status: DISCONTINUED | OUTPATIENT
Start: 2019-09-23 | End: 2019-09-24 | Stop reason: HOSPADM

## 2019-09-23 RX ORDER — PROPRANOLOL HYDROCHLORIDE 80 MG/1
160 CAPSULE, EXTENDED RELEASE ORAL DAILY
Status: DISCONTINUED | OUTPATIENT
Start: 2019-09-23 | End: 2019-09-24 | Stop reason: HOSPADM

## 2019-09-23 RX ORDER — HEPARIN SODIUM 5000 [USP'U]/ML
5000 INJECTION, SOLUTION INTRAVENOUS; SUBCUTANEOUS EVERY 8 HOURS SCHEDULED
Status: DISCONTINUED | OUTPATIENT
Start: 2019-09-23 | End: 2019-09-24 | Stop reason: HOSPADM

## 2019-09-23 RX ORDER — MELATONIN
1000 DAILY
Status: DISCONTINUED | OUTPATIENT
Start: 2019-09-23 | End: 2019-09-24 | Stop reason: HOSPADM

## 2019-09-23 RX ORDER — LEVOTHYROXINE SODIUM 0.03 MG/1
25 TABLET ORAL
Status: DISCONTINUED | OUTPATIENT
Start: 2019-09-23 | End: 2019-09-24 | Stop reason: HOSPADM

## 2019-09-23 RX ORDER — MAGNESIUM HYDROXIDE/ALUMINUM HYDROXICE/SIMETHICONE 120; 1200; 1200 MG/30ML; MG/30ML; MG/30ML
30 SUSPENSION ORAL EVERY 6 HOURS PRN
Status: DISCONTINUED | OUTPATIENT
Start: 2019-09-23 | End: 2019-09-24 | Stop reason: HOSPADM

## 2019-09-23 RX ORDER — CLOPIDOGREL BISULFATE 75 MG/1
75 TABLET ORAL DAILY
Status: DISCONTINUED | OUTPATIENT
Start: 2019-09-23 | End: 2019-09-24 | Stop reason: HOSPADM

## 2019-09-23 RX ORDER — ONDANSETRON 2 MG/ML
4 INJECTION INTRAMUSCULAR; INTRAVENOUS EVERY 6 HOURS PRN
Status: DISCONTINUED | OUTPATIENT
Start: 2019-09-23 | End: 2019-09-24 | Stop reason: HOSPADM

## 2019-09-23 RX ORDER — ATORVASTATIN CALCIUM 80 MG/1
80 TABLET, FILM COATED ORAL DAILY
Status: DISCONTINUED | OUTPATIENT
Start: 2019-09-23 | End: 2019-09-24 | Stop reason: HOSPADM

## 2019-09-23 RX ORDER — ASPIRIN 81 MG/1
81 TABLET, CHEWABLE ORAL DAILY
Status: DISCONTINUED | OUTPATIENT
Start: 2019-09-23 | End: 2019-09-24 | Stop reason: HOSPADM

## 2019-09-23 RX ORDER — B-COMPLEX WITH VITAMIN C
1 TABLET ORAL
Status: DISCONTINUED | OUTPATIENT
Start: 2019-09-23 | End: 2019-09-24 | Stop reason: HOSPADM

## 2019-09-23 RX ORDER — SERTRALINE HYDROCHLORIDE 25 MG/1
25 TABLET, FILM COATED ORAL DAILY
Status: DISCONTINUED | OUTPATIENT
Start: 2019-09-23 | End: 2019-09-24 | Stop reason: HOSPADM

## 2019-09-23 RX ORDER — POTASSIUM CHLORIDE 20 MEQ/1
20 TABLET, EXTENDED RELEASE ORAL DAILY
Status: DISCONTINUED | OUTPATIENT
Start: 2019-09-23 | End: 2019-09-24 | Stop reason: HOSPADM

## 2019-09-23 RX ADMIN — CYANOCOBALAMIN TAB 500 MCG 1000 MCG: 500 TAB at 09:12

## 2019-09-23 RX ADMIN — SERTRALINE HYDROCHLORIDE 25 MG: 25 TABLET ORAL at 09:12

## 2019-09-23 RX ADMIN — ATORVASTATIN CALCIUM 80 MG: 80 TABLET, FILM COATED ORAL at 09:12

## 2019-09-23 RX ADMIN — HEPARIN SODIUM 5000 UNITS: 5000 INJECTION INTRAVENOUS; SUBCUTANEOUS at 05:24

## 2019-09-23 RX ADMIN — ASPIRIN 81 MG 81 MG: 81 TABLET ORAL at 09:12

## 2019-09-23 RX ADMIN — POTASSIUM CHLORIDE 20 MEQ: 1500 TABLET, EXTENDED RELEASE ORAL at 09:12

## 2019-09-23 RX ADMIN — FLUTICASONE PROPIONATE 1 SPRAY: 50 SPRAY, METERED NASAL at 09:13

## 2019-09-23 RX ADMIN — LEVOTHYROXINE SODIUM 25 MCG: 25 TABLET ORAL at 05:24

## 2019-09-23 RX ADMIN — PROPRANOLOL HYDROCHLORIDE 160 MG: 80 CAPSULE, EXTENDED RELEASE ORAL at 10:09

## 2019-09-23 RX ADMIN — SODIUM CHLORIDE 75 ML/HR: 0.9 INJECTION, SOLUTION INTRAVENOUS at 01:43

## 2019-09-23 RX ADMIN — ACETAMINOPHEN 650 MG: 325 TABLET ORAL at 06:16

## 2019-09-23 RX ADMIN — HEPARIN SODIUM 5000 UNITS: 5000 INJECTION INTRAVENOUS; SUBCUTANEOUS at 01:00

## 2019-09-23 RX ADMIN — VITAMIN D, TAB 1000IU (100/BT) 1000 UNITS: 25 TAB at 09:12

## 2019-09-23 RX ADMIN — CLOPIDOGREL BISULFATE 75 MG: 75 TABLET ORAL at 09:12

## 2019-09-23 RX ADMIN — CALCIUM CARBONATE-VITAMIN D TAB 500 MG-200 UNIT 1 TABLET: 500-200 TAB at 09:13

## 2019-09-23 RX ADMIN — HEPARIN SODIUM 5000 UNITS: 5000 INJECTION INTRAVENOUS; SUBCUTANEOUS at 14:02

## 2019-09-23 RX ADMIN — HEPARIN SODIUM 5000 UNITS: 5000 INJECTION INTRAVENOUS; SUBCUTANEOUS at 22:50

## 2019-09-23 NOTE — ASSESSMENT & PLAN NOTE
Creatinine 1 22 at time of presentation, appears to be around patient's baseline  Avoid nephrotoxins and hypotension  Continue to monitor

## 2019-09-23 NOTE — PLAN OF CARE
Problem: Potential for Falls  Goal: Patient will remain free of falls  Description  INTERVENTIONS:  - Assess patient frequently for physical needs  -  Identify cognitive and physical deficits and behaviors that affect risk of falls    -  [place] fall precautions as indicated by assessment   - Educate patient/family on patient safety including physical limitations  - Instruct patient to call for assistance with activity based on assessment  - Modify environment to reduce risk of injury  - Consider OT/PT consult to assist with strengthening/mobility  Outcome: Progressing     Problem: RESPIRATORY - ADULT  Goal: Achieves optimal ventilation and oxygenation  Description  INTERVENTIONS:  - Assess for changes in respiratory status  - Assess for changes in mentation and behavior  - Position to facilitate oxygenation and minimize respiratory effort  - Oxygen administered by appropriate delivery if ordered  - Initiate smoking cessation education as indicated  - Encourage broncho-pulmonary hygiene including cough, deep breathe, Incentive Spirometry  - Assess the need for suctioning and aspirate as needed  - Assess and instruct to report SOB or any respiratory difficulty  - Respiratory Therapy support as indicated  Outcome: Progressing     Problem: METABOLIC, FLUID AND ELECTROLYTES - ADULT  Goal: Electrolytes maintained within normal limits  Description  INTERVENTIONS:  - Monitor labs and assess patient for signs and symptoms of electrolyte imbalances  - Administer electrolyte replacement as ordered  - Monitor response to electrolyte replacements, including repeat lab results as appropriate  - Instruct patient on fluid and nutrition as appropriate  Outcome: Progressing  Goal: Fluid balance maintained  Description  INTERVENTIONS:  - Monitor labs   - Monitor I/O and WT  - Instruct patient on fluid and nutrition as appropriate  - Assess for signs & symptoms of volume excess or deficit  Outcome: Progressing  Goal: Glucose maintained within target range  Description  INTERVENTIONS:  - Monitor Blood Glucose as ordered  - Assess for signs and symptoms of hyperglycemia and hypoglycemia  - Administer ordered medications to maintain glucose within target range  - Assess nutritional intake and initiate nutrition service referral as needed  Outcome: Progressing     Problem: SKIN/TISSUE INTEGRITY - ADULT  Goal: Skin integrity remains intact  Description  INTERVENTIONS  - Identify patients at risk for skin breakdown  - Assess and monitor skin integrity  - Assess and monitor nutrition and hydration status  - Monitor labs (i e  albumin)  - Assess for incontinence   - Turn and reposition patient  - Assist with mobility/ambulation  - Relieve pressure over bony prominences  - Avoid friction and shearing  - Provide appropriate hygiene as needed including keeping skin clean and dry  - Evaluate need for skin moisturizer/barrier cream  - Collaborate with interdisciplinary team (i e  Nutrition, Rehabilitation, etc )   - Patient/family teaching  Outcome: Progressing  Goal: Incision(s), wounds(s) or drain site(s) healing without S/S of infection  Description  INTERVENTIONS  - Assess and document risk factors for skin impairment   - Assess and document dressing, incision, wound bed, drain sites and surrounding tissue  - Consider nutrition services referral as needed  - Oral mucous membranes remain intact  - Provide patient/ family education  Outcome: Progressing  Goal: Oral mucous membranes remain intact  Description  INTERVENTIONS  - Assess oral mucosa and hygiene practices  - Implement preventative oral hygiene regimen  - Implement oral medicated treatments as ordered  - Initiate Nutrition services referral as needed  Outcome: Progressing     Problem: HEMATOLOGIC - ADULT  Goal: Maintains hematologic stability  Description  INTERVENTIONS  - Assess for signs and symptoms of bleeding or hemorrhage  - Monitor labs  - Administer supportive blood products/factors as ordered and appropriate  Outcome: Progressing     Problem: MUSCULOSKELETAL - ADULT  Goal: Maintain or return mobility to safest level of function  Description  INTERVENTIONS:  - Assess patient's ability to carry out ADLs; assess patient's baseline for ADL function and identify physical deficits which impact ability to perform ADLs (bathing, care of mouth/teeth, toileting, grooming, dressing, etc )  - Assess/evaluate cause of self-care deficits   - Assess range of motion  - Assess patient's mobility  - Assess patient's need for assistive devices and provide as appropriate  - Encourage maximum independence but intervene and supervise when necessary  - Involve family in performance of ADLs  - Assess for home care needs following discharge   - Consider OT consult to assist with ADL evaluation and planning for discharge  - Provide patient education as appropriate  Outcome: Progressing  Goal: Maintain proper alignment of affected body part  Description  INTERVENTIONS:  - Support, maintain and protect limb and body alignment  - Provide patient/ family with appropriate education  Outcome: Progressing     Problem: PAIN - ADULT  Goal: Verbalizes/displays adequate comfort level or baseline comfort level  Description  Interventions:  - Encourage patient to monitor pain and request assistance  - Assess pain using appropriate pain scale  - Administer analgesics based on type and severity of pain and evaluate response  - Implement non-pharmacological measures as appropriate and evaluate response  - Consider cultural and social influences on pain and pain management  - Notify physician/advanced practitioner if interventions unsuccessful or patient reports new pain  Outcome: Progressing     Problem: INFECTION - ADULT  Goal: Absence or prevention of progression during hospitalization  Description  INTERVENTIONS:  - Assess and monitor for signs and symptoms of infection  - Monitor lab/diagnostic results  - Monitor all insertion sites, i e  indwelling lines, tubes, and drains  - Monitor endotracheal if appropriate and nasal secretions for changes in amount and color  - [place] appropriate cooling/warming therapies per order  - Administer medications as ordered  - Instruct and encourage patient and family to use good hand hygiene technique  - Identify and instruct in appropriate isolation precautions for identified infection/condition  Outcome: Progressing  Goal: Absence of fever/infection during neutropenic period  Description  INTERVENTIONS:  - Monitor WBC    Outcome: Progressing     Problem: SAFETY ADULT  Goal: Maintain or return to baseline ADL function  Description  INTERVENTIONS:  -  Assess patient's ability to carry out ADLs; assess patient's baseline for ADL function and identify physical deficits which impact ability to perform ADLs (bathing, care of mouth/teeth, toileting, grooming, dressing, etc )  - Assess/evaluate cause of self-care deficits   - Assess range of motion  - Assess patient's mobility; develop plan if impaired  - Assess patient's need for assistive devices and provide as appropriate  - Encourage maximum independence but intervene and supervise when necessary  - Involve family in performance of ADLs  - Assess for home care needs following discharge   - Consider OT consult to assist with ADL evaluation and planning for discharge  - Provide patient education as appropriate  Outcome: Progressing  Goal: Maintain or return mobility status to optimal level  Description  INTERVENTIONS:  - Assess patient's baseline mobility status (ambulation, transfers, stairs, etc )    - Identify cognitive and physical deficits and behaviors that affect mobility  - Identify mobility aids required to assist with transfers and/or ambulation (gait belt, sit-to-stand, lift, walker, cane, etc )  - [place] fall precautions as indicated by assessment  - Record patient progress and toleration of activity level on Mobility SBAR; progress patient to next Phase/Stage  - Instruct patient to call for assistance with activity based on assessment  - Consider rehabilitation consult to assist with strengthening/weightbearing, etc   Outcome: Progressing     Problem: DISCHARGE PLANNING  Goal: Discharge to home or other facility with appropriate resources  Description  INTERVENTIONS:  - Identify barriers to discharge w/patient and caregiver  - Arrange for needed discharge resources and transportation as appropriate  - Identify discharge learning needs (meds, wound care, etc )  - Arrange for interpretive services to assist at discharge as needed  - Refer to Case Management Department for coordinating discharge planning if the patient needs post-hospital services based on physician/advanced practitioner order or complex needs related to functional status, cognitive ability, or social support system  Outcome: Progressing     Problem: Knowledge Deficit  Goal: Patient/family/caregiver demonstrates understanding of disease process, treatment plan, medications, and discharge instructions  Description  Complete learning assessment and assess knowledge base    Interventions:  - Provide teaching at level of understanding  - Provide teaching via preferred learning methods  Outcome: Progressing     Problem: Prexisting or High Potential for Compromised Skin Integrity  Goal: Skin integrity is maintained or improved  Description  INTERVENTIONS:  - Identify patients at risk for skin breakdown  - Assess and monitor skin integrity  - Assess and monitor nutrition and hydration status  - Monitor labs   - Assess for incontinence   - Turn and reposition patient  - Assist with mobility/ambulation  - Relieve pressure over bony prominences  - Avoid friction and shearing  - Provide appropriate hygiene as needed including keeping skin clean and dry  - Evaluate need for skin moisturizer/barrier cream  - Collaborate with interdisciplinary team   - Patient/family teaching  - Consider wound care consult   Outcome: Progressing

## 2019-09-23 NOTE — PLAN OF CARE
Problem: Potential for Falls  Goal: Patient will remain free of falls  Description  INTERVENTIONS:  - Assess patient frequently for physical needs  -  Identify cognitive and physical deficits and behaviors that affect risk of falls    -  [place] fall precautions as indicated by assessment   - Educate patient/family on patient safety including physical limitations  - Instruct patient to call for assistance with activity based on assessment  - Modify environment to reduce risk of injury  - Consider OT/PT consult to assist with strengthening/mobility  Outcome: Progressing     Problem: RESPIRATORY - ADULT  Goal: Achieves optimal ventilation and oxygenation  Description  INTERVENTIONS:  - Assess for changes in respiratory status  - Assess for changes in mentation and behavior  - Position to facilitate oxygenation and minimize respiratory effort  - Oxygen administered by appropriate delivery if ordered  - Initiate smoking cessation education as indicated  - Encourage broncho-pulmonary hygiene including cough, deep breathe, Incentive Spirometry  - Assess the need for suctioning and aspirate as needed  - Assess and instruct to report SOB or any respiratory difficulty  - Respiratory Therapy support as indicated  Outcome: Progressing     Problem: METABOLIC, FLUID AND ELECTROLYTES - ADULT  Goal: Electrolytes maintained within normal limits  Description  INTERVENTIONS:  - Monitor labs and assess patient for signs and symptoms of electrolyte imbalances  - Administer electrolyte replacement as ordered  - Monitor response to electrolyte replacements, including repeat lab results as appropriate  - Instruct patient on fluid and nutrition as appropriate  Outcome: Progressing  Goal: Fluid balance maintained  Description  INTERVENTIONS:  - Monitor labs   - Monitor I/O and WT  - Instruct patient on fluid and nutrition as appropriate  - Assess for signs & symptoms of volume excess or deficit  Outcome: Progressing  Goal: Glucose maintained within target range  Description  INTERVENTIONS:  - Monitor Blood Glucose as ordered  - Assess for signs and symptoms of hyperglycemia and hypoglycemia  - Administer ordered medications to maintain glucose within target range  - Assess nutritional intake and initiate nutrition service referral as needed  Outcome: Progressing     Problem: SKIN/TISSUE INTEGRITY - ADULT  Goal: Skin integrity remains intact  Description  INTERVENTIONS  - Identify patients at risk for skin breakdown  - Assess and monitor skin integrity  - Assess and monitor nutrition and hydration status  - Monitor labs (i e  albumin)  - Assess for incontinence   - Turn and reposition patient  - Assist with mobility/ambulation  - Relieve pressure over bony prominences  - Avoid friction and shearing  - Provide appropriate hygiene as needed including keeping skin clean and dry  - Evaluate need for skin moisturizer/barrier cream  - Collaborate with interdisciplinary team (i e  Nutrition, Rehabilitation, etc )   - Patient/family teaching  Outcome: Progressing  Goal: Incision(s), wounds(s) or drain site(s) healing without S/S of infection  Description  INTERVENTIONS  - Assess and document risk factors for skin impairment   - Assess and document dressing, incision, wound bed, drain sites and surrounding tissue  - Consider nutrition services referral as needed  - Oral mucous membranes remain intact  - Provide patient/ family education  Outcome: Progressing  Goal: Oral mucous membranes remain intact  Description  INTERVENTIONS  - Assess oral mucosa and hygiene practices  - Implement preventative oral hygiene regimen  - Implement oral medicated treatments as ordered  - Initiate Nutrition services referral as needed  Outcome: Progressing     Problem: HEMATOLOGIC - ADULT  Goal: Maintains hematologic stability  Description  INTERVENTIONS  - Assess for signs and symptoms of bleeding or hemorrhage  - Monitor labs  - Administer supportive blood products/factors as ordered and appropriate  Outcome: Progressing     Problem: MUSCULOSKELETAL - ADULT  Goal: Maintain or return mobility to safest level of function  Description  INTERVENTIONS:  - Assess patient's ability to carry out ADLs; assess patient's baseline for ADL function and identify physical deficits which impact ability to perform ADLs (bathing, care of mouth/teeth, toileting, grooming, dressing, etc )  - Assess/evaluate cause of self-care deficits   - Assess range of motion  - Assess patient's mobility  - Assess patient's need for assistive devices and provide as appropriate  - Encourage maximum independence but intervene and supervise when necessary  - Involve family in performance of ADLs  - Assess for home care needs following discharge   - Consider OT consult to assist with ADL evaluation and planning for discharge  - Provide patient education as appropriate  Outcome: Progressing  Goal: Maintain proper alignment of affected body part  Description  INTERVENTIONS:  - Support, maintain and protect limb and body alignment  - Provide patient/ family with appropriate education  Outcome: Progressing     Problem: PAIN - ADULT  Goal: Verbalizes/displays adequate comfort level or baseline comfort level  Description  Interventions:  - Encourage patient to monitor pain and request assistance  - Assess pain using appropriate pain scale  - Administer analgesics based on type and severity of pain and evaluate response  - Implement non-pharmacological measures as appropriate and evaluate response  - Consider cultural and social influences on pain and pain management  - Notify physician/advanced practitioner if interventions unsuccessful or patient reports new pain  Outcome: Progressing     Problem: INFECTION - ADULT  Goal: Absence or prevention of progression during hospitalization  Description  INTERVENTIONS:  - Assess and monitor for signs and symptoms of infection  - Monitor lab/diagnostic results  - Monitor all insertion sites, i e  indwelling lines, tubes, and drains  - Monitor endotracheal if appropriate and nasal secretions for changes in amount and color  - [place] appropriate cooling/warming therapies per order  - Administer medications as ordered  - Instruct and encourage patient and family to use good hand hygiene technique  - Identify and instruct in appropriate isolation precautions for identified infection/condition  Outcome: Progressing  Goal: Absence of fever/infection during neutropenic period  Description  INTERVENTIONS:  - Monitor WBC    Outcome: Progressing     Problem: SAFETY ADULT  Goal: Maintain or return to baseline ADL function  Description  INTERVENTIONS:  -  Assess patient's ability to carry out ADLs; assess patient's baseline for ADL function and identify physical deficits which impact ability to perform ADLs (bathing, care of mouth/teeth, toileting, grooming, dressing, etc )  - Assess/evaluate cause of self-care deficits   - Assess range of motion  - Assess patient's mobility; develop plan if impaired  - Assess patient's need for assistive devices and provide as appropriate  - Encourage maximum independence but intervene and supervise when necessary  - Involve family in performance of ADLs  - Assess for home care needs following discharge   - Consider OT consult to assist with ADL evaluation and planning for discharge  - Provide patient education as appropriate  Outcome: Progressing  Goal: Maintain or return mobility status to optimal level  Description  INTERVENTIONS:  - Assess patient's baseline mobility status (ambulation, transfers, stairs, etc )    - Identify cognitive and physical deficits and behaviors that affect mobility  - Identify mobility aids required to assist with transfers and/or ambulation (gait belt, sit-to-stand, lift, walker, cane, etc )  - [place] fall precautions as indicated by assessment  - Record patient progress and toleration of activity level on Mobility SBAR; progress patient to next Phase/Stage  - Instruct patient to call for assistance with activity based on assessment  - Consider rehabilitation consult to assist with strengthening/weightbearing, etc   Outcome: Progressing     Problem: DISCHARGE PLANNING  Goal: Discharge to home or other facility with appropriate resources  Description  INTERVENTIONS:  - Identify barriers to discharge w/patient and caregiver  - Arrange for needed discharge resources and transportation as appropriate  - Identify discharge learning needs (meds, wound care, etc )  - Arrange for interpretive services to assist at discharge as needed  - Refer to Case Management Department for coordinating discharge planning if the patient needs post-hospital services based on physician/advanced practitioner order or complex needs related to functional status, cognitive ability, or social support system  Outcome: Progressing     Problem: Knowledge Deficit  Goal: Patient/family/caregiver demonstrates understanding of disease process, treatment plan, medications, and discharge instructions  Description  Complete learning assessment and assess knowledge base    Interventions:  - Provide teaching at level of understanding  - Provide teaching via preferred learning methods  Outcome: Progressing

## 2019-09-23 NOTE — ASSESSMENT & PLAN NOTE
Calcium elevated to 10 4 at time of admission  Does have history of intermittent hypercalcemia  Last PTH in June 2019, wnl  Will provide IV fluid hydration and continue to trend

## 2019-09-23 NOTE — ED PROVIDER NOTES
History  Chief Complaint   Patient presents with    Fever - 76 years or older     Per EMS, patient is from [facility], was incontinent and had a fever  History of dimentia  Patient has no complaints  79 YO female presents form a nursing facility for evaluation of a fever  Pt was found to be febrile tonight, she was incontinent of urine  EMS was called  On arrival to the ED pt was found to have a temperature of 101 5  She offered no complaints but had some confusion which is new  Pt denies CP/SOB/F/C/N/V/D/C  History provided by:  Patient   used: No    Fever - 75 years or older   Max temp prior to arrival:  101 5  Temp source:  Temporal  Severity:  Moderate  Onset quality:  Unable to specify  Timing:  Constant  Progression:  Unchanged  Chronicity:  New  Relieved by:  Nothing  Worsened by:  Nothing  Ineffective treatments:  None tried  Associated symptoms: confusion    Associated symptoms: no chest pain, no chills, no cough, no diarrhea, no dysuria, no nausea, no rash and no vomiting        Prior to Admission Medications   Prescriptions Last Dose Informant Patient Reported? Taking?    Bisacodyl (DULCOLAX RE)   Yes Yes   Sig: Insert into the rectum   Cholecalciferol (VITAMIN D3) 1000 units CAPS  Self Yes Yes   Sig: Daily   Menthol-Methyl Salicylate (ALEXANDRO ARGUELLO GREASELESS) 10-15 % greaseless cream   No Yes   Sig: Applied to affected area QID as needed   Potassium 75 MG TABS  Self Yes Yes   Sig: potassium   Sodium Phosphates (FLEET ENEMA RE)   Yes Yes   Sig: Insert into the rectum   acetaminophen (TYLENOL) 325 mg tablet   Yes Yes   Sig: Take 650 mg by mouth every 6 (six) hours as needed for mild pain   aspirin 81 MG tablet Not Taking at Unknown time Self Yes No   Sig: Take 81 mg by mouth daily   atorvastatin (LIPITOR) 80 mg tablet   Yes Yes   Sig: Take 80 mg by mouth daily   calcium carbonate-vitamin D (OSCAL-D) 500 mg-200 units per tablet   Yes Yes   Sig: Take 1 tablet by mouth daily with breakfast   clopidogrel (PLAVIX) 75 mg tablet   Yes Yes   Sig: Take 75 mg by mouth daily   fluticasone (FLONASE) 50 mcg/act nasal spray   Yes Yes   Si spray into each nostril daily   levothyroxine 25 mcg tablet  Self Yes Yes   Si mcg     loperamide (IMODIUM) 2 mg capsule   Yes Yes   Sig: Take 2 mg by mouth 4 (four) times a day as needed for diarrhea   magnesium hydroxide (MILK OF MAGNESIA) 400 mg/5 mL oral suspension   Yes Yes   Sig: Take by mouth daily as needed for constipation   propranolol (INDERAL LA) 160 mg  Self Yes Yes   Sig: 160 mg daily     sertraline (ZOLOFT) 25 mg tablet   Yes Yes   Sig: Take 25 mg by mouth daily   traZODone (DESYREL) 50 mg tablet   Yes Yes   Sig: Take 50 mg by mouth daily at bedtime as needed for sleep   vitamin B-12 (VITAMIN B-12) 1,000 mcg tablet   Yes Yes   Sig: Take by mouth daily      Facility-Administered Medications: None       Past Medical History:   Diagnosis Date    Disease of thyroid gland     Hypertension        Past Surgical History:   Procedure Laterality Date    EYE SURGERY      CA RECONSTR TOTAL SHOULDER IMPLANT Left 12/10/2018    Procedure: LEFT REVERSE TOTAL SHOULDER ARTHROPLASTY;  Surgeon: Judy Mancilla MD;  Location[facility];  Service: Orthopedics       Family History   Problem Relation Age of Onset    No Known Problems Mother     No Known Problems Father      I have reviewed and agree with the history as documented  Social History     Tobacco Use    Smoking status: Never Smoker    Smokeless tobacco: Never Used   Substance Use Topics    Alcohol use: No    Drug use: No        Review of Systems   Constitutional: Negative for chills, fatigue and fever  HENT: Negative for dental problem  Eyes: Negative for visual disturbance  Respiratory: Negative for cough and shortness of breath  Cardiovascular: Negative for chest pain  Gastrointestinal: Negative for abdominal pain, diarrhea, nausea and vomiting     Genitourinary: Positive for difficulty urinating  Negative for dysuria and frequency  Musculoskeletal: Negative for arthralgias  Skin: Negative for rash  Neurological: Negative for dizziness, weakness and light-headedness  Psychiatric/Behavioral: Positive for confusion  Negative for agitation and behavioral problems  All other systems reviewed and are negative  Physical Exam  Physical Exam   Constitutional: She appears well-developed and well-nourished  HENT:   Head: Normocephalic and atraumatic  Eyes: EOM are normal    Neck: Normal range of motion  Cardiovascular: Normal rate, regular rhythm and normal heart sounds  Pulmonary/Chest: Effort normal and breath sounds normal    Abdominal: Soft  There is no tenderness  Musculoskeletal: Normal range of motion  Neurological: She is alert  Skin: Skin is warm and dry  Psychiatric: She has a normal mood and affect  Her behavior is normal  Thought content normal    Nursing note and vitals reviewed        Vital Signs  ED Triage Vitals [09/22/19 1924]   Temperature Pulse Respirations Blood Pressure SpO2   (!) 101 5 °F (38 6 °C) 68 20 150/84 95 %      Temp Source Heart Rate Source Patient Position - Orthostatic VS BP Location FiO2 (%)   Temporal Monitor Sitting Left arm --      Pain Score       No Pain           Vitals:    09/22/19 2243 09/23/19 0009 09/23/19 0043 09/23/19 0732   BP: 110/66 102/61 133/79 131/80   Pulse: 66 61 58 60   Patient Position - Orthostatic VS: Lying Lying           Visual Acuity  Visual Acuity      Most Recent Value   L Pupil Size (mm)  3   R Pupil Size (mm)  3          ED Medications  Medications   aspirin chewable tablet 81 mg (81 mg Oral Given 9/23/19 0912)   atorvastatin (LIPITOR) tablet 80 mg (80 mg Oral Given 9/23/19 0912)   calcium carbonate-vitamin D (OSCAL-D) 500 mg-200 units per tablet 1 tablet (1 tablet Oral Given 9/23/19 0913)   cholecalciferol (VITAMIN D3) tablet 1,000 Units (1,000 Units Oral Given 9/23/19 0912)   clopidogrel (PLAVIX) tablet 75 mg (75 mg Oral Given 9/23/19 0912)   fluticasone (FLONASE) 50 mcg/act nasal spray 1 spray (1 spray Nasal Given 9/23/19 0913)   propranolol (INDERAL LA) 24 hr capsule 160 mg (160 mg Oral Given 9/23/19 1009)   sertraline (ZOLOFT) tablet 25 mg (25 mg Oral Given 9/23/19 0912)   cyanocobalamin (VITAMIN B-12) tablet 1,000 mcg (1,000 mcg Oral Given 9/23/19 0912)   acetaminophen (TYLENOL) tablet 650 mg (650 mg Oral Given 9/23/19 0616)   loperamide (IMODIUM) capsule 2 mg (has no administration in time range)   magnesium hydroxide (MILK OF MAGNESIA) 400 mg/5 mL oral suspension 15 mL (has no administration in time range)   traZODone (DESYREL) tablet 50 mg (has no administration in time range)   bisacodyl (DULCOLAX) rectal suppository 10 mg (has no administration in time range)   levothyroxine tablet 25 mcg (25 mcg Oral Given 9/23/19 0524)   potassium chloride (K-DUR,KLOR-CON) CR tablet 20 mEq (20 mEq Oral Given 9/23/19 0912)   heparin (porcine) subcutaneous injection 5,000 Units (5,000 Units Subcutaneous Given 9/23/19 0524)   aluminum-magnesium hydroxide-simethicone (MYLANTA) 200-200-20 mg/5 mL oral suspension 30 mL (has no administration in time range)   ondansetron (ZOFRAN) injection 4 mg (has no administration in time range)   sodium chloride 0 9 % infusion (75 mL/hr Intravenous New Bag 9/23/19 0143)   sodium chloride 0 9 % bolus 500 mL (0 mL Intravenous Stopped 9/22/19 2340)   acetaminophen (TYLENOL) tablet 650 mg (650 mg Oral Given 9/22/19 2016)   cefepime (MAXIPIME) 2 g/50 mL dextrose IVPB (0 mg Intravenous Stopped 9/22/19 2355)       Diagnostic Studies  Results Reviewed     Procedure Component Value Units Date/Time    Urine Microscopic [[id number]]  (Abnormal) Collected:  09/22/19 2156    Lab Status:  Final result Specimen:  Urine, Clean Catch Updated:  09/22/19 2309     RBC, UA 4-10 /hpf      WBC, UA None Seen /hpf      Epithelial Cells Occasional /hpf      Bacteria, UA None Seen /hpf     POCT urinalysis dipstick [[id number]]  (Abnormal) Resulted:  09/22/19 2201    Lab Status:  Final result Specimen:  Urine Updated:  09/22/19 2201    ED Urine Macroscopic [[id number]]  (Abnormal) Collected:  09/22/19 2156    Lab Status:  Final result Specimen:  Urine Updated:  09/22/19 2157     Color, UA Yellow     Clarity, UA Clear     pH, UA 6 5     Leukocytes, UA Negative     Nitrite, UA Negative     Protein, UA Negative mg/dl      Glucose, UA Negative mg/dl      Ketones, UA Negative mg/dl      Urobilinogen, UA 0 2 E U /dl      Bilirubin, UA Negative     Blood, UA Trace     Specific [place], UA 1 020    Narrative:       CLINITEK RESULT    Magnesium [[id number]]  (Normal) Collected:  09/22/19 2008    Lab Status:  Final result Specimen:  Blood from Arm, Right Updated:  09/22/19 2054     Magnesium 1 7 mg/dL     B-type natriuretic peptide [[id number]]  (Abnormal) Collected:  09/22/19 2008    Lab Status:  Final result Specimen:  Blood from Arm, Right Updated:  09/22/19 2054     NT-proBNP 1,177 pg/mL     Lactic acid, plasma [[id number]]  (Normal) Collected:  09/22/19 2008    Lab Status:  Final result Specimen:  Blood from Arm, Right Updated:  09/22/19 2050     LACTIC ACID 1 1 mmol/L     Narrative:       Result may be elevated if tourniquet was used during collection      Troponin I [[id number]]  (Normal) Collected:  09/22/19 2008    Lab Status:  Final result Specimen:  Blood from Arm, Right Updated:  09/22/19 2050     Troponin I 0 03 ng/mL     Basic metabolic panel [[id number]]  (Abnormal) Collected:  09/22/19 2008    Lab Status:  Final result Specimen:  Blood from Arm, Right Updated:  09/22/19 2048     Sodium 138 mmol/L      Potassium 3 5 mmol/L      Chloride 105 mmol/L      CO2 27 mmol/L      ANION GAP 6 mmol/L      BUN 15 mg/dL      Creatinine 1 22 mg/dL      Glucose 101 mg/dL      Calcium 10 4 mg/dL      eGFR 38 ml/min/1 73sq m     Narrative:       [place] guidelines for Chronic Kidney Disease (CKD):     Stage 1 with normal or high GFR (GFR > 90 mL/min/1 73 square meters)    Stage 2 Mild CKD (GFR = 60-89 mL/min/1 73 square meters)    Stage 3A Moderate CKD (GFR = 45-59 mL/min/1 73 square meters)    Stage 3B Moderate CKD (GFR = 30-44 mL/min/1 73 square meters)    Stage 4 Severe CKD (GFR = 15-29 mL/min/1 73 square meters)    Stage 5 End Stage CKD (GFR <15 mL/min/1 73 square meters)  Note: GFR calculation is accurate only with a steady state creatinine    Hepatic function panel [[id number]]  (Abnormal) Collected:  09/22/19 2008    Lab Status:  Final result Specimen:  Blood from Arm, Right Updated:  09/22/19 2048     Total Bilirubin 1 06 mg/dL      Bilirubin, Direct 0 31 mg/dL      Alkaline Phosphatase 74 U/L      AST 16 U/L      ALT 12 U/L      Total Protein 6 6 g/dL      Albumin 2 9 g/dL     CBC and differential [[id number]]  (Abnormal) Collected:  09/22/19 2008    Lab Status:  Final result Specimen:  Blood from Arm, Right Updated:  09/22/19 2036     WBC 5 34 Thousand/uL      RBC 3 77 Million/uL      Hemoglobin 12 2 g/dL      Hematocrit 37 4 %      MCV 99 fL      MCH 32 4 pg      MCHC 32 6 g/dL      RDW 13 7 %      MPV 10 7 fL      Platelets 630 Thousands/uL      nRBC 0 /100 WBCs      Neutrophils Relative 72 %      Immat GRANS % 0 %      Lymphocytes Relative 16 %      Monocytes Relative 11 %      Eosinophils Relative 1 %      Basophils Relative 0 %      Neutrophils Absolute 3 84 Thousands/µL      Immature Grans Absolute 0 02 Thousand/uL      Lymphocytes Absolute 0 83 Thousands/µL      Monocytes Absolute 0 57 Thousand/µL      Eosinophils Absolute 0 06 Thousand/µL      Basophils Absolute 0 02 Thousands/µL     Blood culture #2 [[id number]] Collected:  09/22/19 2015    Lab Status: In process Specimen:  Blood from Arm, Left Updated:  09/22/19 2025    Blood culture #1 [[id number]] Collected:  09/22/19 2005    Lab Status:   In process Specimen:  Blood from Arm, Right Updated:  09/22/19 2025                 CT abdomen pelvis wo contrast   Final Result by Elyssa Coley MD (23/68 2095)      1  No imaging explanation for fever  2   Cholelithiasis without evidence of cholecystitis  3   Diffusely ectatic visualized aorta with an infrarenal abdominal aortic aneurysm (3 7 x 3 6 cm)  Annual sonographic surveillance is recommended, if clinically relevant  Workstation performed: [id number]         XR chest 1 view portable   Final Result by Elio Vargas DO (09/23 7370)      Prominent, tortuous thoracic aorta for which aneurysm cannot be excluded  CT imaging could be obtained if relevant  Skinfold right midlung with minimally prominent adjacent bronchovascular markings, grossly stable  This could also be further defined with CT imaging  Otherwise, no acute interval change  Workstation performed: [id number]                    Procedures  Procedures       ED Course                   Initial Sepsis Screening     Row Name 09/22/19 9585                Is the patient's history suggestive of a new or worsening infection? (!) Yes (Proceed)  -DH        Suspected source of infection  urinary tract infection  -DH        Are two or more of the following signs & symptoms of infection both present and new to the patient? (!) Yes (Proceed)  -DH        Indicate SIRS criteria  Hyperthemia > 38 3C (100 9F); Altered mental status  -DH        If the answer is yes to both questions, suspicion of sepsis is present  --        If severe sepsis is present AND tissue hypoperfusion perists in the hour after fluid resuscitation or lactate > 4, the patient meets criteria for SEPTIC SHOCK  --        Are any of the following organ dysfunction criteria present within 6 hours of suspected infection and SIRS criteria that are NOT considered to be chronic conditions?   No  -DH        Organ dysfunction  --        Date of presentation of severe sepsis  --        Time of presentation of severe sepsis  --        Tissue hypoperfusion persists in the hour after crystalloid fluid administration, evidenced, by either:  --        Was hypotension present within one hour of the conclusion of crystalloid fluid administration?  --        Date of presentation of septic shock  --        Time of presentation of septic shock  --          User Key  (r) = Recorded By, (t) = Taken By, (c) = Cosigned By    [address] Name Provider Type    Donal Zhang MD Physician                  MDM  Number of Diagnoses or Management Options  Fever: new and requires workup  SIRS (systemic inflammatory response syndrome) [facility]): new and requires workup  Diagnosis management comments: 1  Fever - Pt offers no complaints, she is not mentating at baseline, however  Will check urine for infection, CXR for PNA and fluid overload, CBC, lactic acid and blood cultures  Pt will likely require admission       Amount and/or Complexity of Data Reviewed  Clinical lab tests: ordered and reviewed  Tests in the radiology section of CPT®: ordered and reviewed  Obtain history from someone other than the patient: yes  Discuss the patient with other providers: yes  Independent visualization of images, tracings, or specimens: yes    Patient Progress  Patient progress: stable      Disposition  Final diagnoses:   Fever   SIRS (systemic inflammatory response syndrome) ([facility] [place] [address] )     Time reflects when diagnosis was documented in both MDM as applicable and the Disposition within this note     Time User Action Codes Description Comment    9/22/2019 11:40 PM Selvin BOWLING Add [R50 9] Fever     9/22/2019 11:40 PM Selvin Melvin [R65 10] SIRS (systemic inflammatory response syndrome) [facility])       ED Disposition     ED Disposition Condition Date/Time Comment    Admit Stable Sun Sep 22, 2019 11:40 PM Case was discussed with SLIM PA and the patient's admission status was agreed to be Admission Status: inpatient status to the service of Dr Jeff Blair           Follow-up Information    None         Current Discharge Medication List      CONTINUE these medications which have NOT CHANGED    Details   acetaminophen (TYLENOL) 325 mg tablet Take 650 mg by mouth every 6 (six) hours as needed for mild pain      atorvastatin (LIPITOR) 80 mg tablet Take 80 mg by mouth daily      Bisacodyl (DULCOLAX RE) Insert into the rectum      calcium carbonate-vitamin D (OSCAL-D) 500 mg-200 units per tablet Take 1 tablet by mouth daily with breakfast      Cholecalciferol (VITAMIN D3) 1000 units CAPS Daily      clopidogrel (PLAVIX) 75 mg tablet Take 75 mg by mouth daily      fluticasone (FLONASE) 50 mcg/act nasal spray 1 spray into each nostril daily      levothyroxine 25 mcg tablet 25 mcg        loperamide (IMODIUM) 2 mg capsule Take 2 mg by mouth 4 (four) times a day as needed for diarrhea      magnesium hydroxide (MILK OF MAGNESIA) 400 mg/5 mL oral suspension Take by mouth daily as needed for constipation      Menthol-Methyl Salicylate (ALEXANDRO ARGUELLO GREASELESS) 10-15 % greaseless cream Applied to affected area QID as needed  Qty: 30 g, Refills: 0    Associated Diagnoses: Closed 4-part fracture of proximal humerus, left, initial encounter      Potassium 75 MG TABS potassium      propranolol (INDERAL LA) 160 mg 160 mg daily        sertraline (ZOLOFT) 25 mg tablet Take 25 mg by mouth daily      Sodium Phosphates (FLEET ENEMA RE) Insert into the rectum      traZODone (DESYREL) 50 mg tablet Take 50 mg by mouth daily at bedtime as needed for sleep      vitamin B-12 (VITAMIN B-12) 1,000 mcg tablet Take by mouth daily      aspirin 81 MG tablet Take 81 mg by mouth daily           No discharge procedures on file      ED Provider  Electronically Signed by           Javed Bentley MD  09/23/19 1713

## 2019-09-23 NOTE — ASSESSMENT & PLAN NOTE
History of dementia, currently oriented only to self   Supportive measures   Will likely be able to return to [facility] at discharge

## 2019-09-23 NOTE — ASSESSMENT & PLAN NOTE
Sent to ED by [facility] for fever, Found to have fever of 101 5  Questionable acute altered mental status vs  Chronic dementia   No leukocytosis, negative lactic acid  Blood cultures pending  UA negative for UTI  Chest XR pending  No signs of acute skin infection   Given one dose of IV cefepime in ED, will hold off on further at this time and check a procalcitonin   CT abdomen pending

## 2019-09-23 NOTE — SOCIAL WORK
Pt is a resident at [facility] at the [place]  Per pt's DIL, she will transport back to facility when medically stable    Will cont to follow to assist with dc poc

## 2019-09-23 NOTE — H&P
[facility]  H&P- [facility] 8/29/1926, 80 y o  female MRN: [id number]    Unit/Bed#: \[facility]\"" 68 2 [facility] [id number] Encounter: [id number]    Primary Care Provider: Salo Chavez DO   Date and time admitted to hospital: 9/22/2019  7:20 PM        * Fever  Assessment & Plan  Sent to ED by above and beyond for fever, Found to have fever of 101 5  Questionable acute altered mental status vs  Chronic dementia   No leukocytosis, negative lactic acid  Blood cultures pending  UA negative for UTI  Chest XR pending  No signs of acute skin infection   Given one dose of IV cefepime in ED, will hold off on further at this time and check a procalcitonin   CT abdomen pending    Hypercalcemia  Assessment & Plan  Calcium elevated to 10 4 at time of admission  Does have history of intermittent hypercalcemia  Last PTH in June 2019, wnl  Will provide IV fluid hydration and continue to trend     CKD (chronic kidney disease) stage 2, GFR 60-89 ml/min  Assessment & Plan  Creatinine 1 22 at time of presentation, appears to be around patient's baseline  Avoid nephrotoxins and hypotension  Continue to monitor     Essential hypertension  Assessment & Plan  Appears controlled at this time  Continue home dose Inderal  Continue to monitor     Dementia  Assessment & Plan  History of dementia, currently oriented only to self   Supportive measures   Will likely be able to return to above and beyond at discharge     VTE Prophylaxis: Heparin  / sequential compression device   Code Status:  Full code  POLST: POLST form is not discussed and not completed at this time  Discussion with family:  None    Anticipated Length of Stay:  Patient will be admitted on an Inpatient basis with an anticipated length of stay of  more than 2 midnights  Justification for Hospital Stay:  Fever of unknown origin    Total Time for Visit, including Counseling / Coordination of Care: 45 minutes    Greater than 50% of this total time spent on direct patient counseling and coordination of care  Chief Complaint:   Fever    History of Present Illness:    Benito Elliott is a 80 y o  female with a past medical history dementia, hypothyroidism, hypertension, CKD who presents with fever  Patient was reportedly brought in from above and beyond for fever today  Patient currently denying any shortness of breath, chest pain, lightheadedness, dizziness, skin rashes or wounds  Patient denies burning with urination  Patient denies any abdominal pain  Patient with baseline history of dementia, unable to provide meaningful history  Patient unsure as to why she was brought into the hospital today  Currently without any complaints  Review of Systems:    Review of Systems   Unable to perform ROS: Dementia       Past Medical and Surgical History:     Past Medical History:   Diagnosis Date    Disease of thyroid gland     Hypertension        Past Surgical History:   Procedure Laterality Date    EYE SURGERY      TN RECONSTR TOTAL SHOULDER IMPLANT Left 12/10/2018    Procedure: LEFT REVERSE TOTAL SHOULDER ARTHROPLASTY;  Surgeon: Francisco J Patel MD;  Location: [facility];  Service: Orthopedics       Meds/Allergies:    Prior to Admission medications    Medication Sig Start Date End Date Taking?  Authorizing Provider   acetaminophen (TYLENOL) 325 mg tablet Take 650 mg by mouth every 6 (six) hours as needed for mild pain   Yes Historical Provider, MD   atorvastatin (LIPITOR) 80 mg tablet Take 80 mg by mouth daily   Yes Historical Provider, MD   Bisacodyl (DULCOLAX RE) Insert into the rectum   Yes Historical Provider, MD   calcium carbonate-vitamin D (OSCAL-D) 500 mg-200 units per tablet Take 1 tablet by mouth daily with breakfast   Yes Historical Provider, MD   Cholecalciferol (VITAMIN D3) 1000 units CAPS Daily   Yes Historical Provider, MD   clopidogrel (PLAVIX) 75 mg tablet Take 75 mg by mouth daily   Yes Historical Provider, MD   fluticasone (FLONASE) 50 mcg/act nasal spray 1 spray into each nostril daily   Yes Historical Provider, MD   levothyroxine 25 mcg tablet 25 mcg   8/18/18  Yes Historical Provider, MD   loperamide (IMODIUM) 2 mg capsule Take 2 mg by mouth 4 (four) times a day as needed for diarrhea   Yes Historical Provider, MD   magnesium hydroxide (MILK OF MAGNESIA) 400 mg/5 mL oral suspension Take by mouth daily as needed for constipation   Yes Historical Provider, MD   Menthol-Methyl Salicylate (ALEXANDRO ARGUELLO GREASELESS) 10-15 % greaseless cream Applied to affected area QID as needed 3/5/19  Yes Courtney Escobar PA-C   Potassium 75 MG TABS potassium   Yes Historical Provider, MD   propranolol (INDERAL LA) 160 mg 160 mg daily   11/26/18  Yes Historical Provider, MD   sertraline (ZOLOFT) 25 mg tablet Take 25 mg by mouth daily   Yes Historical Provider, MD   Sodium Phosphates (FLEET ENEMA RE) Insert into the rectum   Yes Historical Provider, MD   traZODone (DESYREL) 50 mg tablet Take 50 mg by mouth daily at bedtime as needed for sleep   Yes Historical Provider, MD   vitamin B-12 (VITAMIN B-12) 1,000 mcg tablet Take by mouth daily   Yes Historical Provider, MD   aspirin 81 MG tablet Take 81 mg by mouth daily    Historical Provider, MD     I have reveiwed home medications using records provided by [facility]  Allergies: No Known Allergies    Social History:     Marital Status:     Occupation:  Retired  Patient Pre-hospital Living Situation:  Above and beyond  Patient Pre-hospital Level of Mobility:  Unknown  Patient Pre-hospital Diet Restrictions:  unknown  Substance Use History:   Social History     Substance and Sexual Activity   Alcohol Use No     Social History     Tobacco Use   Smoking Status Never Smoker   Smokeless Tobacco Never Used     Social History     Substance and Sexual Activity   Drug Use No       Family History:    Family History   Problem Relation Age of Onset    No Known Problems Mother     No Known Problems Father        Physical Exam:     Vitals:   Blood Pressure: 133/79 (09/23/19 0043)  Pulse: 58 (09/23/19 0043)  Temperature: 97 8 °F (36 6 °C) (09/23/19 0043)  Temp Source: Temporal (09/22/19 2207)  Respirations: 18 (09/23/19 0043)  Weight - Scale: 58 3 kg (128 lb 8 5 oz) (09/22/19 1924)  SpO2: 97 % (09/23/19 0043)    Physical Exam   Constitutional: No distress  HENT:   Head: Normocephalic and atraumatic  Mouth/Throat: Oropharynx is clear and moist    Eyes: Conjunctivae and EOM are normal  No scleral icterus  Abnormal left pupil, chronic   Neck: Neck supple  Cardiovascular: Normal rate, regular rhythm and normal heart sounds  No murmur heard  Pulmonary/Chest: Effort normal and breath sounds normal  No respiratory distress  She has no wheezes  She has no rales  Abdominal: Soft  Bowel sounds are normal  She exhibits no distension  There is no tenderness  There is no guarding  Musculoskeletal: Normal range of motion  She exhibits no edema  Neurological: She is alert  Oriented to self   Skin: Skin is warm and dry  Psychiatric: She has a normal mood and affect  Her speech is delayed  She is slowed  Cognition and memory are impaired  Vitals reviewed  Additional Data:     Lab Results: I have personally reviewed pertinent reports  Results from last 7 days   Lab Units 09/22/19 2008   WBC Thousand/uL 5 34   HEMOGLOBIN g/dL 12 2   HEMATOCRIT % 37 4   PLATELETS Thousands/uL 101*   NEUTROS PCT % 72   LYMPHS PCT % 16   MONOS PCT % 11   EOS PCT % 1     Results from last 7 days   Lab Units 09/22/19 2008   SODIUM mmol/L 138   POTASSIUM mmol/L 3 5   CHLORIDE mmol/L 105   CO2 mmol/L 27   BUN mg/dL 15   CREATININE mg/dL 1 22   ANION GAP mmol/L 6   CALCIUM mg/dL 10 4*   ALBUMIN g/dL 2 9*   TOTAL BILIRUBIN mg/dL 1 06*   ALK PHOS U/L 74   ALT U/L 12   AST U/L 16   GLUCOSE RANDOM mg/dL 101                 Results from last 7 days   Lab Units 09/22/19 2008   LACTIC ACID mmol/L 1 1       Imaging: I have personally reviewed pertinent reports        XR chest 1 view portable    (Results Pending)   CT abdomen pelvis wo contrast    (Results Pending)         Allscripts / Epic Records Reviewed: Yes     ** Please Note: This note has been constructed using a voice recognition system   **

## 2019-09-24 VITALS
HEART RATE: 64 BPM | DIASTOLIC BLOOD PRESSURE: 101 MMHG | RESPIRATION RATE: 18 BRPM | BODY MASS INDEX: 25.1 KG/M2 | OXYGEN SATURATION: 96 % | TEMPERATURE: 98.9 F | WEIGHT: 128.53 LBS | SYSTOLIC BLOOD PRESSURE: 136 MMHG

## 2019-09-24 PROBLEM — E83.52 HYPERCALCEMIA: Status: RESOLVED | Noted: 2019-09-23 | Resolved: 2019-09-24

## 2019-09-24 PROBLEM — E87.6 HYPOKALEMIA: Status: ACTIVE | Noted: 2019-09-24

## 2019-09-24 LAB
ALBUMIN SERPL BCP-MCNC: 2.4 G/DL (ref 3.5–5)
ALP SERPL-CCNC: 59 U/L (ref 46–116)
ALT SERPL W P-5'-P-CCNC: 10 U/L (ref 12–78)
ANION GAP SERPL CALCULATED.3IONS-SCNC: 9 MMOL/L (ref 4–13)
AST SERPL W P-5'-P-CCNC: 21 U/L (ref 5–45)
BASOPHILS # BLD AUTO: 0.01 THOUSANDS/ΜL (ref 0–0.1)
BASOPHILS NFR BLD AUTO: 0 % (ref 0–1)
BILIRUB SERPL-MCNC: 0.73 MG/DL (ref 0.2–1)
BUN SERPL-MCNC: 13 MG/DL (ref 5–25)
CALCIUM SERPL-MCNC: 9.8 MG/DL (ref 8.3–10.1)
CHLORIDE SERPL-SCNC: 109 MMOL/L (ref 100–108)
CO2 SERPL-SCNC: 25 MMOL/L (ref 21–32)
CREAT SERPL-MCNC: 1.16 MG/DL (ref 0.6–1.3)
EOSINOPHIL # BLD AUTO: 0.1 THOUSAND/ΜL (ref 0–0.61)
EOSINOPHIL NFR BLD AUTO: 3 % (ref 0–6)
ERYTHROCYTE [DISTWIDTH] IN BLOOD BY AUTOMATED COUNT: 13.9 % (ref 11.6–15.1)
GFR SERPL CREATININE-BSD FRML MDRD: 41 ML/MIN/1.73SQ M
GLUCOSE SERPL-MCNC: 90 MG/DL (ref 65–140)
HCT VFR BLD AUTO: 34.7 % (ref 34.8–46.1)
HGB BLD-MCNC: 11.3 G/DL (ref 11.5–15.4)
IMM GRANULOCYTES # BLD AUTO: 0.01 THOUSAND/UL (ref 0–0.2)
IMM GRANULOCYTES NFR BLD AUTO: 0 % (ref 0–2)
LYMPHOCYTES # BLD AUTO: 1.33 THOUSANDS/ΜL (ref 0.6–4.47)
LYMPHOCYTES NFR BLD AUTO: 34 % (ref 14–44)
MCH RBC QN AUTO: 32.5 PG (ref 26.8–34.3)
MCHC RBC AUTO-ENTMCNC: 32.6 G/DL (ref 31.4–37.4)
MCV RBC AUTO: 100 FL (ref 82–98)
MONOCYTES # BLD AUTO: 0.58 THOUSAND/ΜL (ref 0.17–1.22)
MONOCYTES NFR BLD AUTO: 15 % (ref 4–12)
NEUTROPHILS # BLD AUTO: 1.9 THOUSANDS/ΜL (ref 1.85–7.62)
NEUTS SEG NFR BLD AUTO: 48 % (ref 43–75)
NRBC BLD AUTO-RTO: 0 /100 WBCS
PLATELET # BLD AUTO: 94 THOUSANDS/UL (ref 149–390)
PMV BLD AUTO: 10.8 FL (ref 8.9–12.7)
POTASSIUM SERPL-SCNC: 3.4 MMOL/L (ref 3.5–5.3)
PROT SERPL-MCNC: 5.6 G/DL (ref 6.4–8.2)
RBC # BLD AUTO: 3.48 MILLION/UL (ref 3.81–5.12)
SODIUM SERPL-SCNC: 143 MMOL/L (ref 136–145)
WBC # BLD AUTO: 3.93 THOUSAND/UL (ref 4.31–10.16)

## 2019-09-24 PROCEDURE — 99239 HOSP IP/OBS DSCHRG MGMT >30: CPT | Performed by: STUDENT IN AN ORGANIZED HEALTH CARE EDUCATION/TRAINING PROGRAM

## 2019-09-24 PROCEDURE — 85025 COMPLETE CBC W/AUTO DIFF WBC: CPT | Performed by: STUDENT IN AN ORGANIZED HEALTH CARE EDUCATION/TRAINING PROGRAM

## 2019-09-24 PROCEDURE — 80053 COMPREHEN METABOLIC PANEL: CPT | Performed by: STUDENT IN AN ORGANIZED HEALTH CARE EDUCATION/TRAINING PROGRAM

## 2019-09-24 RX ORDER — POTASSIUM CHLORIDE 20MEQ/15ML
20 LIQUID (ML) ORAL ONCE
Status: COMPLETED | OUTPATIENT
Start: 2019-09-24 | End: 2019-09-24

## 2019-09-24 RX ADMIN — POTASSIUM CHLORIDE 20 MEQ: 1500 TABLET, EXTENDED RELEASE ORAL at 08:15

## 2019-09-24 RX ADMIN — CYANOCOBALAMIN TAB 500 MCG 1000 MCG: 500 TAB at 08:16

## 2019-09-24 RX ADMIN — FLUTICASONE PROPIONATE 1 SPRAY: 50 SPRAY, METERED NASAL at 08:16

## 2019-09-24 RX ADMIN — CLOPIDOGREL BISULFATE 75 MG: 75 TABLET ORAL at 08:15

## 2019-09-24 RX ADMIN — PROPRANOLOL HYDROCHLORIDE 160 MG: 80 CAPSULE, EXTENDED RELEASE ORAL at 08:16

## 2019-09-24 RX ADMIN — VITAMIN D, TAB 1000IU (100/BT) 1000 UNITS: 25 TAB at 08:15

## 2019-09-24 RX ADMIN — LEVOTHYROXINE SODIUM 25 MCG: 25 TABLET ORAL at 05:21

## 2019-09-24 RX ADMIN — ATORVASTATIN CALCIUM 80 MG: 80 TABLET, FILM COATED ORAL at 08:15

## 2019-09-24 RX ADMIN — SERTRALINE HYDROCHLORIDE 25 MG: 25 TABLET ORAL at 08:16

## 2019-09-24 RX ADMIN — POTASSIUM CHLORIDE 20 MEQ: 20 SOLUTION ORAL at 08:15

## 2019-09-24 RX ADMIN — HEPARIN SODIUM 5000 UNITS: 5000 INJECTION INTRAVENOUS; SUBCUTANEOUS at 05:20

## 2019-09-24 RX ADMIN — ASPIRIN 81 MG 81 MG: 81 TABLET ORAL at 08:15

## 2019-09-24 RX ADMIN — CALCIUM CARBONATE-VITAMIN D TAB 500 MG-200 UNIT 1 TABLET: 500-200 TAB at 08:15

## 2019-09-24 NOTE — PLAN OF CARE
Problem: Potential for Falls  Goal: Patient will remain free of falls  Description  INTERVENTIONS:  - Assess patient frequently for physical needs  -  Identify cognitive and physical deficits and behaviors that affect risk of falls    -  Downs fall precautions as indicated by assessment   - Educate patient/family on patient safety including physical limitations  - Instruct patient to call for assistance with activity based on assessment  - Modify environment to reduce risk of injury  - Consider OT/PT consult to assist with strengthening/mobility  Outcome: Progressing     Problem: RESPIRATORY - ADULT  Goal: Achieves optimal ventilation and oxygenation  Description  INTERVENTIONS:  - Assess for changes in respiratory status  - Assess for changes in mentation and behavior  - Position to facilitate oxygenation and minimize respiratory effort  - Oxygen administered by appropriate delivery if ordered  - Initiate smoking cessation education as indicated  - Encourage broncho-pulmonary hygiene including cough, deep breathe, Incentive Spirometry  - Assess the need for suctioning and aspirate as needed  - Assess and instruct to report SOB or any respiratory difficulty  - Respiratory Therapy support as indicated  Outcome: Progressing     Problem: METABOLIC, FLUID AND ELECTROLYTES - ADULT  Goal: Electrolytes maintained within normal limits  Description  INTERVENTIONS:  - Monitor labs and assess patient for signs and symptoms of electrolyte imbalances  - Administer electrolyte replacement as ordered  - Monitor response to electrolyte replacements, including repeat lab results as appropriate  - Instruct patient on fluid and nutrition as appropriate  Outcome: Progressing  Goal: Fluid balance maintained  Description  INTERVENTIONS:  - Monitor labs   - Monitor I/O and WT  - Instruct patient on fluid and nutrition as appropriate  - Assess for signs & symptoms of volume excess or deficit  Outcome: Progressing  Goal: Glucose maintained within target range  Description  INTERVENTIONS:  - Monitor Blood Glucose as ordered  - Assess for signs and symptoms of hyperglycemia and hypoglycemia  - Administer ordered medications to maintain glucose within target range  - Assess nutritional intake and initiate nutrition service referral as needed  Outcome: Progressing     Problem: SKIN/TISSUE INTEGRITY - ADULT  Goal: Skin integrity remains intact  Description  INTERVENTIONS  - Identify patients at risk for skin breakdown  - Assess and monitor skin integrity  - Assess and monitor nutrition and hydration status  - Monitor labs (i e  albumin)  - Assess for incontinence   - Turn and reposition patient  - Assist with mobility/ambulation  - Relieve pressure over bony prominences  - Avoid friction and shearing  - Provide appropriate hygiene as needed including keeping skin clean and dry  - Evaluate need for skin moisturizer/barrier cream  - Collaborate with interdisciplinary team (i e  Nutrition, Rehabilitation, etc )   - Patient/family teaching  Outcome: Progressing  Goal: Incision(s), wounds(s) or drain site(s) healing without S/S of infection  Description  INTERVENTIONS  - Assess and document risk factors for skin impairment   - Assess and document dressing, incision, wound bed, drain sites and surrounding tissue  - Consider nutrition services referral as needed  - Oral mucous membranes remain intact  - Provide patient/ family education  Outcome: Progressing  Goal: Oral mucous membranes remain intact  Description  INTERVENTIONS  - Assess oral mucosa and hygiene practices  - Implement preventative oral hygiene regimen  - Implement oral medicated treatments as ordered  - Initiate Nutrition services referral as needed  Outcome: Progressing     Problem: HEMATOLOGIC - ADULT  Goal: Maintains hematologic stability  Description  INTERVENTIONS  - Assess for signs and symptoms of bleeding or hemorrhage  - Monitor labs  - Administer supportive blood products/factors as ordered and appropriate  Outcome: Progressing     Problem: MUSCULOSKELETAL - ADULT  Goal: Maintain or return mobility to safest level of function  Description  INTERVENTIONS:  - Assess patient's ability to carry out ADLs; assess patient's baseline for ADL function and identify physical deficits which impact ability to perform ADLs (bathing, care of mouth/teeth, toileting, grooming, dressing, etc )  - Assess/evaluate cause of self-care deficits   - Assess range of motion  - Assess patient's mobility  - Assess patient's need for assistive devices and provide as appropriate  - Encourage maximum independence but intervene and supervise when necessary  - Involve family in performance of ADLs  - Assess for home care needs following discharge   - Consider OT consult to assist with ADL evaluation and planning for discharge  - Provide patient education as appropriate  Outcome: Progressing  Goal: Maintain proper alignment of affected body part  Description  INTERVENTIONS:  - Support, maintain and protect limb and body alignment  - Provide patient/ family with appropriate education  Outcome: Progressing     Problem: PAIN - ADULT  Goal: Verbalizes/displays adequate comfort level or baseline comfort level  Description  Interventions:  - Encourage patient to monitor pain and request assistance  - Assess pain using appropriate pain scale  - Administer analgesics based on type and severity of pain and evaluate response  - Implement non-pharmacological measures as appropriate and evaluate response  - Consider cultural and social influences on pain and pain management  - Notify physician/advanced practitioner if interventions unsuccessful or patient reports new pain  Outcome: Progressing     Problem: INFECTION - ADULT  Goal: Absence or prevention of progression during hospitalization  Description  INTERVENTIONS:  - Assess and monitor for signs and symptoms of infection  - Monitor lab/diagnostic results  - Monitor all insertion sites, i e  indwelling lines, tubes, and drains  - Monitor endotracheal if appropriate and nasal secretions for changes in amount and color  - Webb appropriate cooling/warming therapies per order  - Administer medications as ordered  - Instruct and encourage patient and family to use good hand hygiene technique  - Identify and instruct in appropriate isolation precautions for identified infection/condition  Outcome: Progressing  Goal: Absence of fever/infection during neutropenic period  Description  INTERVENTIONS:  - Monitor WBC    Outcome: Progressing     Problem: SAFETY ADULT  Goal: Maintain or return to baseline ADL function  Description  INTERVENTIONS:  -  Assess patient's ability to carry out ADLs; assess patient's baseline for ADL function and identify physical deficits which impact ability to perform ADLs (bathing, care of mouth/teeth, toileting, grooming, dressing, etc )  - Assess/evaluate cause of self-care deficits   - Assess range of motion  - Assess patient's mobility; develop plan if impaired  - Assess patient's need for assistive devices and provide as appropriate  - Encourage maximum independence but intervene and supervise when necessary  - Involve family in performance of ADLs  - Assess for home care needs following discharge   - Consider OT consult to assist with ADL evaluation and planning for discharge  - Provide patient education as appropriate  Outcome: Progressing  Goal: Maintain or return mobility status to optimal level  Description  INTERVENTIONS:  - Assess patient's baseline mobility status (ambulation, transfers, stairs, etc )    - Identify cognitive and physical deficits and behaviors that affect mobility  - Identify mobility aids required to assist with transfers and/or ambulation (gait belt, sit-to-stand, lift, walker, cane, etc )  - Webb fall precautions as indicated by assessment  - Record patient progress and toleration of activity level on Mobility SBAR; progress patient to next Phase/Stage  - Instruct patient to call for assistance with activity based on assessment  - Consider rehabilitation consult to assist with strengthening/weightbearing, etc   Outcome: Progressing     Problem: DISCHARGE PLANNING  Goal: Discharge to home or other facility with appropriate resources  Description  INTERVENTIONS:  - Identify barriers to discharge w/patient and caregiver  - Arrange for needed discharge resources and transportation as appropriate  - Identify discharge learning needs (meds, wound care, etc )  - Arrange for interpretive services to assist at discharge as needed  - Refer to Case Management Department for coordinating discharge planning if the patient needs post-hospital services based on physician/advanced practitioner order or complex needs related to functional status, cognitive ability, or social support system  Outcome: Progressing     Problem: Knowledge Deficit  Goal: Patient/family/caregiver demonstrates understanding of disease process, treatment plan, medications, and discharge instructions  Description  Complete learning assessment and assess knowledge base    Interventions:  - Provide teaching at level of understanding  - Provide teaching via preferred learning methods  Outcome: Progressing     Problem: Prexisting or High Potential for Compromised Skin Integrity  Goal: Skin integrity is maintained or improved  Description  INTERVENTIONS:  - Identify patients at risk for skin breakdown  - Assess and monitor skin integrity  - Assess and monitor nutrition and hydration status  - Monitor labs   - Assess for incontinence   - Turn and reposition patient  - Assist with mobility/ambulation  - Relieve pressure over bony prominences  - Avoid friction and shearing  - Provide appropriate hygiene as needed including keeping skin clean and dry  - Evaluate need for skin moisturizer/barrier cream  - Collaborate with interdisciplinary team   - Patient/family teaching  - Consider wound care consult   Outcome: Progressing

## 2019-09-24 NOTE — NURSING NOTE
IV site removed, catheter intact  Discharge instructions reviewed with patient's son  Patient son verbalized understanding of same and agrees to patient following up with PCP in 1 week

## 2019-09-24 NOTE — UTILIZATION REVIEW
Initial Clinical Review    Admission: Date/Time/Statement:   Inpatient Admission Orders (From admission, onward)     Ordered        09/22/19 2341  Inpatient Admission  Once                   Orders Placed This Encounter   Procedures    Inpatient Admission     Standing Status:   Standing     Number of Occurrences:   1     Order Specific Question:   Admitting Physician     Answer:   Irene Pickard [[id number]]     Order Specific Question:   Level of Care     Answer:   Med Surg [16]     Order Specific Question:   Estimated length of stay     Answer:   More than 2 Midnights     Order Specific Question:   Certification     Answer:   I certify that inpatient services are medically necessary for this patient for a duration of greater than two midnights  See H&P and MD Progress Notes for additional information about the patient's course of treatment  ED Arrival Information     Expected Arrival Acuity Means of Arrival Escorted By Service Admission Type    - 9/22/2019 19:20 Urgent Ambulance [address] [facility] Medicine Urgent    Arrival Complaint    fever        Chief Complaint   Patient presents with    Fever - 76 years or older     Per EMS, patient is from [facility], was incontinent and had a fever  History of dimentia  Patient has no complaints  Assessment/Plan:     Elsa Lamar is a 80 y o  female with a past medical history dementia, hypothyroidism, hypertension, CKD who presents with fever  Patient was reportedly brought in [facility] for fever today  Patient currently denying any shortness of breath, chest pain, lightheadedness, dizziness, skin rashes or wounds  Patient denies burning with urination  Patient denies any abdominal pain  Patient with baseline history of dementia, unable to provide meaningful history  Patient unsure as to why she was brought into the hospital today    Currently without any complaints          * Fever  Assessment & Plan  Sent to ED by [facility] for fever, Found to have fever of 101 5  Questionable acute altered mental status vs  Chronic dementia   No leukocytosis, negative lactic acid  Blood cultures pending  UA negative for UTI  Chest XR pending  No signs of acute skin infection   Given one dose of IV cefepime in ED, will hold off on further at this time and check a procalcitonin   CT abdomen pending     Hypercalcemia  Assessment & Plan  Calcium elevated to 10 4 at time of admission  Does have history of intermittent hypercalcemia  Last PTH in June 2019, wnl  Will provide IV fluid hydration and continue to trend      CKD (chronic kidney disease) stage 2, GFR 60-89 ml/min  Assessment & Plan  Creatinine 1 22 at time of presentation, appears to be around patient's baseline  Avoid nephrotoxins and hypotension  Continue to monitor      Essential hypertension  Assessment & Plan  Appears controlled at this time  Continue home dose Inderal  Continue to monitor      Dementia  Assessment & Plan  History of dementia, currently oriented only to self   Supportive measures   Will likely be able to return to [facility] at discharge      VTE Prophylaxis: Heparin  / sequential compression device   Code Status:  Full code  POLST: POLST form is not discussed and not completed at this time  Discussion with family:  None     Anticipated Length of Stay:  Patient will be admitted on an Inpatient basis with an anticipated length of stay of  more than 2 midnights     Justification for Hospital Stay:  Fever of unknown origin     ED Triage Vitals [09/22/19 1924]   Temperature Pulse Respirations Blood Pressure SpO2   (!) 101 5 °F (38 6 °C) 68 20 150/84 95 %      Temp Source Heart Rate Source Patient Position - Orthostatic VS BP Location FiO2 (%)   Temporal Monitor Sitting Left arm --      Pain Score       No Pain          Wt Readings from Last 1 Encounters:   09/22/19 58 3 kg (128 lb 8 5 oz)     Additional Vital Signs:     Date/Time  Temp  Pulse  Resp  BP  MAP (mmHg) SpO2  O2 Device  Patient Position - Orthostatic VS   09/24/19 0800  --  --  --  --  --  --  None (Room air)  --   09/24/19 07:35:25  98 9 °F (37 2 °C)  64  18  136/101Abnormal   113  96 %  --  --   09/23/19 2300  --  63  --  124/86  --  --  --  --   09/23/19 21:51:26  99 4 °F (37 4 °C)  67  16  132/104Abnormal   113  97 %  --  --   09/23/19 15:15:39  98 4 °F (36 9 °C)  63  18  127/78  94  96 %  --  --   09/23/19 07:32:50  98 9 °F (37 2 °C)  60  18  131/80  97  96 %  --  --   09/23/19 00:43:23  97 8 °F (36 6 °C)  58  18  133/79  97  97 %  None (Room air)  --   09/23/19 0009  --  61  18  102/61  --  93 %  None (Room air)  Lying   09/22/19 2243  --  66  18  110/66  --  94 %  None (Room air)  Lying   09/22/19 2207  99 4 °F (37 4 °C)  --  --  --  --  --  --  --   09/22/19 2104  --  64  18  117/73  --  98 %  None (Room air)  Lying       Pertinent Labs/Diagnostic Test Results:   Results from last 7 days   Lab Units 09/24/19 0441 09/23/19 0449 09/22/19 2008   WBC Thousand/uL 3 93* 3 66* 5 34   HEMOGLOBIN g/dL 11 3* 12 0 12 2   HEMATOCRIT % 34 7* 36 8 37 4   PLATELETS Thousands/uL 94* 89* 101*   NEUTROS ABS Thousands/µL 1 90  --  3 84         Results from last 7 days   Lab Units 09/24/19 0441 09/22/19 2008   SODIUM mmol/L 143 138   POTASSIUM mmol/L 3 4* 3 5   CHLORIDE mmol/L 109* 105   CO2 mmol/L 25 27   ANION GAP mmol/L 9 6   BUN mg/dL 13 15   CREATININE mg/dL 1 16 1 22   EGFR ml/min/1 73sq m 41 38   CALCIUM mg/dL 9 8 10 4*   MAGNESIUM mg/dL  --  1 7     Results from last 7 days   Lab Units 09/24/19  0441 09/22/19 2008   AST U/L 21 16   ALT U/L 10* 12   ALK PHOS U/L 59 74   TOTAL PROTEIN g/dL 5 6* 6 6   ALBUMIN g/dL 2 4* 2 9*   TOTAL BILIRUBIN mg/dL 0 73 1 06*   BILIRUBIN DIRECT mg/dL  --  0 31*         Results from last 7 days   Lab Units 09/24/19  0441 09/22/19 2008   GLUCOSE RANDOM mg/dL 90 101       Results from last 7 days   Lab Units 09/22/19 2008   TROPONIN I ng/mL 0 03                 Results from last 7 days   Lab Units 09/23/19  0449   PROCALCITONIN ng/ml 0 10     Results from last 7 days   Lab Units 09/22/19 2008   LACTIC ACID mmol/L 1 1             Results from last 7 days   Lab Units 09/22/19 2008   NT-PRO BNP pg/mL 1,177*         Results from last 7 days   Lab Units 09/22/19  2156   CLARITY UA  Clear   COLOR UA  Yellow   SPEC GRAV UA  1 020   PH UA  6 5   GLUCOSE UA mg/dl Negative   KETONES UA mg/dl Negative   BLOOD UA  Trace*   PROTEIN UA mg/dl Negative   NITRITE UA  Negative   BILIRUBIN UA  Negative   UROBILINOGEN UA E U /dl 0 2   LEUKOCYTES UA  Negative   WBC UA /hpf None Seen   RBC UA /hpf 4-10*   BACTERIA UA /hpf None Seen   EPITHELIAL CELLS WET PREP /hpf Occasional       Results from last 7 days   Lab Units 09/22/19 2015 09/22/19 2005   BLOOD CULTURE  No Growth at 24 hrs  No Growth at 24 hrs      9/22 Chest x-ray: Prominent, tortuous thoracic aorta for which aneurysm cannot be excluded  CT imaging could be obtained if relevant  Skinfold right midlung with minimally prominent adjacent bronchovascular markings, grossly stable  This could also be further defined with CT imaging  Otherwise, no acute interval change  9/23 CT A/P: 1  No imaging explanation for fever  2  Cholelithiasis without evidence of cholecystitis  3   Diffusely ectatic visualized aorta with an infrarenal abdominal aortic aneurysm (3 7 x 3 6 cm)  Annual sonographic surveillance is recommended, if clinically relevant        ED Treatment:   Medication Administration from 09/22/2019 1920 to 09/23/2019 0029       Date/Time Order Dose Route Action Action by Comments     09/22/2019 2340 sodium chloride 0 9 % bolus 500 mL 0 mL Intravenous Stopped Heidi Ruiz RN      09/22/2019 2014 sodium chloride 0 9 % bolus 500 mL 500 mL Intravenous New Bag Jewels Lopez RN      09/22/2019 2016 acetaminophen (TYLENOL) tablet 650 mg 650 mg Oral Given Jewels Lopez RN      09/22/2019 0683 cefepime (MAXIPIME) 2 g/50 mL dextrose IVPB 0 mg Intravenous Stopped Carolina Joseph RN      09/22/2019 2318 cefepime (MAXIPIME) 2 g/50 mL dextrose IVPB 2,000 mg Intravenous New Bag Carolina Joseph RN         Past Medical History:   Diagnosis Date    Disease of thyroid gland     Hypertension      Present on Admission:   Essential hypertension   CKD (chronic kidney disease) stage 2, GFR 60-89 ml/min      Admitting Diagnosis: Fever [R50 9]  SIRS (systemic inflammatory response syndrome) (HCC) [R65 10]  Fever in adult [R50 9]  Age/Sex: 80 y o  female     Admission Orders: SCDs        Medications 09/22 09/23 09/24   acetaminophen (TYLENOL) tablet 650 mg   Dose: 650 mg  Freq: Once Route: PO  Start: 09/22/19 1945 End: 09/22/19 2016 2016          aspirin chewable tablet 81 mg   Dose: 81 mg  Freq: Daily Route: PO  Start: 09/23/19 0900 End: 09/24/19 1535     0912      0815     1535-D/C'd      atorvastatin (LIPITOR) tablet 80 mg   Dose: 80 mg  Freq: Daily Route: PO  Start: 09/23/19 0900 End: 09/24/19 1535     0912      0815     1535-D/C'd      calcium carbonate-vitamin D (OSCAL-D) 500 mg-200 units per tablet 1 tablet   Dose: 1 tablet  Freq: Daily with breakfast Route: PO  Start: 09/23/19 0730 End: 09/24/19 1535    Admin Instructions:   [facility]     5236      0815     1535-D/C'd      cefepime (MAXIPIME) 2 g/50 mL dextrose IVPB   Dose: 2,000 mg  Freq: Once Route: IV  Last Dose: Stopped (09/22/19 2355)  Start: 09/22/19 2315 End: 09/22/19 2355    Admin Instructions:   Refrigerate    Order specific questions:   Indication: Pneumonia    2318 2355          cholecalciferol (VITAMIN D3) tablet 1,000 Units   Dose: 1,000 Units  Freq: Daily Route: PO  Start: 09/23/19 0900 End: 09/24/19 1535     0912      0815     1535-D/C'd      clopidogrel (PLAVIX) tablet 75 mg   Dose: 75 mg  Freq: Daily Route: PO  Start: 09/23/19 0900 End: 09/24/19 1535    Admin Instructions:   Do not take with grapefruit or grapefruit juice    [facility]     1509 4526 1535-D/C'd      cyanocobalamin (VITAMIN B-12) tablet 1,000 mcg   Dose: 1,000 mcg  Freq: Daily Route: PO  Start: 09/23/19 0900 End: 09/24/19 1535     0912      0816     1535-D/C'd      fluticasone (FLONASE) 50 mcg/act nasal spray 1 spray   Dose: 1 spray  Freq: Daily Route: NA  Start: 09/23/19 0900 End: 09/24/19 1535    Admin Instructions:   [facility]     0913      1969     1535-D/C'd      heparin (porcine) subcutaneous injection 5,000 Units   Dose: 5,000 Units  Freq: Every 8 hours scheduled Route: SC  Start: 09/23/19 0115 End: 09/24/19 1535    Admin Instructions:   Check for allergies to pork or pork derivatives/dietary restrictions before administration  High alert medication  [facility] [employer] [facility] [employer] [facility]     0100     [id number]     1402     2250      0520     1535-D/C'd      levothyroxine tablet 25 mcg   Dose: 25 mcg  Freq: Daily (early morning) Route: PO  Start: 09/23/19 0600 End: 09/24/19 1535    Admin Instructions:   Administer in the morning on an empty stomach, at least 30 to 60 minutes before food  Tablets may be crushed and suspended in 5-10mls of water for administration  [facility]      1309      9929     1535-D/C'd      potassium chloride (K-DUR,KLOR-CON) CR tablet 20 mEq   Dose: 20 mEq  Freq: Daily Route: PO  Start: 09/23/19 0900 End: 09/24/19 1535    Admin Instructions:   Swallow whole; do not crush or chew  Tablet may be split in half to facilitate swallowing  5006 2914     1535-D/C'd      potassium chloride 10 % oral solution 20 mEq   Dose: 20 mEq  Freq: Once Route: PO  Start: 09/24/19 0800 End: 09/24/19 0815    Admin Instructions:   **DISPOSE IN 8 GALLON BLACK CONTAINER**      0875        propranolol (INDERAL LA) 24 hr capsule 160 mg   Dose: 160 mg  Freq: Daily Route: PO  Start: 09/23/19 0900 End: 09/24/19 1535    Admin Instructions:   Hold for heart rate less than 50 beats per minute  Swallow whole; do not crush, chew or empty contents   [facility] Order specific questions:   Hold for systolic blood pressure less than (mmHg) 110     [phone]     1535-D/C'd      sertraline (ZOLOFT) tablet 25 mg   Dose: 25 mg  Freq: Daily Route: PO  Start: 09/23/19 0900 End: 09/24/19 1535    Admin Instructions:   [facility]     0912      3234     1535-D/C'd      sodium chloride 0 9 % bolus 500 mL   Dose: 500 mL  Freq: Once Route: IV  Indications of Use: FLUID AND ELECTROLYTE DISTURBANCE  Last Dose: Stopped (09/22/19 2340)  Start: 09/22/19 1945 End: 09/22/19 2340 2014 2340          Medications 09/22 09/23 09/24       Continuous Meds Sorted by Name   for Render Beans as of 09/24/19 1723       Medications 09/23 09/24   sodium chloride 0 9 % infusion   Rate: 75 mL/hr Dose: 75 mL/hr  Freq: Continuous Route: IV  Indications of Use: IV Hydration  Last Dose: Stopped (09/23/19 1405)  Start: 09/23/19 0115 End: 09/23/19 1342    0143     1342-D/C'd  1405             PRN Meds Sorted by Name   for Render Beans as of 09/24/19 1723       Medications 09/23 09/24   acetaminophen (TYLENOL) tablet 650 mg   Dose: 650 mg  Freq: Every 6 hours PRN Route: PO  PRN Reasons: mild pain,headaches,fever  Start: 09/23/19 0100 End: 09/24/19 1535    0616      1535-D/C'd      aluminum-magnesium hydroxide-simethicone (MYLANTA) 200-200-20 mg/5 mL oral suspension 30 mL   Dose: 30 mL  Freq: Every 6 hours PRN Route: PO  PRN Reasons: indigestion,heartburn  Start: 09/23/19 0101 End: 09/24/19 1535    Admin Instructions:   Should be taken between meals  Shake well before use       1535-D/C'd      bisacodyl (DULCOLAX) rectal suppository 10 mg   Dose: 10 mg  Freq: Daily PRN Route: RE  PRN Reason: constipation  Start: 09/23/19 0100 End: 09/24/19 1535    Admin Instructions:   1st line for constipation     1535-D/C'd      loperamide (IMODIUM) capsule 2 mg   Dose: 2 mg  Freq: 4 times daily PRN Route: PO  PRN Reason: diarrhea  Start: 09/23/19 0100 End: 09/24/19 1535    Admin Instructions:   Do not exceed 8 caps/day     1535-D/C'd      magnesium hydroxide (MILK OF MAGNESIA) 400 mg/5 mL oral suspension 15 mL   Dose: 15 mL  Freq: Daily PRN Route: PO  PRN Reason: constipation  Start: 09/23/19 0100 End: 09/24/19 1535    Admin Instructions:   2nd line- when bisacodyl ineffective      1535-D/C'd      ondansetron (ZOFRAN) injection 4 mg   Dose: 4 mg  Freq: Every 6 hours PRN Route: IV  PRN Reasons: nausea,vomiting  Start: 09/23/19 0101 End: 09/24/19 1535    Admin Instructions:   Push over 2 minutes  1535-D/C'd      traZODone (DESYREL) tablet 50 mg   Dose: 50 mg  Freq: Daily at bedtime PRN Route: PO  PRN Reason: insomnia  Start: 09/23/19 0100 End: 09/24/19 1535    Admin Instructions:   [facility]     1535-D/C'd      Medications 09/23 09/24               IP CONSULT TO [facility] Utilization Review Department  Phone: [phone]; Fax [phone]  [email or web address]  ATTENTION: Please call with any questions or concerns to [phone]  and carefully listen to the prompts so that you are directed to the right person  Send all requests for admission clinical reviews, approved or denied determinations and any other requests to fax [phone]   All voicemails are confidential

## 2019-09-24 NOTE — PLAN OF CARE
Problem: Potential for Falls  Goal: Patient will remain free of falls  Description  INTERVENTIONS:  - Assess patient frequently for physical needs  -  Identify cognitive and physical deficits and behaviors that affect risk of falls    -  [place] fall precautions as indicated by assessment   - Educate patient/family on patient safety including physical limitations  - Instruct patient to call for assistance with activity based on assessment  - Modify environment to reduce risk of injury  - Consider OT/PT consult to assist with strengthening/mobility  Outcome: Adequate for Discharge     Problem: RESPIRATORY - ADULT  Goal: Achieves optimal ventilation and oxygenation  Description  INTERVENTIONS:  - Assess for changes in respiratory status  - Assess for changes in mentation and behavior  - Position to facilitate oxygenation and minimize respiratory effort  - Oxygen administered by appropriate delivery if ordered  - Initiate smoking cessation education as indicated  - Encourage broncho-pulmonary hygiene including cough, deep breathe, Incentive Spirometry  - Assess the need for suctioning and aspirate as needed  - Assess and instruct to report SOB or any respiratory difficulty  - Respiratory Therapy support as indicated  Outcome: Adequate for Discharge     Problem: METABOLIC, FLUID AND ELECTROLYTES - ADULT  Goal: Electrolytes maintained within normal limits  Description  INTERVENTIONS:  - Monitor labs and assess patient for signs and symptoms of electrolyte imbalances  - Administer electrolyte replacement as ordered  - Monitor response to electrolyte replacements, including repeat lab results as appropriate  - Instruct patient on fluid and nutrition as appropriate  Outcome: Adequate for Discharge  Goal: Fluid balance maintained  Description  INTERVENTIONS:  - Monitor labs   - Monitor I/O and WT  - Instruct patient on fluid and nutrition as appropriate  - Assess for signs & symptoms of volume excess or deficit  Outcome: Adequate for Discharge  Goal: Glucose maintained within target range  Description  INTERVENTIONS:  - Monitor Blood Glucose as ordered  - Assess for signs and symptoms of hyperglycemia and hypoglycemia  - Administer ordered medications to maintain glucose within target range  - Assess nutritional intake and initiate nutrition service referral as needed  Outcome: Adequate for Discharge     Problem: SKIN/TISSUE INTEGRITY - ADULT  Goal: Skin integrity remains intact  Description  INTERVENTIONS  - Identify patients at risk for skin breakdown  - Assess and monitor skin integrity  - Assess and monitor nutrition and hydration status  - Monitor labs (i e  albumin)  - Assess for incontinence   - Turn and reposition patient  - Assist with mobility/ambulation  - Relieve pressure over bony prominences  - Avoid friction and shearing  - Provide appropriate hygiene as needed including keeping skin clean and dry  - Evaluate need for skin moisturizer/barrier cream  - Collaborate with interdisciplinary team (i e  Nutrition, Rehabilitation, etc )   - Patient/family teaching  Outcome: Adequate for Discharge  Goal: Incision(s), wounds(s) or drain site(s) healing without S/S of infection  Description  INTERVENTIONS  - Assess and document risk factors for skin impairment   - Assess and document dressing, incision, wound bed, drain sites and surrounding tissue  - Consider nutrition services referral as needed  - Oral mucous membranes remain intact  - Provide patient/ family education  Outcome: Adequate for Discharge  Goal: Oral mucous membranes remain intact  Description  INTERVENTIONS  - Assess oral mucosa and hygiene practices  - Implement preventative oral hygiene regimen  - Implement oral medicated treatments as ordered  - Initiate Nutrition services referral as needed  Outcome: Adequate for Discharge     Problem: HEMATOLOGIC - ADULT  Goal: Maintains hematologic stability  Description  INTERVENTIONS  - Assess for signs and symptoms of bleeding or hemorrhage  - Monitor labs  - Administer supportive blood products/factors as ordered and appropriate  Outcome: Adequate for Discharge     Problem: MUSCULOSKELETAL - ADULT  Goal: Maintain or return mobility to safest level of function  Description  INTERVENTIONS:  - Assess patient's ability to carry out ADLs; assess patient's baseline for ADL function and identify physical deficits which impact ability to perform ADLs (bathing, care of mouth/teeth, toileting, grooming, dressing, etc )  - Assess/evaluate cause of self-care deficits   - Assess range of motion  - Assess patient's mobility  - Assess patient's need for assistive devices and provide as appropriate  - Encourage maximum independence but intervene and supervise when necessary  - Involve family in performance of ADLs  - Assess for home care needs following discharge   - Consider OT consult to assist with ADL evaluation and planning for discharge  - Provide patient education as appropriate  Outcome: Adequate for Discharge  Goal: Maintain proper alignment of affected body part  Description  INTERVENTIONS:  - Support, maintain and protect limb and body alignment  - Provide patient/ family with appropriate education  Outcome: Adequate for Discharge     Problem: PAIN - ADULT  Goal: Verbalizes/displays adequate comfort level or baseline comfort level  Description  Interventions:  - Encourage patient to monitor pain and request assistance  - Assess pain using appropriate pain scale  - Administer analgesics based on type and severity of pain and evaluate response  - Implement non-pharmacological measures as appropriate and evaluate response  - Consider cultural and social influences on pain and pain management  - Notify physician/advanced practitioner if interventions unsuccessful or patient reports new pain  Outcome: Adequate for Discharge     Problem: INFECTION - ADULT  Goal: Absence or prevention of progression during hospitalization  Description  INTERVENTIONS:  - Assess and monitor for signs and symptoms of infection  - Monitor lab/diagnostic results  - Monitor all insertion sites, i e  indwelling lines, tubes, and drains  - Monitor endotracheal if appropriate and nasal secretions for changes in amount and color  - [place] appropriate cooling/warming therapies per order  - Administer medications as ordered  - Instruct and encourage patient and family to use good hand hygiene technique  - Identify and instruct in appropriate isolation precautions for identified infection/condition  Outcome: Adequate for Discharge  Goal: Absence of fever/infection during neutropenic period  Description  INTERVENTIONS:  - Monitor WBC    Outcome: Adequate for Discharge     Problem: SAFETY ADULT  Goal: Maintain or return to baseline ADL function  Description  INTERVENTIONS:  -  Assess patient's ability to carry out ADLs; assess patient's baseline for ADL function and identify physical deficits which impact ability to perform ADLs (bathing, care of mouth/teeth, toileting, grooming, dressing, etc )  - Assess/evaluate cause of self-care deficits   - Assess range of motion  - Assess patient's mobility; develop plan if impaired  - Assess patient's need for assistive devices and provide as appropriate  - Encourage maximum independence but intervene and supervise when necessary  - Involve family in performance of ADLs  - Assess for home care needs following discharge   - Consider OT consult to assist with ADL evaluation and planning for discharge  - Provide patient education as appropriate  Outcome: Adequate for Discharge  Goal: Maintain or return mobility status to optimal level  Description  INTERVENTIONS:  - Assess patient's baseline mobility status (ambulation, transfers, stairs, etc )    - Identify cognitive and physical deficits and behaviors that affect mobility  - Identify mobility aids required to assist with transfers and/or ambulation (gait belt, sit-to-stand, lift, walker, cane, etc )  - [place] fall precautions as indicated by assessment  - Record patient progress and toleration of activity level on Mobility SBAR; progress patient to next Phase/Stage  - Instruct patient to call for assistance with activity based on assessment  - Consider rehabilitation consult to assist with strengthening/weightbearing, etc   Outcome: Adequate for Discharge     Problem: DISCHARGE PLANNING  Goal: Discharge to home or other facility with appropriate resources  Description  INTERVENTIONS:  - Identify barriers to discharge w/patient and caregiver  - Arrange for needed discharge resources and transportation as appropriate  - Identify discharge learning needs (meds, wound care, etc )  - Arrange for interpretive services to assist at discharge as needed  - Refer to Case Management Department for coordinating discharge planning if the patient needs post-hospital services based on physician/advanced practitioner order or complex needs related to functional status, cognitive ability, or social support system  Outcome: Adequate for Discharge     Problem: Knowledge Deficit  Goal: Patient/family/caregiver demonstrates understanding of disease process, treatment plan, medications, and discharge instructions  Description  Complete learning assessment and assess knowledge base    Interventions:  - Provide teaching at level of understanding  - Provide teaching via preferred learning methods  Outcome: Adequate for Discharge     Problem: Prexisting or High Potential for Compromised Skin Integrity  Goal: Skin integrity is maintained or improved  Description  INTERVENTIONS:  - Identify patients at risk for skin breakdown  - Assess and monitor skin integrity  - Assess and monitor nutrition and hydration status  - Monitor labs   - Assess for incontinence   - Turn and reposition patient  - Assist with mobility/ambulation  - Relieve pressure over bony prominences  - Avoid friction and shearing  - Provide appropriate hygiene as needed including keeping skin clean and dry  - Evaluate need for skin moisturizer/barrier cream  - Collaborate with interdisciplinary team   - Patient/family teaching  - Consider wound care consult   Outcome: Adequate for Discharge

## 2019-09-24 NOTE — SOCIAL WORK
CM call to [facility]&B at the [facility] informing of d/c this day with DIL transporting after lunch  Ruested to fax DCI to [phone]- done  No further d/x needs identified at this time

## 2019-09-24 NOTE — ASSESSMENT & PLAN NOTE
Fever possibly viral in etiology  With no evidence of repeat febrile episodes while inpatient     - CXR & CT A/P: No source of infection  - No leukocytosis throughout her admission  - Blood cultures NGTD  - Patient asymptomatic with UA negative for UTI  - Procalcitonin negative

## 2019-09-24 NOTE — PLAN OF CARE
Problem: Potential for Falls  Goal: Patient will remain free of falls  Description  INTERVENTIONS:  - Assess patient frequently for physical needs  -  Identify cognitive and physical deficits and behaviors that affect risk of falls    -  [place] fall precautions as indicated by assessment   - Educate patient/family on patient safety including physical limitations  - Instruct patient to call for assistance with activity based on assessment  - Modify environment to reduce risk of injury  - Consider OT/PT consult to assist with strengthening/mobility  Outcome: Progressing     Problem: RESPIRATORY - ADULT  Goal: Achieves optimal ventilation and oxygenation  Description  INTERVENTIONS:  - Assess for changes in respiratory status  - Assess for changes in mentation and behavior  - Position to facilitate oxygenation and minimize respiratory effort  - Oxygen administered by appropriate delivery if ordered  - Initiate smoking cessation education as indicated  - Encourage broncho-pulmonary hygiene including cough, deep breathe, Incentive Spirometry  - Assess the need for suctioning and aspirate as needed  - Assess and instruct to report SOB or any respiratory difficulty  - Respiratory Therapy support as indicated  Outcome: Progressing     Problem: METABOLIC, FLUID AND ELECTROLYTES - ADULT  Goal: Electrolytes maintained within normal limits  Description  INTERVENTIONS:  - Monitor labs and assess patient for signs and symptoms of electrolyte imbalances  - Administer electrolyte replacement as ordered  - Monitor response to electrolyte replacements, including repeat lab results as appropriate  - Instruct patient on fluid and nutrition as appropriate  Outcome: Progressing  Goal: Fluid balance maintained  Description  INTERVENTIONS:  - Monitor labs   - Monitor I/O and WT  - Instruct patient on fluid and nutrition as appropriate  - Assess for signs & symptoms of volume excess or deficit  Outcome: Progressing  Goal: Glucose maintained within target range  Description  INTERVENTIONS:  - Monitor Blood Glucose as ordered  - Assess for signs and symptoms of hyperglycemia and hypoglycemia  - Administer ordered medications to maintain glucose within target range  - Assess nutritional intake and initiate nutrition service referral as needed  Outcome: Progressing     Problem: SKIN/TISSUE INTEGRITY - ADULT  Goal: Skin integrity remains intact  Description  INTERVENTIONS  - Identify patients at risk for skin breakdown  - Assess and monitor skin integrity  - Assess and monitor nutrition and hydration status  - Monitor labs (i e  albumin)  - Assess for incontinence   - Turn and reposition patient  - Assist with mobility/ambulation  - Relieve pressure over bony prominences  - Avoid friction and shearing  - Provide appropriate hygiene as needed including keeping skin clean and dry  - Evaluate need for skin moisturizer/barrier cream  - Collaborate with interdisciplinary team (i e  Nutrition, Rehabilitation, etc )   - Patient/family teaching  Outcome: Progressing  Goal: Incision(s), wounds(s) or drain site(s) healing without S/S of infection  Description  INTERVENTIONS  - Assess and document risk factors for skin impairment   - Assess and document dressing, incision, wound bed, drain sites and surrounding tissue  - Consider nutrition services referral as needed  - Oral mucous membranes remain intact  - Provide patient/ family education  Outcome: Progressing  Goal: Oral mucous membranes remain intact  Description  INTERVENTIONS  - Assess oral mucosa and hygiene practices  - Implement preventative oral hygiene regimen  - Implement oral medicated treatments as ordered  - Initiate Nutrition services referral as needed  Outcome: Progressing     Problem: HEMATOLOGIC - ADULT  Goal: Maintains hematologic stability  Description  INTERVENTIONS  - Assess for signs and symptoms of bleeding or hemorrhage  - Monitor labs  - Administer supportive blood products/factors as ordered and appropriate  Outcome: Progressing     Problem: MUSCULOSKELETAL - ADULT  Goal: Maintain or return mobility to safest level of function  Description  INTERVENTIONS:  - Assess patient's ability to carry out ADLs; assess patient's baseline for ADL function and identify physical deficits which impact ability to perform ADLs (bathing, care of mouth/teeth, toileting, grooming, dressing, etc )  - Assess/evaluate cause of self-care deficits   - Assess range of motion  - Assess patient's mobility  - Assess patient's need for assistive devices and provide as appropriate  - Encourage maximum independence but intervene and supervise when necessary  - Involve family in performance of ADLs  - Assess for home care needs following discharge   - Consider OT consult to assist with ADL evaluation and planning for discharge  - Provide patient education as appropriate  Outcome: Progressing  Goal: Maintain proper alignment of affected body part  Description  INTERVENTIONS:  - Support, maintain and protect limb and body alignment  - Provide patient/ family with appropriate education  Outcome: Progressing     Problem: PAIN - ADULT  Goal: Verbalizes/displays adequate comfort level or baseline comfort level  Description  Interventions:  - Encourage patient to monitor pain and request assistance  - Assess pain using appropriate pain scale  - Administer analgesics based on type and severity of pain and evaluate response  - Implement non-pharmacological measures as appropriate and evaluate response  - Consider cultural and social influences on pain and pain management  - Notify physician/advanced practitioner if interventions unsuccessful or patient reports new pain  Outcome: Progressing     Problem: INFECTION - ADULT  Goal: Absence or prevention of progression during hospitalization  Description  INTERVENTIONS:  - Assess and monitor for signs and symptoms of infection  - Monitor lab/diagnostic results  - Monitor all insertion sites, i e  indwelling lines, tubes, and drains  - Monitor endotracheal if appropriate and nasal secretions for changes in amount and color  - [place] appropriate cooling/warming therapies per order  - Administer medications as ordered  - Instruct and encourage patient and family to use good hand hygiene technique  - Identify and instruct in appropriate isolation precautions for identified infection/condition  Outcome: Progressing  Goal: Absence of fever/infection during neutropenic period  Description  INTERVENTIONS:  - Monitor WBC    Outcome: Progressing     Problem: SAFETY ADULT  Goal: Maintain or return to baseline ADL function  Description  INTERVENTIONS:  -  Assess patient's ability to carry out ADLs; assess patient's baseline for ADL function and identify physical deficits which impact ability to perform ADLs (bathing, care of mouth/teeth, toileting, grooming, dressing, etc )  - Assess/evaluate cause of self-care deficits   - Assess range of motion  - Assess patient's mobility; develop plan if impaired  - Assess patient's need for assistive devices and provide as appropriate  - Encourage maximum independence but intervene and supervise when necessary  - Involve family in performance of ADLs  - Assess for home care needs following discharge   - Consider OT consult to assist with ADL evaluation and planning for discharge  - Provide patient education as appropriate  Outcome: Progressing  Goal: Maintain or return mobility status to optimal level  Description  INTERVENTIONS:  - Assess patient's baseline mobility status (ambulation, transfers, stairs, etc )    - Identify cognitive and physical deficits and behaviors that affect mobility  - Identify mobility aids required to assist with transfers and/or ambulation (gait belt, sit-to-stand, lift, walker, cane, etc )  - [place] fall precautions as indicated by assessment  - Record patient progress and toleration of activity level on Mobility SBAR; progress patient to next Phase/Stage  - Instruct patient to call for assistance with activity based on assessment  - Consider rehabilitation consult to assist with strengthening/weightbearing, etc   Outcome: Progressing     Problem: DISCHARGE PLANNING  Goal: Discharge to home or other facility with appropriate resources  Description  INTERVENTIONS:  - Identify barriers to discharge w/patient and caregiver  - Arrange for needed discharge resources and transportation as appropriate  - Identify discharge learning needs (meds, wound care, etc )  - Arrange for interpretive services to assist at discharge as needed  - Refer to Case Management Department for coordinating discharge planning if the patient needs post-hospital services based on physician/advanced practitioner order or complex needs related to functional status, cognitive ability, or social support system  Outcome: Progressing     Problem: Knowledge Deficit  Goal: Patient/family/caregiver demonstrates understanding of disease process, treatment plan, medications, and discharge instructions  Description  Complete learning assessment and assess knowledge base    Interventions:  - Provide teaching at level of understanding  - Provide teaching via preferred learning methods  Outcome: Progressing     Problem: Prexisting or High Potential for Compromised Skin Integrity  Goal: Skin integrity is maintained or improved  Description  INTERVENTIONS:  - Identify patients at risk for skin breakdown  - Assess and monitor skin integrity  - Assess and monitor nutrition and hydration status  - Monitor labs   - Assess for incontinence   - Turn and reposition patient  - Assist with mobility/ambulation  - Relieve pressure over bony prominences  - Avoid friction and shearing  - Provide appropriate hygiene as needed including keeping skin clean and dry  - Evaluate need for skin moisturizer/barrier cream  - Collaborate with interdisciplinary team   - Patient/family teaching  - Consider wound care consult   Outcome: Progressing

## 2019-09-24 NOTE — DISCHARGE SUMMARY
Discharge- Etelvina Coujaylan 8/29/1926, 80 y o  female MRN: [id number]    Unit/Bed#: [facility] [address] 206-02 Encounter: [id number]    Primary Care Provider: Woo Garcia DO   Date and time admitted to hospital: 9/22/2019  7:20 PM        * Fever  Assessment & Plan  Fever possibly viral in etiology  With no evidence of repeat febrile episodes while inpatient  - CXR & CT A/P: No source of infection  - No leukocytosis throughout her admission  - Blood cultures NGTD  - Patient asymptomatic with UA negative for UTI  - Procalcitonin negative    Essential hypertension  Assessment & Plan  - Controlled  - Continue home medications on discharge    CKD (chronic kidney disease) stage 2, GFR 60-89 ml/min  Assessment & Plan  - Currently at baseline  - Would avoid nephrotoxins and hypotension    Dementia  Assessment & Plan  Oriented to self  Follows command  [facility]    Hypokalemia  Assessment & Plan  Potassium chloride supplementation      Discharging Physician / Practitioner: Tigist Jamison MD  PCP: Woo Garcia DO  Admission Date:   Admission Orders (From admission, onward)     Ordered        09/22/19 2341  Inpatient Admission  Once                   Discharge Date: 09/24/19    Resolved Problems  Date Reviewed: 9/24/2019          Resolved    Hypercalcemia 9/24/2019     Resolved by  Tigist Jamison MD          Consultations During Hospital Stay:  · None    Procedures Performed:   · CXR & CT A/P    Significant Findings / Test Results:   · CXR Chest:    Prominent, tortuous thoracic aorta for which aneurysm cannot be excluded  CT imaging could be obtained if relevant      Skinfold right midlung with minimally prominent adjacent bronchovascular markings, grossly stable  This could also be further defined with CT imaging      Otherwise, no acute interval change      · CT A/P    1  No imaging explanation for fever      2  Cholelithiasis without evidence of cholecystitis      3    Diffusely ectatic visualized aorta with an infrarenal abdominal aortic aneurysm (3 7 x 3 6 cm)  Annual sonographic surveillance is recommended, if clinically relevant  Incidental Findings:   · As written above, infrarenal abdominal aortic aneurysm    Test Results Pending at Discharge (will require follow up): · None     Outpatient Tests Requested:  · BMP for potassium    Complications:  None    Reason for Admission: Fever    Hospital Course:     Ledy Green is a 80 y o  female patient who originally presented to the hospital on 9/22/2019 due to Fever  This patient has dementia at baseline  History of the from chart review  Was brought in because of a fever spike  On extensive infectious workup was performed  Chest x-ray was negative  CT scan of the abdomen and pelvis was largely unremarkable, except for an infrarenal abdominal aortic aneurysm  Given her age, would defer to her primary care if surveillance is still recommended  She received cefepime in the ED  It was discontinued since then, since no source was found  She had no fever repeat febrile episodes during this admission  Her procalcitonin was negative  As a result, there is no indication to start her on antibiotics at this time since starting broad spectrum antibiotics with no known source may even be detrimental to her health (C  Diff, resistance, etc)  Blood cultures NGTD as of 9/24/2019  In addition, she came in hypercalcemic and dehydrated  She responded well with IVF and hypercalcemia since then has normalized  On the day of discharge, I spoke to Torres Sorto (Emergency contact, daughter-in-law) and discussed to her the decision making process as to why no antibiotics are needed at this time  I would recommend close follow-up with her outside PCP and consider bringing her back to the hospital should she develop chest pain, shortness of breath, fever or chills or any other concerning symptoms      The patient, initially admitted to the hospital as inpatient, was discharged earlier than expected given the following: No recurrence of fever  Please see above list of diagnoses and related plan for additional information  Condition at Discharge: fair     Discharge Day Visit / Exam:     Subjective:  Patient seen and examined at bedside  No acute events or complaints overnight  She she is still at baseline  No febrile episodes overnight  I would prepare her for discharge today and recommend close follow-up with outside PCP  Vitals: Blood Pressure: (!) 136/101 (09/24/19 0735)  Pulse: 64 (09/24/19 0735)  Temperature: 98 9 °F (37 2 °C) (09/24/19 0735)  Temp Source: Temporal (09/22/19 2207)  Respirations: 18 (09/24/19 0735)  Weight - Scale: 58 3 kg (128 lb 8 5 oz) (09/22/19 1924)  SpO2: 96 % (09/24/19 0735)    Exam:   Physical Exam   Constitutional:   Elderly lady, not in respiratory distress   HENT:   Head: Normocephalic and atraumatic  Eyes: Pupils are equal, round, and reactive to light  Conjunctivae and EOM are normal  No scleral icterus  Neck: Normal range of motion  No JVD present  Cardiovascular: Normal rate, regular rhythm, normal heart sounds and intact distal pulses  Exam reveals no gallop and no friction rub  No murmur heard  Pulmonary/Chest: Effort normal and breath sounds normal    Abdominal: Soft  Bowel sounds are normal  She exhibits no distension  There is no tenderness  There is no rebound and no guarding  Musculoskeletal: Normal range of motion  She exhibits no edema or tenderness  Neurological: She is alert  Skin: Skin is warm and dry  Psychiatric: She has a normal mood and affect  Discussion with Family: Tiburcio Majano [phone]    Discharge instructions/Information to patient and family:   See after visit summary for information provided to patient and family  Provisions for Follow-Up Care:  See after visit summary for information related to follow-up care and any pertinent home health orders  Disposition:     [address] [facility]    For Discharges to [facility]:   · Not Applicable to this Patient - Not Applicable to this Patient    Planned Readmission: No     Discharge Statement:  I spent 35 minutes discharging the patient  This time was spent on the day of discharge  I had direct contact with the patient on the day of discharge  Greater than 50% of the total time was spent examining patient, answering all patient questions, arranging and discussing plan of care with patient as well as directly providing post-discharge instructions  Additional time then spent on discharge activities  Discharge Medications:  See after visit summary for reconciled discharge medications provided to patient and family        ** Please Note: This note has been constructed using a voice recognition system **

## 2019-09-27 LAB
BACTERIA BLD CULT: NORMAL
BACTERIA BLD CULT: NORMAL

## 2019-12-01 ENCOUNTER — HOSPITAL ENCOUNTER (EMERGENCY)
Facility: HOSPITAL | Age: 84
Discharge: HOME | End: 2019-12-02
Attending: EMERGENCY MEDICINE | Admitting: EMERGENCY MEDICINE
Payer: MEDICARE

## 2019-12-01 ENCOUNTER — APPOINTMENT (EMERGENCY)
Dept: CT IMAGING | Facility: HOSPITAL | Age: 84
End: 2019-12-01
Payer: MEDICARE

## 2019-12-01 DIAGNOSIS — S80.211A ABRASION OF RIGHT KNEE, INITIAL ENCOUNTER: ICD-10-CM

## 2019-12-01 DIAGNOSIS — S01.311A LACERATION OF RIGHT EXTERNAL EAR, INITIAL ENCOUNTER: Primary | ICD-10-CM
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
## 2019-12-01 PROCEDURE — 99284 EMERGENCY DEPT VISIT MOD MDM: CPT | Performed by: EMERGENCY MEDICINE

## 2019-12-01 PROCEDURE — G0168 WOUND CLOSURE BY ADHESIVE: HCPCS | Performed by: EMERGENCY MEDICINE

## 2019-12-01 PROCEDURE — 72125 CT NECK SPINE W/O DYE: CPT

## 2019-12-01 PROCEDURE — 70450 CT HEAD/BRAIN W/O DYE: CPT

## 2019-12-01 PROCEDURE — 99284 EMERGENCY DEPT VISIT MOD MDM: CPT

## 2019-12-01 RX ORDER — IPRATROPIUM BROMIDE AND ALBUTEROL SULFATE 2.5; .5 MG/3ML; MG/3ML
3 SOLUTION RESPIRATORY (INHALATION) 4 TIMES DAILY
COMMUNITY
End: 2021-04-21

## 2019-12-02 VITALS
TEMPERATURE: 97.9 F | RESPIRATION RATE: 16 BRPM | OXYGEN SATURATION: 94 % | SYSTOLIC BLOOD PRESSURE: 139 MMHG | HEART RATE: 61 BPM | DIASTOLIC BLOOD PRESSURE: 88 MMHG

## 2019-12-02 NOTE — ED PROVIDER NOTES
History  Chief Complaint   Patient presents with    Ear Injury     Right ear laceration after unwitnessed fall at some time yesterday  Injury found today during bedtime cleanup  Pt is a 80year old female with a PMH of dementia presenting with traumatic fall  Pt had unwitnessed fall at nursing home, and was found to have right ear bleeding and bruising  She is otherwise asymptomatic  She has right anterior knee abrasion but full ROM and no pain  She has a small laceration of the inner cartilage of the right ear  Unknown LOC  ASA and Plavix  Prior to Admission Medications   Prescriptions Last Dose Informant Patient Reported? Taking?    Bisacodyl (DULCOLAX RE) Unknown at Unknown time  Yes No   Sig: Insert into the rectum   Cholecalciferol (VITAMIN D3) 1000 units CAPS  Self Yes Yes   Sig: Daily   Menthol-Methyl Salicylate (ALEXANDRO ARGUELLO GREASELESS) 10-15 % greaseless cream   No Yes   Sig: Applied to affected area QID as needed   Potassium 75 MG TABS  Self Yes Yes   Sig: potassium   acetaminophen (TYLENOL) 325 mg tablet   Yes Yes   Sig: Take 650 mg by mouth every 6 (six) hours as needed for mild pain   aspirin 81 MG tablet Unknown at Unknown time Self Yes No   Sig: Take 81 mg by mouth daily   atorvastatin (LIPITOR) 80 mg tablet   Yes Yes   Sig: Take 80 mg by mouth daily   calcium carbonate-vitamin D (OSCAL-D) 500 mg-200 units per tablet   Yes Yes   Sig: Take 1 tablet by mouth daily with breakfast   clopidogrel (PLAVIX) 75 mg tablet   Yes Yes   Sig: Take 75 mg by mouth daily   fluticasone (FLONASE) 50 mcg/act nasal spray   Yes Yes   Si spray into each nostril daily   ipratropium-albuterol (DUO-NEB) 0 5-2 5 mg/3 mL nebulizer solution   Yes Yes   Sig: Take 3 mL by nebulization 4 (four) times a day   levothyroxine 25 mcg tablet  Self Yes Yes   Sig: Take 25 mcg by mouth daily    loperamide (IMODIUM) 2 mg capsule Unknown at Unknown time  Yes No   Sig: Take 2 mg by mouth 4 (four) times a day as needed for diarrhea magnesium hydroxide (MILK OF MAGNESIA) 400 mg/5 mL oral suspension Unknown at Unknown time  Yes No   Sig: Take by mouth daily as needed for constipation   propranolol (INDERAL LA) 160 mg  Self Yes Yes   Sig: 160 mg daily     sertraline (ZOLOFT) 25 mg tablet   Yes Yes   Sig: Take 25 mg by mouth daily   traZODone (DESYREL) 50 mg tablet   Yes Yes   Sig: Take 50 mg by mouth daily at bedtime as needed for sleep   vitamin B-12 (VITAMIN B-12) 1,000 mcg tablet   Yes Yes   Sig: Take by mouth daily      Facility-Administered Medications: None       Past Medical History:   Diagnosis Date    Disease of thyroid gland     Hypertension        Past Surgical History:   Procedure Laterality Date    EYE SURGERY      NE RECONSTR TOTAL SHOULDER IMPLANT Left 12/10/2018    Procedure: LEFT REVERSE TOTAL SHOULDER ARTHROPLASTY;  Surgeon: Jesus Garrido MD;  [facility];  Service: Orthopedics       Family History   Problem Relation Age of Onset    No Known Problems Mother     No Known Problems Father      I have reviewed and agree with the history as documented  Social History     Tobacco Use    Smoking status: Never Smoker    Smokeless tobacco: Never Used   Substance Use Topics    Alcohol use: No    Drug use: No        Review of Systems   Unable to perform ROS: Dementia       Physical Exam  Physical Exam   Constitutional: She is oriented to person, place, and time  She appears well-developed and well-nourished  No distress  HENT:   Head: Normocephalic  Right Ear: External ear normal  No hemotympanum  Left Ear: External ear normal  No hemotympanum  Ears:    Nose: Nose normal    Mouth/Throat: Oropharynx is clear and moist  No oropharyngeal exudate  Eyes: Pupils are equal, round, and reactive to light  Conjunctivae and EOM are normal    Neck: Normal range of motion  Neck supple  Cardiovascular: Normal rate, regular rhythm, normal heart sounds and intact distal pulses     Pulses:       Radial pulses are 2+ on the right side, and 2+ on the left side  Dorsalis pedis pulses are 2+ on the right side, and 2+ on the left side  Pulmonary/Chest: Effort normal and breath sounds normal    Musculoskeletal: Normal range of motion  Right shoulder: Normal         Left shoulder: Normal         Right elbow: Normal        Left elbow: Normal         Right wrist: Normal         Left wrist: Normal         Right hip: Normal         Left hip: Normal         Right knee: Normal         Left knee: Normal         Right ankle: Normal         Left ankle: Normal         Legs:  Neurological: She is alert and oriented to person, place, and time  She has normal strength  No cranial nerve deficit or sensory deficit  She exhibits normal muscle tone  Coordination normal  GCS eye subscore is 4  GCS verbal subscore is 5  GCS motor subscore is 6  Skin: Skin is warm and dry  She is not diaphoretic  Vital Signs  ED Triage Vitals   Temperature Pulse Respirations Blood Pressure SpO2   12/01/19 2036 12/01/19 2035 12/01/19 2035 12/01/19 2035 12/01/19 2035   97 9 °F (36 6 °C) 60 20 154/90 96 %      Temp Source Heart Rate Source Patient Position - Orthostatic VS BP Location FiO2 (%)   12/01/19 2036 12/01/19 2035 12/01/19 2035 12/01/19 2035 --   Oral Monitor Lying Right arm       Pain Score       12/01/19 2035       No Pain           Vitals:    12/01/19 2035   BP: 154/90   Pulse: 60   Patient Position - Orthostatic VS: Lying         Visual Acuity      ED Medications  Medications - No data to display    Diagnostic Studies  Results Reviewed     None                 CT head without contrast   Final Result by Shannan Kulkarni MD (12/01 2200)      No acute intracranial abnormality  Workstation performed: [id number]         CT cervical spine without contrast   Final Result by Shannan Kulkarni MD (12/01 2205)      No cervical spine fracture or traumatic malalignment                     Workstation performed: [id number] Procedures  Laceration repair  Date/Time: 12/1/2019 10:14 PM  Performed by: Honey Salas PA-C  Authorized by: Honey Salas PA-C   Consent: Verbal consent obtained  Consent given by: patient  Patient identity confirmed: verbally with patient and arm band  Body area: head/neck  Location details: right ear  Laceration length: 1 cm  Foreign bodies: no foreign bodies  Tendon involvement: none  Nerve involvement: none  Vascular damage: no  Anesthesia: local infiltration    Sedation:  Patient sedated: no      Wound Dehiscence:  Superficial Wound Dehiscence: simple closure      Procedure Details:  Preparation: Patient was prepped and draped in the usual sterile fashion  Irrigation solution: saline  Irrigation method: tap  Amount of cleaning: standard  Debridement: none  Degree of undermining: none  Skin closure: glue  Technique: simple  Approximation: close  Approximation difficulty: simple  Patient tolerance: Patient tolerated the procedure well with no immediate complications             ED Course                               MDM    Disposition  Final diagnoses:   Laceration of right external ear, initial encounter   Fall, initial encounter   Abrasion of right knee, initial encounter     Time reflects when diagnosis was documented in both MDM as applicable and the Disposition within this note     Time User Action Codes Description Comment    12/1/2019 10:13 PM Yinka LUCAS Add [S01 311A] Laceration of right external ear, initial encounter     12/1/2019 10:13 PM Theresa Samuels Add Laicindi Canes  XXXA] Fall, initial encounter     12/1/2019 10:13 PM Theresa Samuels Add [S80 211A] Abrasion of right knee, initial encounter       ED Disposition     ED Disposition Condition Date/Time Comment    Discharge Good Sun Dec 1, 2019 10:13 PM Liseth Brown discharge to home/self care              Follow-up Information     Follow up With Specialties Details Why Contact Anju Frye DO  Schedule an appointment as soon as possible for a visit  As needed [address]  [phone]            Patient's Medications   Discharge Prescriptions    No medications on file     No discharge procedures on file      ED Provider  Electronically Signed by           Jah GRETEL  12/01/19 4979

## 2019-12-02 NOTE — ED NOTES
ELIZABETH contacted to work on arranging transport home        [address], RN  12/01/19 4740

## 2019-12-02 NOTE — DISCHARGE INSTRUCTIONS
Fall Prevention for Older Adults   WHAT YOU NEED TO KNOW:   As you age, your muscles weaken and your risk for falls increases  Your risk also increases if you take medicines that make you sleepy or dizzy  You may also be at risk if you have vision or joint problems, have low blood pressure, or are not active  DISCHARGE INSTRUCTIONS:   Call 911 or have someone else call if:   · You have fallen and are unconscious  · You have fallen and cannot move part of your body  Contact your healthcare provider if:   · You have fallen and have pain or a headache  · You have questions or concerns about your condition or care  Fall prevention tips:   · Stay active  Exercise can help strengthen your muscles and improve your balance  Your healthcare provider may recommend water aerobics, walking, or [place] [place]  He may also recommend physical therapy to improve your coordination  Never start an exercise program without asking your healthcare provider first     · Wear shoes that fit well and have soles that   Wear shoes both inside and outside  Use slippers with good   Avoid shoes with high heels  · Use assistive devices as directed  Your healthcare provider may suggest that you use a cane or walker to help you keep your balance  You may need to have grab bars put in your bathroom near the toilet or in the shower  · Stand or sit up slowly  This may help you keep your balance and prevent falls  · Wear a personal alarm  This is a device that allows you to call 911 if you need help  Ask for more information on personal alarms  · Manage your medical conditions  Keep all appointments with your healthcare providers  Visit your eye doctor as directed  Home safety tips:   · Add items to prevent falls in the bathroom  Put nonslip strips on your bath or shower floor to prevent you from slipping  Use a bath mat if you do not have carpet in the bathroom   This will prevent you from falling when you step out of the bath or shower  Use a shower seat so you do not need to stand while you shower  Sit on the toilet or a chair in your bathroom to dry yourself and put on clothing  This will prevent you from losing your balance from drying or dressing yourself while you are standing  · Keep paths clear  Remove books, shoes, and other objects from walkways and stairs  Place cords for telephones and lamps out of the way so that you do not need to walk over them  Tape them down if you cannot move them  Remove small rugs  If you cannot remove a rug, secure it with double-sided tape  This will prevent you from tripping  · Install bright lights in your home  Use night lights to help light paths to the bathroom or kitchen  Always turn on the light before you start walking  · Keep items you use often on shelves within reach  Do not use a step stool to help you reach an item  · Paint or place reflective tape on the edges of your stairs  This will help you see the stairs better  Follow up with your healthcare provider as directed:  Write down your questions so you remember to ask them during your visits  © 2017 [address] [employer] Information is for End User's use only and may not be sold, redistributed or otherwise used for commercial purposes  All illustrations and images included in CareNotes® are the copyrighted property of [employer]  or K[employer]ik Mason  The above information is an  only  It is not intended as medical advice for individual conditions or treatments  Talk to your doctor, nurse or pharmacist before following any medical regimen to see if it is safe and effective for you

## 2019-12-02 NOTE — ED NOTES
Patient updated on status of transport  Verbalizes understanding  Offered blankets and nourishment to which patient reports she is okay   Lays down to rest       Anna Yin RN  12/02/19 4602

## 2021-01-26 ENCOUNTER — NURSING HOME VISIT (OUTPATIENT)
Dept: GERIATRICS | Facility: OTHER | Age: 86
End: 2021-01-26
Payer: MEDICARE

## 2021-01-26 DIAGNOSIS — F02.80 LATE ONSET ALZHEIMER'S DISEASE WITHOUT BEHAVIORAL DISTURBANCE (HCC): ICD-10-CM

## 2021-01-26 DIAGNOSIS — G30.1 LATE ONSET ALZHEIMER'S DISEASE WITHOUT BEHAVIORAL DISTURBANCE (HCC): ICD-10-CM

## 2021-01-26 DIAGNOSIS — F33.2 SEVERE EPISODE OF RECURRENT MAJOR DEPRESSIVE DISORDER, WITHOUT PSYCHOTIC FEATURES (HCC): Primary | ICD-10-CM

## 2021-01-26 PROCEDURE — 99326 PR DOMICIL/REST HOME NEW PT HI-MOD SEVER 45 MINUTES: CPT | Performed by: NURSE PRACTITIONER

## 2021-01-27 PROBLEM — F33.2 SEVERE EPISODE OF RECURRENT MAJOR DEPRESSIVE DISORDER, WITHOUT PSYCHOTIC FEATURES (HCC): Status: ACTIVE | Noted: 2021-01-27

## 2021-01-28 NOTE — PROGRESS NOTES
[address]   [address]  Shabnam Packer [address]  POS: 13: [address], [facility] [employer] [facility] [address] [place] [address] Drive     NAME: Chandrakant Strange  AGE: 80 y o  SEX: female [phone]    DATE OF ENCOUNTER: 1/26/2021    Code status:  DNR    Assessment and Plan      Diagnosis ICD-10-CM Associated Orders   1  Severe episode of recurrent major depressive disorder, without psychotic features ([facility] [address] )  F33 2    2  Late onset Alzheimer's disease without behavioral disturbance ([facility])  G30 1     F02 80        All medications and routine orders were reviewed and updated as needed  Evaluation of Psychotropic Drugs for possible gradual dose reductions    Psychotropic medications have been reviewed  Patient continues with symptoms of depression and mild paranoia as noted below  Any dose reductions at this time would be clinically contraindicated, as it would be likely to cause worsening of symptoms  Plan discussed with: Nurse    Treatment Recommendations/Precautions:    Increase Zoloft 50 mg     Medication management every 1 month    Medications Risks/Benefits:      Risks, Benefits And Possible Side Effects Of Medications:    Risks, benefits, and possible side effects of medications explained to Yuan  She does not verbalize understanding of treatment at this time and will require further explanation  Controlled Medication Discussion:     Not applicable - controlled prescriptions are not prescribed by this practice    Chief Complaint     Seen for Psychiatric Consult  at Nursing Facility/Assisted Living    History of Present Illness     Yuan is a 79 yo female with history of dementia and depression with psychosis  She has been a resident of [facility] since January 2019  She has been on Zoloft 25 mg for several years for depressed mood  She has had episodes of increased paranoia/delusions that seem to wax and wane    Staff have noted some increase in depressed mood, apathy with poor motivation, poor concentration and having a difficult time following direction  She is more withdrawn  She is seen I her room, presents flat and depressed with irritable edge, negative, mild paranoia  Limited insight into her cognitive and physical limitations, states she does not believe she needs to be in an assisted living facility  Admits to depressed mood  Denies any active suicidal thoughts or passive death wish  Will trial slight increase of Zoloft to 50 mg at follow up  Depression  This is a chronic problem  The current episode started more than 1 year ago  The problem occurs daily  The problem has been gradually worsening  The treatment provided mild relief  She  reports fluctuating sleep pattern, fluctuating appetite, fluctuating energy levels    The following portions of the patient's history were reviewed and updated as appropriate: allergies, current medications, past family history, past medical history, past social history, past surgical history and problem list     Past Psychiatric History:     Past Inpatient Psychiatric Treatment:   No history of past inpatient psychiatric admissions  Past Outpatient Psychiatric Treatment:    No history of past outpatient psychiatric treatment  Past Suicide Attempts: no  Past Violent Behavior: no  Past Psychiatric Medication Trials: patient does not remember    Traumatic History:     Abuse: no history of physical or sexual abuse  Other Traumatic Events: none    HISTORY:  Past Medical History:   Diagnosis Date    Disease of thyroid gland     Hypertension      Family History   Problem Relation Age of Onset    No Known Problems Mother     No Known Problems Father      Social History     Socioeconomic History    Marital status:       Spouse name: Not on file    Number of children: Not on file    Years of education: Not on file    Highest education level: Not on file   Occupational History    Not on file   Social Needs  Financial resource strain: Not on file   Paul[employer] insecurity     Worry: Not on file     Inability: Not on file   [employer] needs     Medical: Not on file     Non-medical: Not on file   Tobacco Use    Smoking status: Never Smoker    Smokeless tobacco: Never Used   Substance and Sexual Activity    Alcohol use: No    Drug use: No    Sexual activity: Not on file   Lifestyle    Physical activity     Days per week: Not on file     Minutes per session: Not on file    Stress: Not on file   Relationships    Social connections     Talks on phone: Not on file     Gets together: Not on file     Attends Mu-ism service: Not on file     Active member of club or organization: Not on file     Attends meetings of clubs or organizations: Not on file     Relationship status: Not on file    Intimate partner violence     Fear of current or ex partner: Not on file     Emotionally abused: Not on file     Physically abused: Not on file     Forced sexual activity: Not on file   Other Topics Concern    Not on file   Social History Narrative    Not on file       Allergies:  No Known Allergies    Review of Systems     Review of Systems   Psychiatric/Behavioral: Positive for confusion, decreased concentration, depression and dysphoric mood  All other systems reviewed and are negative        Medications and orders       Current Outpatient Medications:     acetaminophen (TYLENOL) 325 mg tablet, Take 650 mg by mouth every 6 (six) hours as needed for mild pain, Disp: , Rfl:     aspirin 81 MG tablet, Take 81 mg by mouth daily, Disp: , Rfl:     atorvastatin (LIPITOR) 80 mg tablet, Take 80 mg by mouth daily, Disp: , Rfl:     Bisacodyl (DULCOLAX RE), Insert into the rectum, Disp: , Rfl:     calcium carbonate-vitamin D (OSCAL-D) 500 mg-200 units per tablet, Take 1 tablet by mouth daily with breakfast, Disp: , Rfl:     Cholecalciferol (VITAMIN D3) 1000 units CAPS, Daily, Disp: , Rfl:     clopidogrel (PLAVIX) 75 mg tablet, Take 75 mg by mouth daily, Disp: , Rfl:     fluticasone (FLONASE) 50 mcg/act nasal spray, 1 spray into each nostril daily, Disp: , Rfl:     ipratropium-albuterol (DUO-NEB) 0 5-2 5 mg/3 mL nebulizer solution, Take 3 mL by nebulization 4 (four) times a day, Disp: , Rfl:     levothyroxine 25 mcg tablet, Take 25 mcg by mouth daily , Disp: , Rfl:     loperamide (IMODIUM) 2 mg capsule, Take 2 mg by mouth 4 (four) times a day as needed for diarrhea, Disp: , Rfl:     magnesium hydroxide (MILK OF MAGNESIA) 400 mg/5 mL oral suspension, Take by mouth daily as needed for constipation, Disp: , Rfl:     Menthol-Methyl Salicylate (ALEXANDRO ARGUELLO GREASELESS) 10-15 % greaseless cream, Applied to affected area QID as needed, Disp: 30 g, Rfl: 0    Potassium 75 MG TABS, potassium, Disp: , Rfl:     propranolol (INDERAL LA) 160 mg, 160 mg daily  , Disp: , Rfl:     sertraline (ZOLOFT) 25 mg tablet, Take 25 mg by mouth daily, Disp: , Rfl:     traZODone (DESYREL) 50 mg tablet, Take 50 mg by mouth daily at bedtime as needed for sleep, Disp: , Rfl:     vitamin B-12 (VITAMIN B-12) 1,000 mcg tablet, Take by mouth daily, Disp: , Rfl:       Objective     Mental Status Evaluation:    Appearance age appropriate, casually dressed   Behavior cooperative, guarded   Speech slow, soft   Mood depressed   Affect blunted   Thought Processes decreased rate of thoughts   Associations concrete associations   Thought Content no overt delusions, mild paranoia   Perceptual Disturbances: no auditory hallucinations, no visual hallucinations   Abnormal Thoughts  Risk Potential Suicidal ideation - None  Homicidal ideation - None  Potential for aggression - No   Orientation oriented to person and place   Memory recent memory impaired   Consciousness alert and awake   Attention Span Concentration Span poor attention span  poor concentration   Intellect appears to be of average intelligence   Insight poor   Judgement poor   Muscle Strength and  Gait uses wheelchair Motor Activity no abnormal movements   Language difficulty naming common objects, difficulty repeating a phrase, difficulty writing a sentence   Fund of Knowledge adequate fund of knowledge regarding vocabulary   impaired knowledge of current events  impaired fund of knowledge regarding past history   Pain none   Pain Scale 0       Physical Exam  Vitals signs and nursing note reviewed  Psychiatric:         Mood and Affect: Mood is depressed  Affect is blunt  Speech: Speech is delayed  Behavior: Behavior is slowed and withdrawn  Thought Content: Thought content is paranoid  Cognition and Memory: Cognition is impaired  Judgment: Judgment is inappropriate  Pertinent Laboratory/Diagnostic Studies:   I have personally reviewed pertinent lab results  Counseling / Coordination of Care  Total floor / unit time spent today 45 minutes  Greater than 50% of total time was spent with the patient and / or family counseling and / or coordination of care       JOSH Vargas 1/26/2021

## 2021-02-12 ENCOUNTER — APPOINTMENT (EMERGENCY)
Dept: CT IMAGING | Facility: HOSPITAL | Age: 86
End: 2021-02-12
Payer: MEDICARE

## 2021-02-12 ENCOUNTER — HOSPITAL ENCOUNTER (EMERGENCY)
Facility: HOSPITAL | Age: 86
Discharge: HOME | End: 2021-02-13
Attending: EMERGENCY MEDICINE | Admitting: EMERGENCY MEDICINE
Payer: MEDICARE

## 2021-02-12 VITALS
OXYGEN SATURATION: 98 % | WEIGHT: 132.5 LBS | TEMPERATURE: 97.9 F | BODY MASS INDEX: 25.88 KG/M2 | HEART RATE: 62 BPM | DIASTOLIC BLOOD PRESSURE: 85 MMHG | SYSTOLIC BLOOD PRESSURE: 153 MMHG | RESPIRATION RATE: 16 BRPM

## 2021-02-12 DIAGNOSIS — S09.90XA HEAD INJURY: ICD-10-CM
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
## 2021-02-12 PROCEDURE — 90471 IMMUNIZATION ADMIN: CPT

## 2021-02-12 PROCEDURE — 90715 TDAP VACCINE 7 YRS/> IM: CPT | Performed by: EMERGENCY MEDICINE

## 2021-02-12 PROCEDURE — 99282 EMERGENCY DEPT VISIT SF MDM: CPT | Performed by: EMERGENCY MEDICINE

## 2021-02-12 PROCEDURE — 99284 EMERGENCY DEPT VISIT MOD MDM: CPT

## 2021-02-12 PROCEDURE — 70450 CT HEAD/BRAIN W/O DYE: CPT

## 2021-02-12 PROCEDURE — 72125 CT NECK SPINE W/O DYE: CPT

## 2021-02-12 RX ADMIN — TETANUS TOXOID, REDUCED DIPHTHERIA TOXOID AND ACELLULAR PERTUSSIS VACCINE, ADSORBED 0.5 ML: 5; 2.5; 8; 8; 2.5 SUSPENSION INTRAMUSCULAR at 22:30

## 2021-02-13 NOTE — ED PROVIDER NOTES
History  Chief Complaint   Patient presents with    Fall     EMS reports pt was getting out of shower with staff and fell  -LOC  80year-old nursing home resident presents for evaluation after a mechanical fall  The patient states that she was getting out of the shower with staff when she slipped and fell  She did strike her head on and is complaining of some bleeding from her left parietal scalp as well as a mild headache  She denies any loss of consciousness  She does have some mild neck pain but denies any neurologic changes such as localized numbness, weakness, tingling  She denies trauma to other extremities  She denies any nausea or vomiting  She takes Plavix daily, no anticoagulating medications  Patient states that the fall was purely mechanical   She had no preceding chest pain, shortness breath, or syncopal/presyncopal symptoms  History provided by:  Patient   used: No    Head Injury w/unknown LOC  Location:  L parietal  Pain details:     Quality:  Aching    Severity:  Mild    Timing:  Constant    Progression:  Improving  Chronicity:  New  Relieved by:  Nothing  Worsened by:  Pressure  Ineffective treatments:  None tried  Associated symptoms: headache    Associated symptoms: no nausea, no numbness, no seizures and no vomiting        Prior to Admission Medications   Prescriptions Last Dose Informant Patient Reported? Taking?    Bisacodyl (DULCOLAX RE)   Yes No   Sig: Insert into the rectum   Cholecalciferol (VITAMIN D3) 1000 units CAPS  Self Yes No   Sig: Daily   Menthol-Methyl Salicylate (ALEXANDRO ARGUELLO GREASELESS) 10-15 % greaseless cream   No No   Sig: Applied to affected area QID as needed   Potassium 75 MG TABS  Self Yes Yes   Sig: potassium   acetaminophen (TYLENOL) 325 mg tablet   Yes Yes   Sig: Take 650 mg by mouth every 6 (six) hours as needed for mild pain   aspirin 81 MG tablet  Self Yes No   Sig: Take 81 mg by mouth daily   atorvastatin (LIPITOR) 80 mg tablet   Yes Yes   Sig: Take 80 mg by mouth daily   calcium carbonate-vitamin D (OSCAL-D) 500 mg-200 units per tablet   Yes Yes   Sig: Take 1 tablet by mouth daily with breakfast   clopidogrel (PLAVIX) 75 mg tablet   Yes Yes   Sig: Take 75 mg by mouth daily   fluticasone (FLONASE) 50 mcg/act nasal spray   Yes Yes   Si spray into each nostril daily   ipratropium-albuterol (DUO-NEB) 0 5-2 5 mg/3 mL nebulizer solution   Yes No   Sig: Take 3 mL by nebulization 4 (four) times a day   levothyroxine 25 mcg tablet  Self Yes Yes   Sig: Take 25 mcg by mouth daily    loperamide (IMODIUM) 2 mg capsule   Yes No   Sig: Take 2 mg by mouth 4 (four) times a day as needed for diarrhea   magnesium hydroxide (MILK OF MAGNESIA) 400 mg/5 mL oral suspension   Yes No   Sig: Take by mouth daily as needed for constipation   propranolol (INDERAL LA) 160 mg  Self Yes Yes   Sig: 160 mg daily     sertraline (ZOLOFT) 25 mg tablet   Yes Yes   Sig: Take 25 mg by mouth daily   traZODone (DESYREL) 50 mg tablet   Yes Yes   Sig: Take 50 mg by mouth daily at bedtime as needed for sleep   vitamin B-12 (VITAMIN B-12) 1,000 mcg tablet   Yes Yes   Sig: Take by mouth daily      Facility-Administered Medications: None       Past Medical History:   Diagnosis Date    Disease of thyroid gland     Hypertension        Past Surgical History:   Procedure Laterality Date    EYE SURGERY      IA RECONSTR TOTAL SHOULDER IMPLANT Left 12/10/2018    Procedure: LEFT REVERSE TOTAL SHOULDER ARTHROPLASTY;  Surgeon: Prakash Argueta MD;  [facility]: [facility];  Service: Orthopedics       Family History   Problem Relation Age of Onset    No Known Problems Mother     No Known Problems Father      I have reviewed and agree with the history as documented      E-Cigarette/Vaping     E-Cigarette/Vaping Substances     Social History     Tobacco Use    Smoking status: Never Smoker    Smokeless tobacco: Never Used   Substance Use Topics    Alcohol use: No    Drug use: No       Review of Systems   Constitutional: Negative for chills and fever  HENT: Negative for ear pain and sore throat  Head injury   Eyes: Negative for pain and visual disturbance  Respiratory: Negative for cough and shortness of breath  Cardiovascular: Negative for chest pain and palpitations  Gastrointestinal: Negative for abdominal pain, nausea and vomiting  Genitourinary: Negative for dysuria and hematuria  Musculoskeletal: Negative for arthralgias and back pain  Skin: Negative for color change and rash  Neurological: Positive for headaches  Negative for seizures, syncope, weakness and numbness  All other systems reviewed and are negative  Physical Exam  Physical Exam  Vitals signs and nursing note reviewed  Constitutional:       General: She is not in acute distress  Appearance: She is well-developed  HENT:      Head: Normocephalic  Comments: There is a small left parietal hematoma with an overlying superficial abrasion  There is dried blood in the hair but no active bleeding noted  Head is otherwise atraumatic  Unremarkable oropharyngeal exam      Mouth/Throat:      Mouth: Mucous membranes are moist       Pharynx: Oropharynx is clear  Eyes:      Conjunctiva/sclera: Conjunctivae normal    Neck:      Musculoskeletal: Neck supple  Cardiovascular:      Rate and Rhythm: Normal rate and regular rhythm  Heart sounds: No murmur  Pulmonary:      Effort: Pulmonary effort is normal  No respiratory distress  Breath sounds: Normal breath sounds  Abdominal:      Palpations: Abdomen is soft  Tenderness: There is no abdominal tenderness  Musculoskeletal: Normal range of motion  General: No tenderness  Comments: Normal range of motion of all four extremities without swelling or tenderness  Skin:     General: Skin is warm and dry  Capillary Refill: Capillary refill takes less than 2 seconds  Neurological:      General: No focal deficit present  Mental Status: She is alert and oriented to person, place, and time  Sensory: No sensory deficit  Motor: No weakness  Comments: GCS is 15 and nonfocal   5/5 strength in all four extremities  Vital Signs  ED Triage Vitals   Temperature Pulse Respirations Blood Pressure SpO2   02/12/21 2035 02/12/21 2035 02/12/21 2035 02/12/21 2035 02/12/21 2035   97 9 °F (36 6 °C) 69 16 156/90 96 %      Temp src Heart Rate Source Patient Position - Orthostatic VS BP Location FiO2 (%)   -- 02/12/21 2232 02/12/21 2232 02/12/21 2232 --    Monitor Lying Left arm       Pain Score       02/12/21 2035       2           Vitals:    02/12/21 2035 02/12/21 2232 02/12/21 2245   BP: 156/90 153/85 153/85   Pulse: 69 65 62   Patient Position - Orthostatic VS:  Lying          Visual Acuity  Visual Acuity      Most Recent Value   L Pupil Size (mm)  3   R Pupil Size (mm)  3          ED Medications  Medications   tetanus-diphtheria-acellular pertussis (BOOSTRIX) IM injection 0 5 mL (0 5 mL Intramuscular Given 2/12/21 2230)       Diagnostic Studies  Results Reviewed     None                 CT head without contrast   Final Result by Cherry Mccracken MD (02/12 2224)      1  Left parietal soft tissue scalp hematoma  2   No intracranial hemorrhage or calvarial fracture  3   Stable left frontal 8mm meningioma  4   Near complete opacification of the paranasal sinuses with associated mucoperiosteal thickening compatible with chronic sinusitis  Workstation performed: [id number]         CT spine cervical without contrast   Final Result by Andrea Marino MD (02/12 2212)         1  No acute cervical spine fracture or traumatic malalignment  2   Incomplete healing of left clavicle fracture                     Workstation performed: [id number]                    Procedures  Procedures         ED Course                             SBIRT 22yo+      Most Recent Value   SBIRT (25 yo +)   In order to provide better care to our patients, we are screening all of our patients for alcohol and drug use  Would it be okay to ask you these screening questions? Yes Filed at: 02/12/2021 2055   Initial Alcohol Screen: US AUDIT-C    1  How often do you have a drink containing alcohol?  0 Filed at: 02/12/2021 2055   2  How many drinks containing alcohol do you have on a typical day you are drinking? 0 Filed at: 02/12/2021 2055   3a  Male UNDER 65: How often do you have five or more drinks on one occasion? 0 Filed at: 02/12/2021 2055   3b  FEMALE Any Age, or MALE 65+: How often do you have 4 or more drinks on one occassion? 0 Filed at: 02/12/2021 2055   Audit-C Score  0 Filed at: 02/12/2021 2055   NEFTALI: How many times in the past year have you    Used an illegal drug or used a prescription medication for non-medical reasons? Never Filed at: 02/12/2021 2055                    MDM  Number of Diagnoses or Management Options  Fall: new and requires workup  Head injury: new and requires workup  Diagnosis management comments: 17-year-old female presented for head injury after mechanical fall  She had no other complaints  CT imaging of the head and neck was negative for intracranial hemorrhage or acute fracture/dislocation  She did have a small abrasion/superficial laceration over left parietal hematoma  Bleeding is well controlled, no gaping of the wound, and did not require staples or suturing  Patient's tetanus was updated  She will be discharged back to her facility  Plan for follow-up with primary and return with any new or worsening symptoms         Amount and/or Complexity of Data Reviewed  Tests in the radiology section of CPT®: reviewed and ordered  Review and summarize past medical records: yes        Disposition  Final diagnoses:   Fall   Head injury     Time reflects when diagnosis was documented in both MDM as applicable and the Disposition within this note     Time User Action Codes Description Comment    2/12/2021 11:05 PM [facility] Add [E01  XXXA] Fall     2/12/2021 11:05 PM [facility] Add [P37 37TC] Head injury       ED Disposition     ED Disposition Condition Date/Time Comment    Discharge Stable Fri Feb 12, 2021 11:05 PM Warren Vincentjulio discharge to home/self care              Follow-up Information     Follow up With Specialties Details Why Contact Info    Rogelio Hassan, [address] [facility]  [phone]            Discharge Medication List as of 2/12/2021 11:06 PM      CONTINUE these medications which have NOT CHANGED    Details   acetaminophen (TYLENOL) 325 mg tablet Take 650 mg by mouth every 6 (six) hours as needed for mild pain, Historical Med      atorvastatin (LIPITOR) 80 mg tablet Take 80 mg by mouth daily, Historical Med      calcium carbonate-vitamin D (OSCAL-D) 500 mg-200 units per tablet Take 1 tablet by mouth daily with breakfast, Historical Med      clopidogrel (PLAVIX) 75 mg tablet Take 75 mg by mouth daily, Historical Med      fluticasone (FLONASE) 50 mcg/act nasal spray 1 spray into each nostril daily, Historical Med      levothyroxine 25 mcg tablet Take 25 mcg by mouth daily , Starting Sat 8/18/2018, Historical Med      Potassium 75 MG TABS potassium, Historical Med      propranolol (INDERAL LA) 160 mg 160 mg daily  , Starting Mon 11/26/2018, Historical Med      sertraline (ZOLOFT) 25 mg tablet Take 25 mg by mouth daily, Historical Med      traZODone (DESYREL) 50 mg tablet Take 50 mg by mouth daily at bedtime as needed for sleep, Historical Med      vitamin B-12 (VITAMIN B-12) 1,000 mcg tablet Take by mouth daily, Historical Med      aspirin 81 MG tablet Take 81 mg by mouth daily, Historical Med      Bisacodyl (DULCOLAX RE) Insert into the rectum, Historical Med      Cholecalciferol (VITAMIN D3) 1000 units CAPS Daily, Historical Med      ipratropium-albuterol (DUO-NEB) 0 5-2 5 mg/3 mL nebulizer solution Take 3 mL by nebulization 4 (four) times a day, Historical Med loperamide (IMODIUM) 2 mg capsule Take 2 mg by mouth 4 (four) times a day as needed for diarrhea, Historical Med      magnesium hydroxide (MILK OF MAGNESIA) 400 mg/5 mL oral suspension Take by mouth daily as needed for constipation, Historical Med      Menthol-Methyl Salicylate (ALEXANDRO ARGUELLO GREASELESS) 10-15 % greaseless cream Applied to affected area QID as needed, Print           No discharge procedures on file      PDMP Review     None          ED Provider  Electronically Signed by           Pamala Merlin, MD  03/01/21 [id number]

## 2021-04-21 ENCOUNTER — APPOINTMENT (EMERGENCY)
Dept: CT IMAGING | Facility: HOSPITAL | Age: 86
End: 2021-04-21
Payer: MEDICARE

## 2021-04-21 ENCOUNTER — APPOINTMENT (EMERGENCY)
Dept: RADIOLOGY | Facility: HOSPITAL | Age: 86
End: 2021-04-21
Payer: MEDICARE

## 2021-04-21 ENCOUNTER — HOSPITAL ENCOUNTER (EMERGENCY)
Facility: HOSPITAL | Age: 86
Discharge: HOME | End: 2021-04-22
Attending: EMERGENCY MEDICINE | Admitting: EMERGENCY MEDICINE
Payer: MEDICARE

## 2021-04-21 VITALS
RESPIRATION RATE: 16 BRPM | DIASTOLIC BLOOD PRESSURE: 94 MMHG | TEMPERATURE: 98.1 F | HEART RATE: 60 BPM | OXYGEN SATURATION: 97 % | SYSTOLIC BLOOD PRESSURE: 151 MMHG

## 2021-04-21 DIAGNOSIS — S20.229A BACK CONTUSION: ICD-10-CM

## 2021-04-21 DIAGNOSIS — F03.90 DEMENTIA (HCC): ICD-10-CM
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
## 2021-04-21 LAB
ANION GAP SERPL CALCULATED.3IONS-SCNC: 4 MMOL/L (ref 4–13)
ATRIAL RATE: 62 BPM
BASOPHILS # BLD AUTO: 0.02 THOUSANDS/ΜL (ref 0–0.1)
BASOPHILS NFR BLD AUTO: 0 % (ref 0–1)
BUN SERPL-MCNC: 18 MG/DL (ref 5–25)
CALCIUM SERPL-MCNC: 11 MG/DL (ref 8.3–10.1)
CHLORIDE SERPL-SCNC: 108 MMOL/L (ref 100–108)
CO2 SERPL-SCNC: 31 MMOL/L (ref 21–32)
CREAT SERPL-MCNC: 1.19 MG/DL (ref 0.6–1.3)
EOSINOPHIL # BLD AUTO: 0.06 THOUSAND/ΜL (ref 0–0.61)
EOSINOPHIL NFR BLD AUTO: 1 % (ref 0–6)
ERYTHROCYTE [DISTWIDTH] IN BLOOD BY AUTOMATED COUNT: 13.5 % (ref 11.6–15.1)
GFR SERPL CREATININE-BSD FRML MDRD: 39 ML/MIN/1.73SQ M
GLUCOSE SERPL-MCNC: 126 MG/DL (ref 65–140)
HCT VFR BLD AUTO: 41.9 % (ref 34.8–46.1)
HGB BLD-MCNC: 13.8 G/DL (ref 11.5–15.4)
IMM GRANULOCYTES # BLD AUTO: 0.04 THOUSAND/UL (ref 0–0.2)
IMM GRANULOCYTES NFR BLD AUTO: 1 % (ref 0–2)
LYMPHOCYTES # BLD AUTO: 1.5 THOUSANDS/ΜL (ref 0.6–4.47)
LYMPHOCYTES NFR BLD AUTO: 24 % (ref 14–44)
MCH RBC QN AUTO: 32.5 PG (ref 26.8–34.3)
MCHC RBC AUTO-ENTMCNC: 32.9 G/DL (ref 31.4–37.4)
MCV RBC AUTO: 99 FL (ref 82–98)
MONOCYTES # BLD AUTO: 0.38 THOUSAND/ΜL (ref 0.17–1.22)
MONOCYTES NFR BLD AUTO: 6 % (ref 4–12)
NEUTROPHILS # BLD AUTO: 4.35 THOUSANDS/ΜL (ref 1.85–7.62)
NEUTS SEG NFR BLD AUTO: 68 % (ref 43–75)
NRBC BLD AUTO-RTO: 0 /100 WBCS
P AXIS: 93 DEGREES
PLATELET # BLD AUTO: 95 THOUSANDS/UL (ref 149–390)
PMV BLD AUTO: 10 FL (ref 8.9–12.7)
POTASSIUM SERPL-SCNC: 4.1 MMOL/L (ref 3.5–5.3)
PR INTERVAL: 166 MS
QRS AXIS: -38 DEGREES
QRSD INTERVAL: 122 MS
QT INTERVAL: 402 MS
QTC INTERVAL: 408 MS
RBC # BLD AUTO: 4.25 MILLION/UL (ref 3.81–5.12)
SODIUM SERPL-SCNC: 143 MMOL/L (ref 136–145)
T WAVE AXIS: 103 DEGREES
VENTRICULAR RATE: 62 BPM
WBC # BLD AUTO: 6.35 THOUSAND/UL (ref 4.31–10.16)

## 2021-04-21 PROCEDURE — 72100 X-RAY EXAM L-S SPINE 2/3 VWS: CPT

## 2021-04-21 PROCEDURE — 99285 EMERGENCY DEPT VISIT HI MDM: CPT | Performed by: EMERGENCY MEDICINE

## 2021-04-21 PROCEDURE — 36415 COLL VENOUS BLD VENIPUNCTURE: CPT | Performed by: EMERGENCY MEDICINE

## 2021-04-21 PROCEDURE — 93005 ELECTROCARDIOGRAM TRACING: CPT

## 2021-04-21 PROCEDURE — 80048 BASIC METABOLIC PNL TOTAL CA: CPT | Performed by: EMERGENCY MEDICINE

## 2021-04-21 PROCEDURE — 85025 COMPLETE CBC W/AUTO DIFF WBC: CPT | Performed by: EMERGENCY MEDICINE

## 2021-04-21 PROCEDURE — 72125 CT NECK SPINE W/O DYE: CPT

## 2021-04-21 PROCEDURE — 93010 ELECTROCARDIOGRAM REPORT: CPT | Performed by: INTERNAL MEDICINE

## 2021-04-21 PROCEDURE — G1004 CDSM NDSC: HCPCS

## 2021-04-21 PROCEDURE — 99285 EMERGENCY DEPT VISIT HI MDM: CPT

## 2021-04-21 PROCEDURE — 70450 CT HEAD/BRAIN W/O DYE: CPT

## 2021-04-21 NOTE — ED PROVIDER NOTES
Emergency Department Note- Edna Hess 80 y o  female MRN: [id number]    Unit/Bed#: ED 32 Encounter: [id number]        History of Present Illness     Patient is a 70-year-old female [facility]  EMS indicates they were called because the patient reportedly fell and was complaining of back pain earlier  They did not provide additional information  I attempted to contact the facility after my initial assessment and the phone simply rang and no one picked up  I did speak with the patient's daughter-in-law, Gabriel Jorge, who is her power of , and currently lives in [place]  She indicated that the patient has a history of dementia is normally oriented to person only, is supposed to use a wheelchair but will frequently get up and trying to ambulate independently but is too weak to do this and will fall down  She says she was called by the facility today and told the patient had fallen but does not know additional details regarding the fall  On my assessment the patient says she feels okay right now, she says she has no pain anywhere, she denies headache, denies chest pain, denies back pain, shortness of breath, she does not remember falling, she does not know where she is, and is oriented to person only      REVIEW OF SYSTEMS    Unable to obtain additional review systems secondary to patient dementia    Historical Information   Past Medical History:   Diagnosis Date    Disease of thyroid gland     Hypertension      Past Surgical History:   Procedure Laterality Date    EYE SURGERY      MN RECONSTR TOTAL SHOULDER IMPLANT Left 12/10/2018    Procedure: LEFT REVERSE TOTAL SHOULDER ARTHROPLASTY;  Surgeon: Nate Mukherjee MD;  [facility];  Service: Orthopedics     Social History   Social History     Substance and Sexual Activity   Alcohol Use No     Social History     Substance and Sexual Activity   Drug Use No     Social History     Tobacco Use Smoking Status Never Smoker   Smokeless Tobacco Never Used     Family History:   Family History   Problem Relation Age of Onset    No Known Problems Mother     No Known Problems Father        MEDICATIONS:  No current facility-administered medications on file prior to encounter        Current Outpatient Medications on File Prior to Encounter   Medication Sig Dispense Refill    acetaminophen (TYLENOL) 325 mg tablet Take 650 mg by mouth every 6 (six) hours as needed for mild pain      atorvastatin (LIPITOR) 80 mg tablet Take 80 mg by mouth daily      calcium carbonate-vitamin D (OSCAL-D) 500 mg-200 units per tablet Take 1 tablet by mouth daily with breakfast      Cholecalciferol (VITAMIN D3) 1000 units CAPS Daily      clopidogrel (PLAVIX) 75 mg tablet Take 75 mg by mouth daily      fluticasone (FLONASE) 50 mcg/act nasal spray 1 spray into each nostril daily      levothyroxine 25 mcg tablet Take 25 mcg by mouth daily       Menthol-Methyl Salicylate (ALEXANDRO ARGUELLO GREASELESS) 10-15 % greaseless cream Applied to affected area QID as needed 30 g 0    Potassium 99 MG TABS Take 99 mg by mouth       propranolol (INDERAL LA) 160 mg 160 mg daily        sertraline (ZOLOFT) 25 mg tablet Take 50 mg by mouth daily       traZODone (DESYREL) 50 mg tablet Take 50 mg by mouth daily at bedtime as needed for sleep      vitamin B-12 (VITAMIN B-12) 1,000 mcg tablet Take by mouth daily      Bisacodyl (DULCOLAX RE) Insert into the rectum      [DISCONTINUED] aspirin 81 MG tablet Take 81 mg by mouth daily      [DISCONTINUED] ipratropium-albuterol (DUO-NEB) 0 5-2 5 mg/3 mL nebulizer solution Take 3 mL by nebulization 4 (four) times a day      [DISCONTINUED] loperamide (IMODIUM) 2 mg capsule Take 2 mg by mouth 4 (four) times a day as needed for diarrhea      [DISCONTINUED] magnesium hydroxide (MILK OF MAGNESIA) 400 mg/5 mL oral suspension Take by mouth daily as needed for constipation       ALLERGIES:  No Known Allergies    Vitals:    04/21/21 1851 04/21/21 1915 04/21/21 2030 04/21/21 2100   BP:  (!) 177/84 (!) 172/90 151/94   TempSrc: Oral      Pulse:  62 60 60   Resp:  15 16 16   Patient Position - Orthostatic VS:  Lying Lying Lying   Temp: 98 1 °F (36 7 °C)          PHYSICAL EXAM    General:  Patient is well-appearing  Head:  Atraumatic  Eyes:  Conjunctiva pink, Extraocular muscle intact, no periorbital ecchymosis, PERRL  ENT:  Mucous membranes are moist, no dental malocclusion, no craniofacial instability, no Velarde signs  Neck:  Supple, no tenderness or step-offs or deformities  Cardiac:  S1-S2, without murmurs, no chest wall tenderness  Lungs:  Clear to auscultation bilaterally  Abdomen:  Soft, nontender, normal bowel sounds, no CVA tenderness, no tympany, no rigidity, no guarding  Extremities:  No bony tenderness to the bilateral bilateral humeral heads, humerus, elbows, radius, ulna, hands, hips, femurs, knees, tibia, fibula, feet  No pain with passive range of motion at the bilateral shoulders, elbows, wrists, hips, knees, or ankles  Back: No midline cervical, thoracic, lumbar, sacral tenderness, deformities, or step-offs  Neurologic:  Awake, oriented to person only, no slurred speech, no facial droop, no focal weakness, strength is 4/5 in the bilateral arms and legs  Sensation is intact throughout the entire body, she does not seem to understand my commands to perform finger-to-nose testing or pronator drift    Skin:  Pink warm and dry  Psychiatric:  Alert, pleasant, cooperative      Labs Reviewed   BASIC METABOLIC PANEL - Abnormal       Result Value Ref Range Status    Sodium 143  136 - 145 mmol/L Final    Potassium 4 1  3 5 - 5 3 mmol/L Final    Chloride 108  100 - 108 mmol/L Final    CO2 31  21 - 32 mmol/L Final    ANION GAP 4  4 - 13 mmol/L Final    BUN 18  5 - 25 mg/dL Final    Creatinine 1 19  0 60 - 1 30 mg/dL Final    Comment: Standardized to IDMS reference method    Glucose 126  65 - 140 mg/dL Final Comment: If the patient is fasting, the ADA then defines impaired fasting glucose as > 100 mg/dL and diabetes as > or equal to 123 mg/dL  Specimen collection should occur prior to Sulfasalazine administration due to the potential for falsely depressed results  Specimen collection should occur prior to Sulfapyridine administration due to the potential for falsely elevated results      Calcium 11 0 (*) 8 3 - 10 1 mg/dL Final    eGFR 39  ml/min/1 73sq m Final    Narrative:     [place] guidelines for Chronic Kidney Disease (CKD):     Stage 1 with normal or high GFR (GFR > 90 mL/min/1 73 square meters)    Stage 2 Mild CKD (GFR = 60-89 mL/min/1 73 square meters)    Stage 3A Moderate CKD (GFR = 45-59 mL/min/1 73 square meters)    Stage 3B Moderate CKD (GFR = 30-44 mL/min/1 73 square meters)    Stage 4 Severe CKD (GFR = 15-29 mL/min/1 73 square meters)    Stage 5 End Stage CKD (GFR <15 mL/min/1 73 square meters)  Note: GFR calculation is accurate only with a steady state creatinine   CBC AND DIFFERENTIAL - Abnormal    WBC 6 35  4 31 - 10 16 Thousand/uL Final    RBC 4 25  3 81 - 5 12 Million/uL Final    Hemoglobin 13 8  11 5 - 15 4 g/dL Final    Hematocrit 41 9  34 8 - 46 1 % Final    MCV 99 (*) 82 - 98 fL Final    MCH 32 5  26 8 - 34 3 pg Final    MCHC 32 9  31 4 - 37 4 g/dL Final    RDW 13 5  11 6 - 15 1 % Final    MPV 10 0  8 9 - 12 7 fL Final    Platelets 95 (*) 905 - 390 Thousands/uL Final    nRBC 0  /100 WBCs Final    Neutrophils Relative 68  43 - 75 % Final    Immat GRANS % 1  0 - 2 % Final    Lymphocytes Relative 24  14 - 44 % Final    Monocytes Relative 6  4 - 12 % Final    Eosinophils Relative 1  0 - 6 % Final    Basophils Relative 0  0 - 1 % Final    Neutrophils Absolute 4 35  1 85 - 7 62 Thousands/µL Final    Immature Grans Absolute 0 04  0 00 - 0 20 Thousand/uL Final    Lymphocytes Absolute 1 50  0 60 - 4 47 Thousands/µL Final    Monocytes Absolute 0 38  0 17 - 1 22 Thousand/µL Final    Eosinophils Absolute 0 06  0 00 - 0 61 Thousand/µL Final    Basophils Absolute 0 02  0 00 - 0 10 Thousands/µL Final       Medications - No data to display    XR spine lumbar 2 or 3 views injury   ED Interpretation   Lumbar spine x-ray interpreted me shows scoliosis, significant degenerative changes but no acute fracture or spondylolisthesis, no old available for comparison      CT head wo contrast   Final Result      No acute intracranial abnormality  Microangiopathic changes  Chronic infarcts are noted as described  Extra-axial mass left frontal region stable from prior likely 7 mm meningioma  Chronic extensive paranasal sinus opacification  Workstation performed: [id number]         CT spine cervical wo contrast   Final Result      No cervical spine fracture or traumatic malalignment  Degenerative changes are again noted similar to the prior study  Workstation performed: [id number]             ED Course as of Apr 21 2220   Wed Apr 21, 2021   1838 [facility], no answer      8628 [facility], no answer      1910 EKG interpreted by me, sinus rhythm, rate of 62, left axis deviation, LVH by voltage, nonspecific interventricular conduction delay, no acute change from September 22, 2019 1925 [facility], no answer      2000 Patient with chronic thrombocytopenia      2018 On reassessment, there is no change with the above findings  2019 [facility], no answer      2042 Head and cervical spine CT unremarkable  2129 Spoke again with Freddie Diaz, she indicated that she would prefer her mother-in-law to go back to the facility tonight as she felt like she was getting cared for well there, but did agree to hospitalization if necessary given that I was unable to get a hold of anyone from the facility    She did however give me the practice administrator,Parminder's mobile phone number and asked that I call her to try and discuss the situation  I spoke with the practice administrator, who is a non practicing oncologist, she indicated that their power had been out for several hours which is why the phone service was down, but things were restored  patient was walking with a walker earlier today when she slipped and fell, did not hit her head or have loss of consciousness but did initially complain of some low back discomfort  The patient is on the last day of treatment with antibiotics for urinary tract infection, they have not noticed any complaints of dysuria or hematuria at the facility  The practice administrator indicates that while they are technically an assisted living facility per the website, they do have essentially skilled level nursing care on the 2nd floor, and that the patient gets checked on every 15-30 minutes and is well supervised  I discussed with the practice administrator about the workup that had been performed, and she did request that a lumbar spine x-ray be obtained given the patient's earlier complaints of low back pain  She asked that I contact her if the lumbar spine x-ray showed any acute pathology  The practice administrator indicated that she would contact the staff at her facility to ensure that they were ready for the patient when she returned back to the facility  I did speak with Rc davies again, and she was quite comfortable with the patient going [facility] to [facility]      [id number] On reassessment, there is no change with the above findings  Assessment/Plan     ED Medical Decision Making:    Patient is being returned to her facility, no acute problems from the fall  Time reflects when diagnosis was documented in both MDM as applicable and the Disposition within this note     Time User Action Codes Description Comment    4/21/2021 10:05 PM Prakash Shirley Add [T01  XXXA] Fall from standing     4/21/2021 10:05 PM Prakash Shirley Add [S20 229A] Back contusion     4/21/2021 10:05 PM Coreen Hawkins Add [F03 90] Dementia [facility])       ED Disposition     ED Disposition Condition Date/Time Comment    Discharge Stable Wed Apr 21, 2021 10:05 PM Edna Hess discharge to home/self care              Follow-up Information     Follow up With Specialties Details Why Contact Info    Romayne Crimes, DO  Schedule an appointment as soon as possible for a visit on 4/26/2021  [address]  [phone]            New Prescriptions    No medications on file            Amor Mitchell DO  04/21/21 0688

## 2021-04-21 NOTE — ED NOTES
Patient transported to [address] [place] [address] [address], RN  [facility]

## 2021-04-22 NOTE — ED NOTES
ELIZABETH called for transportation     [facility], [address] [facility]  04/21/21 8784

## 2021-04-22 NOTE — ED NOTES
RN changed and repositioned pt   Cream placed on pts buttocks      Damion Capone, [employer]  04/21/21 0732

## 2021-04-22 NOTE — DISCHARGE INSTRUCTIONS
Fall  DISCHARGE INSTRUCTIONS:   Return to the emergency department if:   You have new or worsening pain anywhere  You are unable to walk  You have new numbness or weakness anywhere  You are concerned about anything else

## 2021-10-13 ENCOUNTER — HOSPITAL ENCOUNTER (EMERGENCY)
Facility: HOSPITAL | Age: 86
Discharge: HOME | End: 2021-10-13
Attending: EMERGENCY MEDICINE
Payer: MEDICARE

## 2021-10-13 ENCOUNTER — APPOINTMENT (EMERGENCY)
Dept: CT IMAGING | Facility: HOSPITAL | Age: 86
End: 2021-10-13
Payer: MEDICARE

## 2021-10-13 VITALS
TEMPERATURE: 97.6 F | DIASTOLIC BLOOD PRESSURE: 67 MMHG | SYSTOLIC BLOOD PRESSURE: 108 MMHG | OXYGEN SATURATION: 96 % | HEART RATE: 60 BPM | RESPIRATION RATE: 15 BRPM

## 2021-10-13 DIAGNOSIS — S00.93XA CONTUSION OF HEAD: Primary | ICD-10-CM

## 2021-10-13 PROCEDURE — 99282 EMERGENCY DEPT VISIT SF MDM: CPT | Performed by: PHYSICIAN ASSISTANT

## 2021-10-13 PROCEDURE — 70450 CT HEAD/BRAIN W/O DYE: CPT

## 2021-10-13 PROCEDURE — 93005 ELECTROCARDIOGRAM TRACING: CPT

## 2021-10-13 PROCEDURE — 72125 CT NECK SPINE W/O DYE: CPT

## 2021-10-13 PROCEDURE — 99284 EMERGENCY DEPT VISIT MOD MDM: CPT

## 2021-10-14 LAB
ATRIAL RATE: 63 BPM
P AXIS: 67 DEGREES
PR INTERVAL: 176 MS
QRS AXIS: -59 DEGREES
QRSD INTERVAL: 140 MS
QT INTERVAL: 452 MS
QTC INTERVAL: 462 MS
T WAVE AXIS: 85 DEGREES
VENTRICULAR RATE: 63 BPM

## 2021-10-14 PROCEDURE — 93010 ELECTROCARDIOGRAM REPORT: CPT | Performed by: INTERNAL MEDICINE

## 2022-01-01 ENCOUNTER — APPOINTMENT (OUTPATIENT)
Dept: RADIOLOGY | Facility: HOSPITAL | Age: 87
DRG: 085 | End: 2022-01-01
Payer: MEDICARE

## 2022-01-01 ENCOUNTER — APPOINTMENT (INPATIENT)
Dept: RADIOLOGY | Facility: HOSPITAL | Age: 87
DRG: 085 | End: 2022-01-01
Payer: MEDICARE

## 2022-01-01 ENCOUNTER — HOME CARE VISIT (OUTPATIENT)
Dept: HOME HEALTH SERVICES | Facility: HOME HEALTHCARE | Age: 87
End: 2022-01-01

## 2022-01-01 ENCOUNTER — HOSPITAL ENCOUNTER (INPATIENT)
Facility: HOSPITAL | Age: 87
LOS: 8 days | DRG: 085 | End: 2022-09-16
Attending: STUDENT IN AN ORGANIZED HEALTH CARE EDUCATION/TRAINING PROGRAM | Admitting: SURGERY
Payer: MEDICARE

## 2022-01-01 VITALS
BODY MASS INDEX: 22.99 KG/M2 | TEMPERATURE: 99.4 F | RESPIRATION RATE: 18 BRPM | SYSTOLIC BLOOD PRESSURE: 151 MMHG | OXYGEN SATURATION: 96 % | DIASTOLIC BLOOD PRESSURE: 99 MMHG | WEIGHT: 117.73 LBS | HEART RATE: 100 BPM
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
## 2022-01-01 DIAGNOSIS — F02.80 LATE ONSET ALZHEIMER'S DEMENTIA WITHOUT BEHAVIORAL DISTURBANCE (HCC): ICD-10-CM

## 2022-01-01 DIAGNOSIS — M97.9XXA PERIPROSTHETIC FRACTURE OF SHOULDER: ICD-10-CM

## 2022-01-01 DIAGNOSIS — S06.5XAA SUBDURAL HEMATOMA, ACUTE: Primary | ICD-10-CM

## 2022-01-01 DIAGNOSIS — G30.1 LATE ONSET ALZHEIMER'S DEMENTIA WITHOUT BEHAVIORAL DISTURBANCE (HCC): ICD-10-CM

## 2022-01-01 DIAGNOSIS — S12.110A DENS FRACTURE (HCC): ICD-10-CM

## 2022-01-01 LAB
ANION GAP SERPL CALCULATED.3IONS-SCNC: 2 MMOL/L (ref 4–13)
ANION GAP SERPL CALCULATED.3IONS-SCNC: 4 MMOL/L (ref 4–13)
APTT PPP: 32 SECONDS (ref 23–37)
ATRIAL RATE: 0 BPM
ATRIAL RATE: 0 BPM
BASOPHILS # BLD AUTO: 0.02 THOUSANDS/ΜL (ref 0–0.1)
BASOPHILS # BLD AUTO: 0.02 THOUSANDS/ΜL (ref 0–0.1)
BASOPHILS NFR BLD AUTO: 0 % (ref 0–1)
BASOPHILS NFR BLD AUTO: 0 % (ref 0–1)
BUN SERPL-MCNC: 15 MG/DL (ref 5–25)
BUN SERPL-MCNC: 17 MG/DL (ref 5–25)
CALCIUM SERPL-MCNC: 10 MG/DL (ref 8.3–10.1)
CALCIUM SERPL-MCNC: 9.9 MG/DL (ref 8.3–10.1)
CFFMA (FUNCTIONAL FIBRINOGEN MAX AMPLITUDE): <4 MM (ref 15–32)
CHLORIDE SERPL-SCNC: 109 MMOL/L (ref 96–108)
CHLORIDE SERPL-SCNC: 111 MMOL/L (ref 96–108)
CKLY30: 0.5 % (ref 0–2.6)
CKR(REACTION TIME): 7.2 MIN (ref 4.6–9.1)
CO2 SERPL-SCNC: 25 MMOL/L (ref 21–32)
CO2 SERPL-SCNC: 26 MMOL/L (ref 21–32)
CREAT SERPL-MCNC: 1.07 MG/DL (ref 0.6–1.3)
CREAT SERPL-MCNC: 1.1 MG/DL (ref 0.6–1.3)
CRTMA(RAPIDTEG MAX AMPLITUDE): <40 MM (ref 52–70)
EOSINOPHIL # BLD AUTO: 0.05 THOUSAND/ΜL (ref 0–0.61)
EOSINOPHIL # BLD AUTO: 0.25 THOUSAND/ΜL (ref 0–0.61)
EOSINOPHIL NFR BLD AUTO: 1 % (ref 0–6)
EOSINOPHIL NFR BLD AUTO: 5 % (ref 0–6)
ERYTHROCYTE [DISTWIDTH] IN BLOOD BY AUTOMATED COUNT: 13.7 % (ref 11.6–15.1)
ERYTHROCYTE [DISTWIDTH] IN BLOOD BY AUTOMATED COUNT: 13.9 % (ref 11.6–15.1)
GFR SERPL CREATININE-BSD FRML MDRD: 42 ML/MIN/1.73SQ M
GFR SERPL CREATININE-BSD FRML MDRD: 43 ML/MIN/1.73SQ M
GLUCOSE SERPL-MCNC: 107 MG/DL (ref 65–140)
GLUCOSE SERPL-MCNC: 99 MG/DL (ref 65–140)
HCT VFR BLD AUTO: 31.8 % (ref 34.8–46.1)
HCT VFR BLD AUTO: 32.7 % (ref 34.8–46.1)
HGB BLD-MCNC: 10.5 G/DL (ref 11.5–15.4)
HGB BLD-MCNC: 10.6 G/DL (ref 11.5–15.4)
IMM GRANULOCYTES # BLD AUTO: 0.01 THOUSAND/UL (ref 0–0.2)
IMM GRANULOCYTES # BLD AUTO: 0.01 THOUSAND/UL (ref 0–0.2)
IMM GRANULOCYTES NFR BLD AUTO: 0 % (ref 0–2)
IMM GRANULOCYTES NFR BLD AUTO: 0 % (ref 0–2)
INR PPP: 1.72 (ref 0.84–1.19)
LYMPHOCYTES # BLD AUTO: 1.48 THOUSANDS/ΜL (ref 0.6–4.47)
LYMPHOCYTES # BLD AUTO: 1.61 THOUSANDS/ΜL (ref 0.6–4.47)
LYMPHOCYTES NFR BLD AUTO: 27 % (ref 14–44)
LYMPHOCYTES NFR BLD AUTO: 33 % (ref 14–44)
MAGNESIUM SERPL-MCNC: 1.9 MG/DL (ref 1.6–2.6)
MAGNESIUM SERPL-MCNC: 2.1 MG/DL (ref 1.6–2.6)
MCH RBC QN AUTO: 32.6 PG (ref 26.8–34.3)
MCH RBC QN AUTO: 33.7 PG (ref 26.8–34.3)
MCHC RBC AUTO-ENTMCNC: 32.1 G/DL (ref 31.4–37.4)
MCHC RBC AUTO-ENTMCNC: 33.3 G/DL (ref 31.4–37.4)
MCV RBC AUTO: 101 FL (ref 82–98)
MCV RBC AUTO: 102 FL (ref 82–98)
MONOCYTES # BLD AUTO: 0.54 THOUSAND/ΜL (ref 0.17–1.22)
MONOCYTES # BLD AUTO: 0.62 THOUSAND/ΜL (ref 0.17–1.22)
MONOCYTES NFR BLD AUTO: 11 % (ref 4–12)
MONOCYTES NFR BLD AUTO: 11 % (ref 4–12)
NEUTROPHILS # BLD AUTO: 2.69 THOUSANDS/ΜL (ref 1.85–7.62)
NEUTROPHILS # BLD AUTO: 3.04 THOUSANDS/ΜL (ref 1.85–7.62)
NEUTS SEG NFR BLD AUTO: 55 % (ref 43–75)
NEUTS SEG NFR BLD AUTO: 57 % (ref 43–75)
NRBC BLD AUTO-RTO: 0 /100 WBCS
NRBC BLD AUTO-RTO: 0 /100 WBCS
PHOSPHATE SERPL-MCNC: 2 MG/DL (ref 2.3–4.1)
PHOSPHATE SERPL-MCNC: 2.6 MG/DL (ref 2.3–4.1)
PLATELET # BLD AUTO: 67 THOUSANDS/UL (ref 149–390)
PLATELET # BLD AUTO: 70 THOUSANDS/UL (ref 149–390)
PMV BLD AUTO: 10.4 FL (ref 8.9–12.7)
PMV BLD AUTO: 9.8 FL (ref 8.9–12.7)
POTASSIUM SERPL-SCNC: 3.7 MMOL/L (ref 3.5–5.3)
POTASSIUM SERPL-SCNC: 4.3 MMOL/L (ref 3.5–5.3)
PROTHROMBIN TIME: 20.4 SECONDS (ref 11.6–14.5)
QRS AXIS: 0 DEGREES
QRS AXIS: 0 DEGREES
QRSD INTERVAL: 0 MS
QRSD INTERVAL: 0 MS
QT INTERVAL: 0 MS
QT INTERVAL: 0 MS
QTC INTERVAL: 0 MS
QTC INTERVAL: 0 MS
RBC # BLD AUTO: 3.15 MILLION/UL (ref 3.81–5.12)
RBC # BLD AUTO: 3.22 MILLION/UL (ref 3.81–5.12)
SODIUM SERPL-SCNC: 138 MMOL/L (ref 135–147)
SODIUM SERPL-SCNC: 139 MMOL/L (ref 135–147)
T WAVE AXIS: 0 DEGREES
T WAVE AXIS: 0 DEGREES
VENTRICULAR RATE: 0 BPM
VENTRICULAR RATE: 0 BPM
WBC # BLD AUTO: 4.92 THOUSAND/UL (ref 4.31–10.16)
WBC # BLD AUTO: 5.42 THOUSAND/UL (ref 4.31–10.16)

## 2022-01-01 PROCEDURE — 80048 BASIC METABOLIC PNL TOTAL CA: CPT

## 2022-01-01 PROCEDURE — 73030 X-RAY EXAM OF SHOULDER: CPT

## 2022-01-01 PROCEDURE — NC001 PR NO CHARGE: Performed by: ORTHOPAEDIC SURGERY

## 2022-01-01 PROCEDURE — 85397 CLOTTING FUNCT ACTIVITY: CPT

## 2022-01-01 PROCEDURE — 97163 PT EVAL HIGH COMPLEX 45 MIN: CPT

## 2022-01-01 PROCEDURE — 70496 CT ANGIOGRAPHY HEAD: CPT

## 2022-01-01 PROCEDURE — 85025 COMPLETE CBC W/AUTO DIFF WBC: CPT

## 2022-01-01 PROCEDURE — 85576 BLOOD PLATELET AGGREGATION: CPT

## 2022-01-01 PROCEDURE — 70450 CT HEAD/BRAIN W/O DYE: CPT

## 2022-01-01 PROCEDURE — 84100 ASSAY OF PHOSPHORUS: CPT

## 2022-01-01 PROCEDURE — 70498 CT ANGIOGRAPHY NECK: CPT

## 2022-01-01 PROCEDURE — 85384 FIBRINOGEN ACTIVITY: CPT

## 2022-01-01 PROCEDURE — G1004 CDSM NDSC: HCPCS

## 2022-01-01 PROCEDURE — 99232 SBSQ HOSP IP/OBS MODERATE 35: CPT | Performed by: SURGERY

## 2022-01-01 PROCEDURE — 92610 EVALUATE SWALLOWING FUNCTION: CPT

## 2022-01-01 PROCEDURE — 99285 EMERGENCY DEPT VISIT HI MDM: CPT

## 2022-01-01 PROCEDURE — 99222 1ST HOSP IP/OBS MODERATE 55: CPT | Performed by: ORTHOPAEDIC SURGERY

## 2022-01-01 PROCEDURE — 83735 ASSAY OF MAGNESIUM: CPT

## 2022-01-01 PROCEDURE — 85730 THROMBOPLASTIN TIME PARTIAL: CPT

## 2022-01-01 PROCEDURE — 85610 PROTHROMBIN TIME: CPT

## 2022-01-01 PROCEDURE — NC001 PR NO CHARGE: Performed by: SURGERY

## 2022-01-01 PROCEDURE — 97167 OT EVAL HIGH COMPLEX 60 MIN: CPT

## 2022-01-01 PROCEDURE — 99238 HOSP IP/OBS DSCHRG MGMT 30/<: CPT | Performed by: SURGERY

## 2022-01-01 PROCEDURE — 99223 1ST HOSP IP/OBS HIGH 75: CPT | Performed by: INTERNAL MEDICINE

## 2022-01-01 PROCEDURE — 93005 ELECTROCARDIOGRAM TRACING: CPT

## 2022-01-01 PROCEDURE — 99448 NTRPROF PH1/NTRNET/EHR 21-30: CPT | Performed by: NEUROLOGICAL SURGERY

## 2022-01-01 PROCEDURE — NC001 PR NO CHARGE: Performed by: NURSE PRACTITIONER

## 2022-01-01 PROCEDURE — 99232 SBSQ HOSP IP/OBS MODERATE 35: CPT | Performed by: EMERGENCY MEDICINE

## 2022-01-01 PROCEDURE — 85347 COAGULATION TIME ACTIVATED: CPT

## 2022-01-01 PROCEDURE — 99291 CRITICAL CARE FIRST HOUR: CPT | Performed by: SURGERY

## 2022-01-01 RX ORDER — SODIUM CHLORIDE, SODIUM LACTATE, POTASSIUM CHLORIDE, CALCIUM CHLORIDE 600; 310; 30; 20 MG/100ML; MG/100ML; MG/100ML; MG/100ML
100 INJECTION, SOLUTION INTRAVENOUS CONTINUOUS
Status: DISCONTINUED | OUTPATIENT
Start: 2022-01-01 | End: 2022-01-01

## 2022-01-01 RX ORDER — LANOLIN ALCOHOL/MO/W.PET/CERES
6 CREAM (GRAM) TOPICAL
Status: DISCONTINUED | OUTPATIENT
Start: 2022-01-01 | End: 2022-01-01 | Stop reason: HOSPADM

## 2022-01-01 RX ORDER — HYDROMORPHONE HCL/PF 1 MG/ML
0.3 SYRINGE (ML) INJECTION EVERY 2 HOUR PRN
Status: DISCONTINUED | OUTPATIENT
Start: 2022-01-01 | End: 2022-01-01

## 2022-01-01 RX ORDER — OXYCODONE HYDROCHLORIDE AND ACETAMINOPHEN 5; 325 MG/1; MG/1
1 TABLET ORAL EVERY 6 HOURS PRN
Status: DISCONTINUED | OUTPATIENT
Start: 2022-01-01 | End: 2022-01-01

## 2022-01-01 RX ORDER — ATORVASTATIN CALCIUM 80 MG/1
80 TABLET, FILM COATED ORAL
Status: DISCONTINUED | OUTPATIENT
Start: 2022-01-01 | End: 2022-01-01

## 2022-01-01 RX ORDER — MORPHINE SULFATE 100 MG/5ML
5 SOLUTION, CONCENTRATE ORAL
Status: DISCONTINUED | OUTPATIENT
Start: 2022-01-01 | End: 2022-01-01

## 2022-01-01 RX ORDER — MORPHINE SULFATE 100 MG/5ML
5 SOLUTION, CONCENTRATE ORAL
Status: DISCONTINUED | OUTPATIENT
Start: 2022-01-01 | End: 2022-01-01 | Stop reason: HOSPADM

## 2022-01-01 RX ORDER — SODIUM CHLORIDE, SODIUM GLUCONATE, SODIUM ACETATE, POTASSIUM CHLORIDE, MAGNESIUM CHLORIDE, SODIUM PHOSPHATE, DIBASIC, AND POTASSIUM PHOSPHATE .53; .5; .37; .037; .03; .012; .00082 G/100ML; G/100ML; G/100ML; G/100ML; G/100ML; G/100ML; G/100ML
40 INJECTION, SOLUTION INTRAVENOUS CONTINUOUS
Status: DISCONTINUED | OUTPATIENT
Start: 2022-01-01 | End: 2022-01-01

## 2022-01-01 RX ORDER — LEVOTHYROXINE SODIUM 0.03 MG/1
25 TABLET ORAL
Status: DISCONTINUED | OUTPATIENT
Start: 2022-01-01 | End: 2022-01-01

## 2022-01-01 RX ORDER — GLYCOPYRROLATE 0.2 MG/ML
0.1 INJECTION INTRAMUSCULAR; INTRAVENOUS EVERY 4 HOURS PRN
Status: DISCONTINUED | OUTPATIENT
Start: 2022-01-01 | End: 2022-01-01

## 2022-01-01 RX ORDER — PROPRANOLOL HYDROCHLORIDE 80 MG/1
160 CAPSULE, EXTENDED RELEASE ORAL DAILY
Status: DISCONTINUED | OUTPATIENT
Start: 2022-01-01 | End: 2022-01-01

## 2022-01-01 RX ORDER — LEVETIRACETAM 100 MG/ML
500 SOLUTION ORAL EVERY 12 HOURS SCHEDULED
Status: DISCONTINUED | OUTPATIENT
Start: 2022-01-01 | End: 2022-01-01

## 2022-01-01 RX ORDER — ACETAMINOPHEN 325 MG/1
650 TABLET ORAL EVERY 6 HOURS PRN
Status: DISCONTINUED | OUTPATIENT
Start: 2022-01-01 | End: 2022-01-01

## 2022-01-01 RX ORDER — LORAZEPAM 0.5 MG/1
0.5 TABLET ORAL EVERY 6 HOURS PRN
Status: DISCONTINUED | OUTPATIENT
Start: 2022-01-01 | End: 2022-01-01 | Stop reason: HOSPADM

## 2022-01-01 RX ORDER — MORPHINE SULFATE 100 MG/5ML
2.5 SOLUTION, CONCENTRATE ORAL
Status: DISCONTINUED | OUTPATIENT
Start: 2022-01-01 | End: 2022-01-01 | Stop reason: HOSPADM

## 2022-01-01 RX ORDER — BISACODYL 10 MG
10 SUPPOSITORY, RECTAL RECTAL DAILY PRN
Status: DISCONTINUED | OUTPATIENT
Start: 2022-01-01 | End: 2022-01-01 | Stop reason: HOSPADM

## 2022-01-01 RX ADMIN — LEVETIRACETAM 500 MG: 100 INJECTION, SOLUTION INTRAVENOUS at 09:36

## 2022-01-01 RX ADMIN — MORPHINE SULFATE 5 MG: 20 SOLUTION ORAL at 11:59

## 2022-01-01 RX ADMIN — LEVETIRACETAM 500 MG: 100 INJECTION, SOLUTION INTRAVENOUS at 23:37

## 2022-01-01 RX ADMIN — LEVETIRACETAM 500 MG: 100 INJECTION, SOLUTION INTRAVENOUS at 09:16

## 2022-01-01 RX ADMIN — MORPHINE SULFATE 5 MG: 20 SOLUTION ORAL at 15:10

## 2022-01-01 RX ADMIN — LEVETIRACETAM 500 MG: 100 INJECTION, SOLUTION INTRAVENOUS at 22:52

## 2022-01-01 RX ADMIN — LEVETIRACETAM 500 MG: 100 INJECTION, SOLUTION INTRAVENOUS at 08:09

## 2022-01-01 RX ADMIN — HYDROMORPHONE HYDROCHLORIDE 0.3 MG: 1 INJECTION, SOLUTION INTRAMUSCULAR; INTRAVENOUS; SUBCUTANEOUS at 03:11

## 2022-01-01 RX ADMIN — LEVETIRACETAM 500 MG: 100 INJECTION, SOLUTION INTRAVENOUS at 08:42

## 2022-01-01 RX ADMIN — LEVETIRACETAM 500 MG: 100 INJECTION, SOLUTION INTRAVENOUS at 21:10

## 2022-01-01 RX ADMIN — SODIUM CHLORIDE, SODIUM GLUCONATE, SODIUM ACETATE, POTASSIUM CHLORIDE, MAGNESIUM CHLORIDE, SODIUM PHOSPHATE, DIBASIC, AND POTASSIUM PHOSPHATE 75 ML/HR: .53; .5; .37; .037; .03; .012; .00082 INJECTION, SOLUTION INTRAVENOUS at 20:48

## 2022-01-01 RX ADMIN — LEVETIRACETAM 500 MG: 100 INJECTION, SOLUTION INTRAVENOUS at 21:08

## 2022-01-01 RX ADMIN — IOHEXOL 85 ML: 350 INJECTION, SOLUTION INTRAVENOUS at 20:24

## 2022-01-01 RX ADMIN — LEVETIRACETAM 500 MG: 100 INJECTION, SOLUTION INTRAVENOUS at 08:01

## 2022-01-01 RX ADMIN — LEVETIRACETAM 500 MG: 100 INJECTION, SOLUTION INTRAVENOUS at 11:58

## 2022-01-01 RX ADMIN — LEVETIRACETAM 500 MG: 100 INJECTION, SOLUTION INTRAVENOUS at 21:18

## 2022-01-01 RX ADMIN — LEVETIRACETAM 500 MG: 100 INJECTION, SOLUTION INTRAVENOUS at 21:24

## 2022-09-08 ENCOUNTER — APPOINTMENT (EMERGENCY)
Dept: CT IMAGING | Facility: HOSPITAL | Age: 87
DRG: 085 | End: 2022-09-08
Payer: MEDICARE

## 2022-09-08 ENCOUNTER — APPOINTMENT (OUTPATIENT)
Dept: RADIOLOGY | Facility: HOSPITAL | Age: 87
DRG: 085 | End: 2022-09-08
Payer: MEDICARE

## 2022-09-08 ENCOUNTER — HOSPITAL ENCOUNTER (EMERGENCY)
Facility: HOSPITAL | Age: 87
DRG: 085 | End: 2022-09-08
Attending: EMERGENCY MEDICINE
Payer: MEDICARE

## 2022-09-08 VITALS
RESPIRATION RATE: 18 BRPM | HEART RATE: 66 BPM | BODY MASS INDEX: 25.91 KG/M2 | WEIGHT: 132 LBS | TEMPERATURE: 97.9 F | DIASTOLIC BLOOD PRESSURE: 82 MMHG | HEIGHT: 60 IN | OXYGEN SATURATION: 98 % | SYSTOLIC BLOOD PRESSURE: 129 MMHG

## 2022-09-08 DIAGNOSIS — I61.9 ACUTE INTRACEREBRAL HEMORRHAGE (HCC): Primary | ICD-10-CM

## 2022-09-08 DIAGNOSIS — S12.100A CLOSED ODONTOID FRACTURE, INITIAL ENCOUNTER (HCC): ICD-10-CM

## 2022-09-08 DIAGNOSIS — S42.415A CLOSED NONDISPLACED SIMPLE SUPRACONDYLAR FRACTURE OF LEFT HUMERUS WITHOUT INTERCONDYLAR FRACTURE, INITIAL ENCOUNTER: ICD-10-CM

## 2022-09-08 PROBLEM — M97.9XXA: Status: ACTIVE | Noted: 2022-09-08
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
## 2022-09-08 PROBLEM — S12.110A DENS FRACTURE (HCC): Status: ACTIVE | Noted: 2022-01-01

## 2022-09-08 PROBLEM — S06.5X9A SUBDURAL HEMATOMA, ACUTE (HCC): Status: ACTIVE | Noted: 2022-09-08

## 2022-09-08 PROBLEM — S06.5XAA SUBDURAL HEMATOMA, ACUTE: Status: ACTIVE | Noted: 2022-01-01

## 2022-09-08 LAB
ANION GAP SERPL CALCULATED.3IONS-SCNC: 5 MMOL/L (ref 4–13)
APTT PPP: 36 SECONDS (ref 23–37)
BASOPHILS # BLD AUTO: 0.03 THOUSANDS/ΜL (ref 0–0.1)
BASOPHILS NFR BLD AUTO: 1 % (ref 0–1)
BUN SERPL-MCNC: 19 MG/DL (ref 5–25)
CALCIUM SERPL-MCNC: 10.1 MG/DL (ref 8.3–10.1)
CHLORIDE SERPL-SCNC: 108 MMOL/L (ref 96–108)
CO2 SERPL-SCNC: 30 MMOL/L (ref 21–32)
CREAT SERPL-MCNC: 1.2 MG/DL (ref 0.6–1.3)
EOSINOPHIL # BLD AUTO: 0.23 THOUSAND/ΜL (ref 0–0.61)
EOSINOPHIL NFR BLD AUTO: 5 % (ref 0–6)
ERYTHROCYTE [DISTWIDTH] IN BLOOD BY AUTOMATED COUNT: 14 % (ref 11.6–15.1)
GFR SERPL CREATININE-BSD FRML MDRD: 38 ML/MIN/1.73SQ M
GLUCOSE SERPL-MCNC: 103 MG/DL (ref 65–140)
HCT VFR BLD AUTO: 37.2 % (ref 34.8–46.1)
HGB BLD-MCNC: 12.1 G/DL (ref 11.5–15.4)
IMM GRANULOCYTES # BLD AUTO: 0.01 THOUSAND/UL (ref 0–0.2)
IMM GRANULOCYTES NFR BLD AUTO: 0 % (ref 0–2)
INR PPP: 1.55 (ref 0.84–1.19)
LYMPHOCYTES # BLD AUTO: 1.36 THOUSANDS/ΜL (ref 0.6–4.47)
LYMPHOCYTES NFR BLD AUTO: 30 % (ref 14–44)
MCH RBC QN AUTO: 33.1 PG (ref 26.8–34.3)
MCHC RBC AUTO-ENTMCNC: 32.5 G/DL (ref 31.4–37.4)
MCV RBC AUTO: 102 FL (ref 82–98)
MONOCYTES # BLD AUTO: 0.59 THOUSAND/ΜL (ref 0.17–1.22)
MONOCYTES NFR BLD AUTO: 13 % (ref 4–12)
NEUTROPHILS # BLD AUTO: 2.34 THOUSANDS/ΜL (ref 1.85–7.62)
NEUTS SEG NFR BLD AUTO: 51 % (ref 43–75)
NRBC BLD AUTO-RTO: 0 /100 WBCS
PLATELET # BLD AUTO: 79 THOUSANDS/UL (ref 149–390)
PMV BLD AUTO: 10.2 FL (ref 8.9–12.7)
POTASSIUM SERPL-SCNC: 3.7 MMOL/L (ref 3.5–5.3)
PROTHROMBIN TIME: 18.5 SECONDS (ref 11.6–14.5)
RBC # BLD AUTO: 3.66 MILLION/UL (ref 3.81–5.12)
SODIUM SERPL-SCNC: 143 MMOL/L (ref 135–147)
WBC # BLD AUTO: 4.56 THOUSAND/UL (ref 4.31–10.16)

## 2022-09-08 PROCEDURE — G1004 CDSM NDSC: HCPCS

## 2022-09-08 PROCEDURE — 96365 THER/PROPH/DIAG IV INF INIT: CPT

## 2022-09-08 PROCEDURE — 99285 EMERGENCY DEPT VISIT HI MDM: CPT

## 2022-09-08 PROCEDURE — 85025 COMPLETE CBC W/AUTO DIFF WBC: CPT | Performed by: PHYSICIAN ASSISTANT

## 2022-09-08 PROCEDURE — 85730 THROMBOPLASTIN TIME PARTIAL: CPT | Performed by: PHYSICIAN ASSISTANT

## 2022-09-08 PROCEDURE — 73060 X-RAY EXAM OF HUMERUS: CPT

## 2022-09-08 PROCEDURE — 80048 BASIC METABOLIC PNL TOTAL CA: CPT | Performed by: PHYSICIAN ASSISTANT

## 2022-09-08 PROCEDURE — 36415 COLL VENOUS BLD VENIPUNCTURE: CPT | Performed by: PHYSICIAN ASSISTANT

## 2022-09-08 PROCEDURE — 72125 CT NECK SPINE W/O DYE: CPT

## 2022-09-08 PROCEDURE — 85610 PROTHROMBIN TIME: CPT | Performed by: PHYSICIAN ASSISTANT

## 2022-09-08 PROCEDURE — 99291 CRITICAL CARE FIRST HOUR: CPT | Performed by: PHYSICIAN ASSISTANT

## 2022-09-08 PROCEDURE — 70450 CT HEAD/BRAIN W/O DYE: CPT

## 2022-09-08 RX ORDER — CHOLECALCIFEROL (VITAMIN D3) 125 MCG
1 CAPSULE ORAL
COMMUNITY
End: 2022-09-16

## 2022-09-08 RX ADMIN — DESMOPRESSIN ACETATE 24 MCG: 4 SOLUTION INTRAVENOUS at 16:34

## 2022-09-08 NOTE — ASSESSMENT & PLAN NOTE
9/8 Xray left humerus: Findings indeterminate for a small nondisplaced periprosthetic fracture along the proximal aspect of the humerus      Plan:  - ortho consult  - maintain sling

## 2022-09-08 NOTE — ASSESSMENT & PLAN NOTE
9/8 CT Head: 3 0 cm acute mixed-density hemorrhagic brain contusion of right anterior-inferior frontal lobe with moderate surrounding vasogenic edema, 1 0 cm thick acute subdural hematoma along right frontal cerebral convexity with some extension of blood products into the right parafalcine and right tentorium cerebelli, Trace intraventricular hemorrhage in the occipital horn of left lateral ventricle        Plan:  - neurosurgery consult

## 2022-09-08 NOTE — ED NOTES
Marleni Marker spoke with family (daughter in [place]), aware of pt condition & transfer to trauma facility       Sunita Webb RN  09/08/22 1761

## 2022-09-08 NOTE — H&P
[address]  H&P- Maria G Britt 8/29/1926, 80 y o  female MRN: [id number]  Unit/Bed#:  Encounter: [id number]  Primary Care Provider: Ace Huang DO   Date and time admitted to hospital: No admission date for patient encounter  Subdural hematoma, acute [facility])  Assessment & Plan  9/8 CT Head: 3 0 cm acute mixed-density hemorrhagic brain contusion of right anterior-inferior frontal lobe with moderate surrounding vasogenic edema, 1 0 cm thick acute subdural hematoma along right frontal cerebral convexity with some extension of blood products into the right parafalcine and right tentorium cerebelli, Trace intraventricular hemorrhage in the occipital horn of left lateral ventricle  Plan:  - neurosurgery consult    Dens fracture [facility])  Assessment & Plan  9/8 CT cervical spine: New acute C2 fracture involving base of dens  Plan:  - consult neurosurgery  - cervical collar in place    Periprosthetic fracture of shoulder  Assessment & Plan  9/8 Xray left humerus: Findings indeterminate for a small nondisplaced periprosthetic fracture along the proximal aspect of the humerus  Plan:  - ortho consult  - maintain sling    Fall  Assessment & Plan  Witnessed fall  Struck her head on dining table  On plavix    Dementia ([facility] [address] )  Assessment & Plan  At baseline  Unable to provide history        Trauma Alert: Other transfer from [facility] of Arrival: Transfer from [place] [address] Team: Faye Grover  Consultants:     Orthopedics: routine consult; Epic consult order placed and consultant notified (via phone/text @ time 8:22PM); Neurosurgery: routine consult; Epic consult order placed and consultant notified (via phone/text @ time 8:22PM); History of Present Illness     Chief Complaint: fall  Mechanism:Fall     HPI:    Maria G Britt is a 80 y o  female who presents as a transfer from [facility] after sustaining a witnessed fall  She is on Plavix   The patient was unable to provide further history given her dementia  She was transferred to [facility] for escalation of care for her neurological injuries  Review of Systems   Unable to perform ROS: Dementia     12-point, complete review of systems was reviewed and negative except as stated above  Historical Information     Past Medical History:   Diagnosis Date    Disease of thyroid gland     Hypertension      Past Surgical History:   Procedure Laterality Date    EYE SURGERY      MD RECONSTR TOTAL SHOULDER IMPLANT Left 12/10/2018    Procedure: LEFT REVERSE TOTAL SHOULDER ARTHROPLASTY;  Surgeon: Carlos Greene MD;  [facility];  Service: Orthopedics        Social History     Tobacco Use    Smoking status: Never Smoker    Smokeless tobacco: Never Used   Vaping Use    Vaping Use: Never used   Substance Use Topics    Alcohol use: No    Drug use: No     Immunization History   Administered Date(s) Administered    Tdap 02/12/2021     Family History: Non-contributory    1  Before the illness or injury that brought you to the Emergency, did you need someone to help you on a regular basis? 1=Yes   2  Since the illness or injury that brought you to the Emergency, have you needed more help than usual to take care of yourself? 1=Yes   3  Have you been hospitalized for one or more nights during the past 6 months (excluding a stay in the Emergency Department)? 0=No   4  In general, do you see well? 0=Yes   5  In general, do you have serious problems with your memory? 1=Yes   6  Do you take more than three different medications everyday? 1=Yes   TOTAL   4     Did you order a geriatric consult if the score was 2 or greater?: yes     Meds/Allergies   current meds:   No current facility-administered medications for this encounter  and PTA meds:   Cannot display prior to admission medications because the patient has not been admitted in this contact        No Known Allergies    Objective   Initial Vitals:        Primary Survey:   Airway:        Status: patent;        Pre-hospital Interventions: none        Hospital Interventions: none  Breathing:        Pre-hospital Interventions: none       Effort: normal       Right breath sounds: normal       Left breath sounds: normal  Circulation:        Rhythm: regular       Rate: regular   Right Pulses Left Pulses    R radial: 2+         L radial: 2+           Disability:        GCS: Eye: 4; Verbal: 5 Motor: 6 Total: 15       Right Pupil: 2 mm;  round; Left Pupil:  5 mm;  not round;     R Motor Strength L Motor Strength               Exposure:       Completed: No      Secondary Survey:  Physical Exam  Vitals reviewed  Constitutional:       General: She is not in acute distress  HENT:      Head: Normocephalic and atraumatic  Right Ear: External ear normal       Left Ear: External ear normal       Nose: Nose normal       Mouth/Throat:      Mouth: Mucous membranes are moist    Eyes:      Extraocular Movements: Extraocular movements intact  Conjunctiva/sclera: Conjunctivae normal    Neck:      Comments: Cervical collar in place  Cardiovascular:      Rate and Rhythm: Normal rate and regular rhythm  Pulses: Normal pulses  Heart sounds: Normal heart sounds  Pulmonary:      Effort: Pulmonary effort is normal  No respiratory distress  Abdominal:      General: There is no distension  Palpations: Abdomen is soft  Tenderness: There is no guarding  Musculoskeletal:      Comments: Sling on left arm for shoulder stability   Skin:     General: Skin is warm  Neurological:      Mental Status: She is alert  Mental status is at baseline  She is disoriented           Invasive Devices  Report    Peripheral Intravenous Line  Duration           Peripheral IV 09/08/22 Right Antecubital <1 day              Lab Results:   BMP/CMP:   Lab Results   Component Value Date    SODIUM 143 09/08/2022    K 3 7 09/08/2022     09/08/2022    CO2 30 09/08/2022    BUN 19 09/08/2022    CREATININE 1 20 09/08/2022    CALCIUM 10 1 09/08/2022    EGFR 38 09/08/2022    and CBC:   Lab Results   Component Value Date    WBC 4 56 09/08/2022    HGB 12 1 09/08/2022    HCT 37 2 09/08/2022     (H) 09/08/2022    PLT 79 (L) 09/08/2022    MCH 33 1 09/08/2022    MCHC 32 5 09/08/2022    RDW 14 0 09/08/2022    MPV 10 2 09/08/2022    NRBC 0 09/08/2022       Imaging Results: I have personally reviewed pertinent films in PACS  Chest Xray(s): N/A   FAST exam(s): N/A   CT Scan(s): positive for acute findings: C2 fracture involving dens, subdural hematoma   Additional Xray(s): positive for acute findings: periprosthetic fracture of the left humerus

## 2022-09-08 NOTE — ED NOTES
Patient return from [id number] [place] [address], [address] [place] [address]  09/08/22 4059

## 2022-09-08 NOTE — ED PROVIDER NOTES
History  Chief Complaint   Patient presents with    Fall     Fall, may have hit head, no injury noted, pt has no complaints, no blood thinners  This is a 43-year-old female with a past medical history of hypertension, dementia that presents to the emergency department from [facility] for witnessed fall  She apparently felt the dining chandra and may have struck the back of her head off of a table  She is on Plavix  No known loss of consciousness  She offers no complaints secondary to her dementia  Fall      Prior to Admission Medications   Prescriptions Last Dose Informant Patient Reported? Taking?    Bisacodyl (DULCOLAX RE)   Yes Yes   Sig: Insert into the rectum   Cholecalciferol (VITAMIN D3) 1000 units CAPS  Self Yes Yes   Sig: Daily   Loperamide HCl (LOPERAMIDE A-D PO)   Yes Yes   Sig: Take 1 tablet by mouth every 6 (six) hours as needed   Melatonin 5 MG TABS   Yes Yes   Sig: Take 1 tablet by mouth daily at bedtime   Menthol-Methyl Salicylate (ALEXANDRO ARGUELLO GREASELESS) 10-15 % greaseless cream   No No   Sig: Applied to affected area QID as needed   Potassium 99 MG TABS  Self Yes Yes   Sig: Take 99 mg by mouth    acetaminophen (TYLENOL) 325 mg tablet   Yes No   Sig: Take 650 mg by mouth every 6 (six) hours as needed for mild pain   atorvastatin (LIPITOR) 80 mg tablet   Yes Yes   Sig: Take 80 mg by mouth daily   calcium carbonate-vitamin D (OSCAL-D) 500 mg-200 units per tablet   Yes No   Sig: Take 1 tablet by mouth daily with breakfast   clopidogrel (PLAVIX) 75 mg tablet   Yes Yes   Sig: Take 75 mg by mouth daily   fluticasone (FLONASE) 50 mcg/act nasal spray   Yes No   Si spray into each nostril daily   levothyroxine 25 mcg tablet  Self Yes Yes   Sig: Take 25 mcg by mouth daily    magnesium hydroxide (MILK OF MAGNESIA) 400 mg/5 mL oral suspension   Yes Yes   Sig: Take by mouth daily as needed for constipation   propranolol (INDERAL LA) 160 mg  Self Yes Yes   Sig: 160 mg daily     sertraline (ZOLOFT) 25 mg tablet   Yes Yes   Sig: Take 50 mg by mouth daily    traZODone (DESYREL) 50 mg tablet   Yes Yes   Sig: Take 50 mg by mouth daily at bedtime as needed for sleep   vitamin B-12 (VITAMIN B-12) 1,000 mcg tablet   Yes Yes   Sig: Take by mouth daily      Facility-Administered Medications: None       Past Medical History:   Diagnosis Date    Disease of thyroid gland     Hypertension        Past Surgical History:   Procedure Laterality Date    EYE SURGERY      SD RECONSTR TOTAL SHOULDER IMPLANT Left 12/10/2018    Procedure: LEFT REVERSE TOTAL SHOULDER ARTHROPLASTY;  Surgeon: Mortimer Mathieu, MD;  [facility];  Service: Orthopedics       Family History   Problem Relation Age of Onset    No Known Problems Mother     No Known Problems Father      I have reviewed and agree with the history as documented  E-Cigarette/Vaping    E-Cigarette Use Never User      E-Cigarette/Vaping Substances     Social History     Tobacco Use    Smoking status: Never Smoker    Smokeless tobacco: Never Used   Vaping Use    Vaping Use: Never used   Substance Use Topics    Alcohol use: No    Drug use: No       Review of Systems   Unable to perform ROS: Dementia       Physical Exam  Physical Exam  Vitals and nursing note reviewed  Constitutional:       General: She is not in acute distress  Appearance: Normal appearance  She is not ill-appearing, toxic-appearing or diaphoretic  HENT:      Head: Normocephalic and atraumatic  Nose: Nose normal       Mouth/Throat:      Mouth: Mucous membranes are moist    Eyes:      General: No scleral icterus  Pupils: Pupils are equal, round, and reactive to light  Cardiovascular:      Rate and Rhythm: Normal rate and regular rhythm  Pulses: Normal pulses  Heart sounds: Normal heart sounds  Pulmonary:      Effort: Pulmonary effort is normal       Breath sounds: Normal breath sounds  Abdominal:      General: Abdomen is flat  There is no distension  Palpations: Abdomen is soft  Tenderness: There is no abdominal tenderness  There is no guarding or rebound  Hernia: No hernia is present  Musculoskeletal:         General: Swelling, deformity and signs of injury present  No tenderness  Normal range of motion  Left upper arm: Swelling present  Arms:       Cervical back: Normal range of motion  Right lower leg: No edema  Left lower leg: No edema  Skin:     General: Skin is warm  Capillary Refill: Capillary refill takes less than 2 seconds  Neurological:      Mental Status: She is alert  Mental status is at baseline     Psychiatric:         Mood and Affect: Mood normal          Behavior: Behavior normal          Vital Signs  ED Triage Vitals   Temperature Pulse Respirations Blood Pressure SpO2   09/08/22 1324 09/08/22 1324 09/08/22 1324 09/08/22 1324 09/08/22 1324   97 9 °F (36 6 °C) 67 16 136/82 97 %      Temp Source Heart Rate Source Patient Position - Orthostatic VS BP Location FiO2 (%)   09/08/22 1324 09/08/22 1531 -- -- --   Oral Monitor         Pain Score       09/08/22 1324       No Pain           Vitals:    09/08/22 1324 09/08/22 1531   BP: 136/82 141/89   Pulse: 67 62         Visual Acuity      ED Medications  Medications   desmopressin (DDAVP) 24 mcg in sodium chloride 0 9 % 50 mL IVPB (has no administration in time range)       Diagnostic Studies  Results Reviewed     Procedure Component Value Units Date/Time    CBC and differential [[id number]]     Lab Status: No result Specimen: Blood     Basic metabolic panel [[id number]]     Lab Status: No result Specimen: Blood     Protime-INR [[id number]]     Lab Status: No result Specimen: Blood     APTT [[id number]]     Lab Status: No result Specimen: Blood                  CT head without contrast   Final Result by Roby Juan MD (09/08 1553)      3 0 cm acute mixed-density hemorrhagic brain contusion of right anterior-inferior frontal lobe with moderate surrounding vasogenic edema  1 0 cm thick acute subdural hematoma along right frontal cerebral convexity with some extension of blood products into the right parafalcine and right tentorium cerebelli  Trace intraventricular hemorrhage in the occipital horn of left lateral ventricle  Unchanged 0 9 cm left frontal meningioma  Unchanged chronic multifocal infarcts and severe chronic microadenopathy  Severe pansinus disease with probable nasal polyposis  I personally discussed this study with Ekaterina Feng on 9/8/2022 at 3:46 PM                Workstation performed: [id number]         CT spine cervical without contrast   Final Result by Mann Montanez MD (09/08 1602)      New acute C2 fracture involving base of dens  Additional chronic/incidental findings as detailed above  I personally discussed this study with Ekaterina Roys on 9/8/2022 at 3:46 PM                    Workstation performed: [id number]         XR humerus LEFT   ED Interpretation by Woo Quinonez PA-C (09/08 1350)   No acute osseous abnormality noted      Final Result by Carol Colunga MD (09/08 1403)      Findings indeterminate for a small nondisplaced periprosthetic fracture along the proximal aspect of the humerus  The study was marked in [facility] for immediate notification        Workstation performed: [id number]                    Procedures  CriticalCare Time  Performed by: Woo Quinonez PA-C  Authorized by: Amanda Velazquez DO     Critical care provider statement:     Critical care time (minutes):  60    Critical care was necessary to treat or prevent imminent or life-threatening deterioration of the following conditions:  CNS failure or compromise    Critical care was time spent personally by me on the following activities:  Obtaining history from patient or surrogate, development of treatment plan with patient or surrogate, discussions with consultants, discussions with primary provider, evaluation of patient's response to treatment, examination of patient, ordering and performing treatments and interventions, ordering and review of laboratory studies, ordering and review of radiographic studies, re-evaluation of patient's condition and review of old charts             ED Course  ED Course as of 09/08/22 1628   Thu Sep 08, 2022   1408 Xray: IMPRESSION:     Findings indeterminate for a small nondisplaced periprosthetic fracture along the proximal aspect of the humerus  1409 TT to ortho for recommendations   1734 Ortho recommends sling and NWB and outpatient ortho follow up   32 61 16 Call to Majo from [facility] regarding DNR   2770 Labs ordered, C collar placed   1556 CT head: IMPRESSION:     3 0 cm acute mixed-density hemorrhagic brain contusion of right anterior-inferior frontal lobe with moderate surrounding vasogenic edema      1 0 cm thick acute subdural hematoma along right frontal cerebral convexity with some extension of blood products into the right parafalcine and right tentorium cerebelli        Trace intraventricular hemorrhage in the occipital horn of left lateral ventricle        Unchanged 0 9 cm left frontal meningioma      Unchanged chronic multifocal infarcts and severe chronic microadenopathy      Severe pansinus disease with probable nasal polyposis  1604 CT cervical spine: IMPRESSION:     New acute C2 fracture involving base of dens      1613 This is of to the patient's daughter Grabiel Roy who lives in [place] who states that she is a DNR and that if further surgical intervention were needed that they would likely declined given her age and current mental status  She would like to be updated on the patient's status but is agreeable to ongoing medical interventions as needed     1625 Patient accepted by Dr Nino Ríos, trauma, for transfer                               SBIRT 20yo+    Flowsheet Row Most Recent Value   SBIRT (25 yo +)    In order to provide better care to our patients, we are screening all of our patients for alcohol and drug use  Would it be okay to ask you these screening questions? Unable to answer at this time Filed at: 09/08/2022 1511                    MDM  Number of Diagnoses or Management Options  Acute intracerebral hemorrhage [facility]): new and requires workup  Closed nondisplaced simple supracondylar fracture of left humerus without intercondylar fracture, initial encounter: new and requires workup  Closed odontoid fracture, initial encounter [facility]): new and requires workup     Amount and/or Complexity of Data Reviewed  Clinical lab tests: reviewed and ordered  Tests in the radiology section of CPT®: reviewed and ordered  Discussion of test results with the performing providers: yes  Obtain history from someone other than the patient: yes  Review and summarize past medical records: yes  Discuss the patient with other providers: yes  Independent visualization of images, tracings, or specimens: yes        Disposition  Final diagnoses:   Acute intracerebral hemorrhage ([facility] [place] [address] )   Closed nondisplaced simple supracondylar fracture of left humerus without intercondylar fracture, initial encounter   Closed odontoid fracture, initial encounter ([facility] [place] [address] )     Time reflects when diagnosis was documented in both MDM as applicable and the Disposition within this note     Time User Action Codes Description Comment    9/8/2022  4:14 PM Glenn, [facility]8 [facility] [I61 9] Acute intracerebral hemorrhage ([facility] [place] [address] )     9/8/2022  4:15 PM Alicia Elizabeth Cost Add [S42 415A] Closed nondisplaced simple supracondylar fracture of left humerus without intercondylar fracture, initial encounter     9/8/2022  4:15 PM Alicia Elizabeth Cost Add [S12 100A] Closed odontoid fracture, initial encounter [facility])       ED Disposition     ED Disposition   Transfer to Another Facility-In Network    Condition   --    Date/Time   Thu Sep 8, 2022  4:20 PM    Comment   Yanet Monzon should be transferred out to [facility] Documentation    Flowsheet Row Most Recent Value   Patient Condition The patient has been stabilized such that within reasonable medical probability, no material deterioration of the patient condition or the condition of the unborn child(ankush) is likely to result from the transfer   Reason for Transfer Level of Care needed not available at this facility   Benefits of Transfer Specialized equipment and/or services available at the receiving facility (Include comment)________________________   Risks of Transfer Potential for delay in receiving treatment, Potential deterioration of medical condition, Loss of IV, Increased discomfort during transfer, Possible worsening of condition or death during transfer   Accepting Physician Dr Carla Edwards Name, [address]  [facility]    (Name & Tel number) Carlo Peres RN   Sending MD Puneet Fung PA-C   Provider Certification General risk, such as traffic hazards, adverse weather conditions, rough terrain or turbulence, possible failure of equipment (including vehicle or aircraft), or consequences of actions of persons outside the control of the transport personnel, Unanticipated needs of medical equipment and personnel during transport, The possibility of a transport vehicle being unavailable, Risk of worsening condition      RN Documentation    Flowsheet Row Most [facility] Name, [address]  [facility]    (Name & Tel number) Carlo Peres RN      Follow-up Information    None         Patient's Medications   Discharge Prescriptions    No medications on file       No discharge procedures on file      PDMP Review     None          ED Provider  Electronically Signed by           Yessica Chris PA-C  09/08/22 7516

## 2022-09-08 NOTE — PROGRESS NOTES
Consult - Trevor [age] y o  female MRN: [id number]  Unit/Bed#: ED 25 Encounter: [id number]      -------------------------------------------------------------------------------------------------------------  Chief Complaint:   Subdural hematoma  C2 fracture  Left humerus fracture    History of Present Illness   HX and PE limited by: Pt non-verbal on PE  Dorie Saxena is a 80 y o  female who presented initially -[place] who had a fall from standing height and possibly struck the back of her head  Unknown LOC  Pt on plavix  Their workup showed a 1 0 cm thick acute subdural hematoma along right frontal cerebral convexity with some extension of blood products into the right parafalcine and right tentorium cerebelli, 3 0 cm acute mixed-density hemorrhagic brain contusion of right anterior-inferior frontal lobe with moderate surrounding vasogenic edema, C2 fracture involving base of dens, and left humerus fracture involving the proximal aspect of humerus, small nondisplaced periprosthetic fracture       History obtained from chart review and unobtainable from patient due to lack of cooperation   -------------------------------------------------------------------------------------------------------------  Assessment and Plan:    Neuro:    Diagnosis: Subdural hematoma  o Plan:   - NSx consult   - Perform CTa head and neck w/wo contrast to evaluate injury   - q1hr neuro checks   - Load with keppra for seizure px   Diagnosis: C2 Fracture  o Plan:   - NSx consult   - Maintain cervical collar  - Multimodal pain analgesia    Diagnosis: Dementia  o Plan:   - Delirium precautions   - Sleep wake schedule   - Melatonin 5mg qh   Diagnosis: Depression  o Plan:   - Continue sertraline 25 mg  - Monitor for SI/HI/AVH   Diagnosis: hx of small acute ischemic stroke 6/2019  o Plan:   - Right temporal CVA with infarctions in occipital lobe   - plavix 75 mg po qd when cleared by NSx      CV:    Diagnosis: HTN  o Plan: propranolol 160mg po qd   Diagnosis: HLD  o Plan: atorvastatin 80mg po qd      Pulm:   Diagnosis: surveillance   o Plan:   - Keep SpO2 > 92%  - Use supplemental O2 if needed       GI:    Diagnosis: bowel regiment   o Plan:   - senokot   - Titrate to 1 BM per day      :    Diagnosis: CKD  o Plan:   - Monitor BUN/Cr  - Avoid nephrotoxic agents if possible    Diagnosis: UOP  o Plan:   - Strict I/Os      F/E/N:    Plan: Hypokalemia   o F: start isolyte at 75mls/hr  o E: monitor for hypokalemia, replete Mg> 2, phos>3, K>4  o N: NPO till NSx eval       Heme/Onc:    Diagnosis: chemoprophylaxis   o Plan: hold until NSx clearance  o Hep vs Lovenox if WILTON   Diagnosis: DVT Px  o Plan: SCDs      Endo:    Diagnosis: Hypothyroidism  o Plan:   - Continue levothyroxine 25 mcg qd   Diagnosis: Blood glucose  o Plan: monitor       ID:    Diagnosis: surveillance  o Plan: trend temp and wbc      MSK/Skin:    Diagnosis: Periprosthetic fracture L humerus   o Plan:   - Ortho consult   - Multimodal pain analgesia   -     Disposition: Admit to Critical Care   Code Status: Prior  --------------------------------------------------------------------------------------------------------------  Review of Systems   Unable to perform ROS: Dementia       Review of systems was unable to be performed secondary to dementia    Physical Exam  Vitals and nursing note reviewed  Constitutional:       General: She is not in acute distress  Appearance: She is well-developed  HENT:      Head: Normocephalic and atraumatic  Eyes:      Extraocular Movements:      Right eye: Normal extraocular motion and no nystagmus  Left eye: Normal extraocular motion and no nystagmus  Conjunctiva/sclera: Conjunctivae normal       Comments: Left iris has unequal shape on medial aspect  Reactive to light  Cardiovascular:      Rate and Rhythm: Normal rate and regular rhythm  Heart sounds: No murmur heard    Pulmonary: Effort: Pulmonary effort is normal  No respiratory distress  Breath sounds: Normal breath sounds  Abdominal:      Palpations: Abdomen is soft  Tenderness: There is no abdominal tenderness  Musculoskeletal:      Cervical back: Neck supple  Skin:     General: Skin is warm and dry  Capillary Refill: Capillary refill takes less than 2 seconds  Neurological:      Mental Status: She is alert  Cranial Nerves: No cranial nerve deficit        --------------------------------------------------------------------------------------------------------------  Vitals:   Vitals:    09/08/22 1855 09/08/22 1900   BP: 139/83 114/61   BP Location:  Right arm   Pulse: 66 68   Resp: 18 18   Temp: 97 8 °F (36 6 °C)    TempSrc: Oral    SpO2: 98% 98%     Temp  Min: 97 8 °F (36 6 °C)  Max: 97 9 °F (36 6 °C)        There is no height or weight on file to calculate BMI  N/A    Laboratory and Diagnostics:  Results from last 7 days   Lab Units 09/08/22  1631   WBC Thousand/uL 4 56   HEMOGLOBIN g/dL 12 1   HEMATOCRIT % 37 2   PLATELETS Thousands/uL 79*   NEUTROS PCT % 51   MONOS PCT % 13*     Results from last 7 days   Lab Units 09/08/22  1631   SODIUM mmol/L 143   POTASSIUM mmol/L 3 7   CHLORIDE mmol/L 108   CO2 mmol/L 30   ANION GAP mmol/L 5   BUN mg/dL 19   CREATININE mg/dL 1 20   CALCIUM mg/dL 10 1   GLUCOSE RANDOM mg/dL 103          Results from last 7 days   Lab Units 09/08/22  1631   INR  1 55*   PTT seconds 36              ABG:    VBG:          Micro:        EKG:   Imaging: I have personally reviewed pertinent reports        Historical Information   Past Medical History:   Diagnosis Date    Disease of thyroid gland     Hypertension      Past Surgical History:   Procedure Laterality Date    EYE SURGERY      CA RECONSTR TOTAL SHOULDER IMPLANT Left 12/10/2018    Procedure: LEFT REVERSE TOTAL SHOULDER ARTHROPLASTY;  Surgeon: Ludin Romero MD;  [facility];  Service: Orthopedics     Social History Social History     Substance and Sexual Activity   Alcohol Use No     Social History     Substance and Sexual Activity   Drug Use No     Social History     Tobacco Use   Smoking Status Never Smoker   Smokeless Tobacco Never Used     Exercise History:   Family History:   Family History   Problem Relation Age of Onset    No Known Problems Mother     No Known Problems Father      Family history unknown and I have reviewed this patient's family history and commented on sigificant items within the HPI      Medications:  No current facility-administered medications for this encounter  Home medications:  Prior to Admission Medications   Prescriptions Last Dose Informant Patient Reported? Taking?    Bisacodyl (DULCOLAX RE)   Yes No   Sig: Insert into the rectum   Cholecalciferol (VITAMIN D3) 1000 units CAPS  Self Yes No   Sig: Daily   Loperamide HCl (LOPERAMIDE A-D PO)   Yes No   Sig: Take 1 tablet by mouth every 6 (six) hours as needed   Melatonin 5 MG TABS   Yes No   Sig: Take 1 tablet by mouth daily at bedtime   Menthol-Methyl Salicylate (ALEXANDRO ARGUELLO GREASELESS) 10-15 % greaseless cream   No No   Sig: Applied to affected area QID as needed   Potassium 99 MG TABS  Self Yes No   Sig: Take 99 mg by mouth    acetaminophen (TYLENOL) 325 mg tablet   Yes No   Sig: Take 650 mg by mouth every 6 (six) hours as needed for mild pain   atorvastatin (LIPITOR) 80 mg tablet   Yes No   Sig: Take 80 mg by mouth daily   calcium carbonate-vitamin D (OSCAL-D) 500 mg-200 units per tablet   Yes No   Sig: Take 1 tablet by mouth daily with breakfast   clopidogrel (PLAVIX) 75 mg tablet   Yes No   Sig: Take 75 mg by mouth daily   fluticasone (FLONASE) 50 mcg/act nasal spray   Yes No   Si spray into each nostril daily   levothyroxine 25 mcg tablet  Self Yes No   Sig: Take 25 mcg by mouth daily    magnesium hydroxide (MILK OF MAGNESIA) 400 mg/5 mL oral suspension   Yes No   Sig: Take by mouth daily as needed for constipation   propranolol (INDERAL LA) 160 mg  Self Yes No   Sig: 160 mg daily     sertraline (ZOLOFT) 25 mg tablet   Yes No   Sig: Take 50 mg by mouth daily    traZODone (DESYREL) 50 mg tablet   Yes No   Sig: Take 50 mg by mouth daily at bedtime as needed for sleep   vitamin B-12 (VITAMIN B-12) 1,000 mcg tablet   Yes No   Sig: Take by mouth daily      Facility-Administered Medications: None     Allergies:  No Known Allergies  ------------------------------------------------------------------------------------------------------------  Advance Directive and Living Will:      Power of :    POLST:    ------------------------------------------------------------------------------------------------------------  Anticipated Length of Stay is > 2 midnights    Care Time Delivered:   No Critical Care time spent       Niyah Tita, DO        Portions of the record may have been created with voice recognition software  Occasional wrong word or "sound a like" substitutions may have occurred due to the inherent limitations of voice recognition software    Read the chart carefully and recognize, using context, where substitutions have occurred

## 2022-09-08 NOTE — EMTALA/ACUTE CARE TRANSFER
[facility] [address]  [address] [facility] [address]  Dept: [phone]      EMTALA TRANSFER CONSENT    NAME Pennie Sneed                                         1926                              MRN [id number]    I have been informed of my rights regarding examination, treatment, and transfer   by Dr Aram Corea DO    Benefits: Specialized equipment and/or services available at the receiving facility (Include comment)________________________    Risks: Potential for delay in receiving treatment, Potential deterioration of medical condition, Loss of IV, Increased discomfort during transfer, Possible worsening of condition or death during transfer      Consent for Transfer:  I acknowledge that my medical condition has been evaluated and explained to me by the emergency department physician or other qualified medical person and/or my attending physician, who has recommended that I be transferred to the service of  Accepting Physician: Dr Sreekanth Zhou at [address] [facility] Name, [address] : [facility]  The above potential benefits of such transfer, the potential risks associated with such transfer, and the probable risks of not being transferred have been explained to me, and I fully understand them  The doctor has explained that, in my case, the benefits of transfer outweigh the risks  I agree to be transferred  I authorize the performance of emergency medical procedures and treatments upon me in both transit and upon arrival at the receiving facility  Additionally, I authorize the release of any and all medical records to the receiving facility and request they be transported with me, if possible  I understand that the safest mode of transportation during a medical emergency is an ambulance and that the Hospital advocates the use of this mode of transport   Risks of traveling to the receiving facility by car, including absence of medical control, life sustaining equipment, such as oxygen, and medical personnel has been explained to me and I fully understand them  (TRINIDAD CORRECT BOX BELOW)  [  ]  I consent to the stated transfer and to be transported by ambulance/helicopter  [  ]  I consent to the stated transfer, but refuse transportation by ambulance and accept full responsibility for my transportation by car  I understand the risks of non-ambulance transfers and I exonerate the Hospital and its staff from any deterioration in my condition that results from this refusal     X___________________________________________    DATE  22  TIME________  Signature of patient or legally responsible individual signing on patient behalf           RELATIONSHIP TO PATIENT_________________________          Provider Certification    NAME Issa Allen                                         1926                              MRN [id number]    A medical screening exam was performed on the above named patient  Based on the examination:    Condition Necessitating Transfer The primary encounter diagnosis was Acute intracerebral hemorrhage ([facility] [address] )  Diagnoses of Closed nondisplaced simple supracondylar fracture of left humerus without intercondylar fracture, initial encounter and Closed odontoid fracture, initial encounter [facility]) were also pertinent to this visit      Patient Condition: The patient has been stabilized such that within reasonable medical probability, no material deterioration of the patient condition or the condition of the unborn child(ankush) is likely to result from the transfer    Reason for Transfer: Level of Care needed not available at this facility    Transfer Requirements: Facility [facility]   · Space available and qualified personnel available for treatment as acknowledged by Wade Owens RN  · Agreed to accept transfer and to provide appropriate medical treatment as acknowledged by       Dr Ivan Cruz  · Appropriate medical records of the examination and treatment of the patient are provided at the time of transfer   [address] [facility], [address] _______  · Transfer will be performed by qualified personnel from    and appropriate transfer equipment as required, including the use of necessary and appropriate life support measures  Provider Certification: I have examined the patient and explained the following risks and benefits of being transferred/refusing transfer to the patient/family:  General risk, such as traffic hazards, adverse weather conditions, rough terrain or turbulence, possible failure of equipment (including vehicle or aircraft), or consequences of actions of persons outside the control of the transport personnel, Unanticipated needs of medical equipment and personnel during transport, The possibility of a transport vehicle being unavailable, Risk of worsening condition      Based on these reasonable risks and benefits to the patient and/or the unborn child(ankush), and based upon the information available at the time of the patients examination, I certify that the medical benefits reasonably to be expected from the provision of appropriate medical treatments at another medical facility outweigh the increasing risks, if any, to the individuals medical condition, and in the case of labor to the unborn child, from effecting the transfer      X____________________________________________ DATE 09/08/22        TIME_______      ORIGINAL - SEND TO MEDICAL RECORDS   COPY - SEND WITH PATIENT DURING TRANSFER

## 2022-09-08 NOTE — ASSESSMENT & PLAN NOTE
9/8 CT cervical spine: New acute C2 fracture involving base of dens      Plan:  - consult neurosurgery  - cervical collar in place

## 2022-09-09 NOTE — PHYSICAL THERAPY NOTE
PHYSICAL THERAPY EVALUATION  NAME:  Christian Sears  DATE: 09/09/22    AGE:   80 y o  Mrn:   [id number]  ADMIT DX:  Dens fracture ([facility] [address] ) [F45 444X]  Subdural hematoma, acute ([facility] [address] ) [D74 9I6M]  Fall, initial encounter [W19  XXXA]  Unspecified multiple injuries, initial encounter [T07  XXXA]  Periprosthetic fracture of shoulder [id number]  9XXA]  Late onset Alzheimer's dementia without behavioral disturbance (HCC) [G30 1, F02 80]    Past Medical History:   Diagnosis Date    Disease of thyroid gland     Hypertension      Past Surgical History:   Procedure Laterality Date    EYE SURGERY      KS RECONSTR TOTAL SHOULDER IMPLANT Left 12/10/2018    Procedure: LEFT REVERSE TOTAL SHOULDER ARTHROPLASTY;  Surgeon: Danielle Willett MD;  Location: [facility];  Service: Orthopedics       Length Of Stay: 1  PHYSICAL THERAPY EVALUATION :    09/09/22 1021   PT Last Visit   PT Visit Date 09/09/22   Note Type   Note type Evaluation   Pain Assessment   Pain Assessment Tool FLACC   Pain Rating: FLACC (Rest) - Face 0   Pain Rating: FLACC (Rest) - Legs 0   Pain Rating: FLACC (Rest) - Activity 0   Pain Rating: FLACC (Rest) - Cry 0   Pain Rating: FLACC (Rest) - Consolability 0   Score: FLACC (Rest) 0   Pain Rating: FLACC (Activity) - Face 0   Pain Rating: FLACC (Activity) - Legs 0   Pain Rating: FLACC (Activity) - Activity 0   Pain Rating: FLACC (Activity) - Cry 0   Pain Rating: FLACC (Activity) - Consolability 0   Score: FLACC (Activity) 0   Restrictions/Precautions   Weight Bearing Precautions Per Order Yes   RUE Weight Bearing Per Order FWB   LUE Weight Bearing Per Order (S)  NWB  (in sling)   RLE Weight Bearing Per Order FWB   LLE Weight Bearing Per Order FWB   Braces or Orthoses (S)  C/S Collar   Other Precautions Spinal precautions;Cognitive; Chair Alarm; Bed Alarm;Aspiration;Multiple lines; Fall Risk   Home Living   Type of Home Assisted living  ([facility] [address] [facility] level  (0 FABIO)   Bathroom Shower/Tub Walk-in shower   Bathroom Equipment Grab bars in shower;Grab bars around toilet; Shower chair   Home Equipment Walker   Additional Comments [facility] in a FF apt w/ 0 FABIO; walk in shower w/ grab bars ; shower chair ; 0 drive; and received A w/ all ADL"s IADL's and was ambulating w/ RW; but has had increasing difficulty and recent increase in falls (3-4) in past 6 months  Prior Function   Level of [place] Needs assistance with ADLs and functional mobility; Needs assistance with IADLs   Lives With Facility staff   Receives Help From Personal care attendant   ADL Assistance Needs assistance   IADLs Needs assistance   Falls in the last 6 months 1 to 4  (3-4)   Comments was ambualtory in room and in facility w/ RW per report   General   Family/Caregiver Present No   Cognition   Overall Cognitive Status Impaired   Arousal/Participation Alert   Orientation Level Unable to assess  (non verbal)   Comments nonverbal- eyey open and would make occasional eye contact w/ PT- however did not verbalize or follow any commands throughout session   RUE Assessment   RUE Assessment WFL  (passively WFL- spontanous movemetn throughout noted-)   LUE Assessment   LUE Assessment X  (in sling- moves fingers spontaneously at times)   RLE Assessment   RLE Assessment WFL  (2+-3/5 grossly- noted by spontaneous movements throughout- pt did not follow any commands for MMT)   LLE Assessment   LLE Assessment WFL   Coordination   Movements are Fluid and Coordinated 0   Light Touch   RLE Light Touch Grossly intact   LLE Light Touch Grossly intact   Sharp/Dull   RLE Sharp/Dull Grossly intact   LLE Sharp/Dull Grossly intact   Bed Mobility   Supine to Sit 2  Maximal assistance   Additional items Assist x 1; Increased time required;LE management  (trunk management)   Additional Comments OOB to recliner w/ chair alrm intact L UE supported and trunk supported w/ green wedges   Transfers   Sit to Stand 2  Maximal assistance   Additional items Assist x 2   Stand to Sit 2  Maximal assistance   Additional items Assist x 2   Stand pivot 2  Maximal assistance  (knee block)   Ambulation/Elevation   Gait pattern Not appropriate   Balance   Static Sitting Poor   Dynamic Standing Poor -   Ambulatory Zero   Endurance Deficit   Endurance Deficit Yes   Activity Tolerance   Activity Tolerance Patient limited by fatigue   Medical Staff Made Aware Pt was seen in conjunction w/ OT 2* unstable presentation; medical complexity; poly- traumatic injuries; new precautions/ limitations + limited activity tolerance and regression from baseline functional mobility   Nurse Made Aware yes- cleared for session   Assessment  Pt is 80 y o  female seen for PT evaluation s/p admit to [facility] on 9/8/2022 as a xfer from [facility]    Pt presenting s/p fall from standign w/ headstrike; on anticoagulation  Current dx/ problem list includes: C2 fx; R SDH; and L periprosthetic humeral fx   Orders for NWB L UE in sling; C/S precautions + C/S collar   has a past medical history of Disease of thyroid gland and Hypertension  dementia;  L reverse TSA   Due to acute medical issues, cognition; C/S precautions; C/S collar; L UE NWB ongoing medical workup for primary dx; pain, fall risk, decreased activity tolerance compared to baseline, increased assistance needed from caregiver at current time, continuous  monitoring, multiple lines, decline in overall functional mobility status; health management issues; incontinence; note unstable clinical picture (high complexity)   Prior to admission, pt was residing at [facility] facility in a FF apt w/ 0 FABIO; walk in shower w/ grab bars ; shower chair ; 0 drive; and received A w/ all ADL"s IADL's and was ambulating w/ RW; but has had increasing difficulty and recent increase in falls (3-4) in past 6 months    Currently pt  is requiring maxA x2 for bed skills; functional transfer via stand pivot to chair and was unable to advance LE's for functional gait  Pt was non verbal throughout session; did not follow commands; but was awake and did make occasional eye contact w/ PT  has stable vitals and did not appear to be in any pain  PT to see in next few session if able for gait progress and updated goals as able however would ideally recommend w/c level/ xfers only for safety and for fall prevention moving forward  Pt presents functioning below her baseline and currently w/ overall mobility deficits 2* to: nonverbal; cognitive deficits;  Ortho restrictions; NWB LUE; C/S precautions + C/S collar ;  decreased LE strength/AROM; limited flexibility;   generalized weakness/ deconditioning; decreased endurance; decreased activity tolerance; decreased coordination; impaired balance; decreased safety awareness;  impaired safety and judgement; limited insight into current deficits; bed/ chair alarms; multiple lines incontinence  (Please find additional objective findings from PT assessment regarding body systems outlined above ) Pt will continue to benefit from skilled PT interventions to address stated impairments; to maximize functional potential; for ongoing pt/ family training; and DME needs  PT is currently recommending d/c to inpatient rehab setting when medically cleared for safety and to maximize functional potential     Prognosis Fair   Problem List Decreased strength;Decreased range of motion;Decreased endurance; Impaired balance;Decreased mobility; Decreased coordination;Decreased cognition; Impaired judgement;Decreased safety awareness;Decreased skin integrity;Orthopedic restrictions;Pain; Impaired vision; Impaired hearing   Goals   Patient Goals none stated   STG Expiration Date 09/23/22   Short Term Goal #1 In 14 days pt will complete: 1) Bed mobility skills with modAx1 to facilitate safe return to previous living environment and decrease burden on caregivers   2) Functional transfers with modAx1  to facilitate safe return to previous living environment  3)) Improve seated  balance by 1 grade in order to decrease fall risk  4) improve activity tolerance to 25 mins for assist w/ caregiver burden/ ADL's   5) Improve LE strength grades by 1 to increase independence w/ all functional mobility, transfers and gait  6) PT for ongoing pt and family education; DME needs and D/C planning to promote highest level of function in least restrictive environment  Plan   Treatment/Interventions ADL retraining;Functional transfer training;LE strengthening/ROM; Elevations; Therapeutic exercise; Endurance training;Cognitive reorientation;Patient/family training;Equipment eval/education; Bed mobility;Spoke to nursing;Spoke to case management; Compensatory technique education;Continued evaluation;Spoke to advanced practitioner;OT   PT Frequency 2-3x/wk   Recommendation   PT Discharge Recommendation Post acute rehabilitation services   Equipment Recommended Wheelchair   Wheelchair Package Recommended Standard   AM-PAC Basic Mobility Inpatient   Turning in Bed Without Bedrails 1   Lying on Back to Sitting on Edge of Flat Bed 1   Moving Bed to Chair 1   Standing Up From Chair 1   Walk in Room 1   Climb 3-5 Stairs 1   Basic Mobility Inpatient Raw Score 6   Turning Head Towards Sound 2   Follow Simple Instructions 1   Low Function Basic Mobility Raw Score 9   Low Function Basic Mobility Standardized Score 12 55   Highest Level Of Mobility   JH-HLM Goal 2: Bed activities/Dependent transfer   JH-HLM Achieved 4: Move to chair/commode   Barthel Index   Feeding 0   Bathing 0   Grooming Score 0   Dressing Score 0   Bladder Score 0   Bowels Score 0   Toilet Use Score 0   Transfers (Bed/Chair) Score 5   Mobility (Level Surface) Score 0   Stairs Score 0   Barthel Index Score 5   End of Consult   Patient Position at End of Consult Bedside chair;Bed/Chair alarm activated; All needs within reach

## 2022-09-09 NOTE — NURSING NOTE
Arrived to ICU from ED via stretcher  Patient moves all extremities, is nonverbal but moans to pain  Unable to vocalize words when asked, does not follow commands   Pupils are extremly

## 2022-09-09 NOTE — CONSULTS
On-Call Telephone Note    Contacted by Serena Anderson 80 y o  female MRN: [id number]  Unit/Bed#: ED 18 Encounter: [id number]    Per provider report, patient presents with SDH and C2 fx  Available past medical history,social history, surgical history, medication list, drug allergies and review of systems were reviewed  /78 (BP Location: Right arm)   Pulse 72   Temp 97 8 °F (36 6 °C) (Oral)   Resp 16   SpO2 95%      Clinical exam per provider report, nonverbal at baseline, otherwise nonfocal     Imaging personally reviewed  Reports reviewed  There is ICH, SDH, atrophy, microvascular dz, C2 fx  Right frontal SDH now extends holohemispheric and has increased  There is mild Right perimesencephalic cistern abutment but ample space elsewhere    Assessment and Plan  1  Agree with Keppra x 1wk then d/c as long as she has no seizure activity, holding Plavix for 48 hours and until CT shows SDH stable  2  Agree with plan to manage C2 fracture definitively in cervical collar  3  Unlikely to warrant neurosurgical intervention for her intracranial or intraspinal processes given her baseline neurologic status and age  Discussions to ensue with family    All questions answered  Provider is in agreement with the course of action  A total of 30 minutes was spent discussing the presentation, physical exam and formulating a management plan with the provider

## 2022-09-09 NOTE — PLAN OF CARE
Problem: MOBILITY - ADULT  Goal: Maintain or return to baseline ADL function  Description: INTERVENTIONS:  -  Assess patient's ability to carry out ADLs; assess patient's baseline for ADL function and identify physical deficits which impact ability to perform ADLs (bathing, care of mouth/teeth, toileting, grooming, dressing, etc )  - Assess/evaluate cause of self-care deficits   - Assess range of motion  - Assess patient's mobility; develop plan if impaired  - Assess patient's need for assistive devices and provide as appropriate  - Encourage maximum independence but intervene and supervise when necessary  - Involve family in performance of ADLs  - Assess for home care needs following discharge   - Consider OT consult to assist with ADL evaluation and planning for discharge  - Provide patient education as appropriate  Outcome: Progressing  Goal: Maintains/Returns to pre admission functional level  Description: INTERVENTIONS:  - Perform BMAT or MOVE assessment daily    - Set and communicate daily mobility goal to care team and patient/family/caregiver     - Collaborate with rehabilitation services on mobility goals if consulted  - Out of bed for toileting  - Record patient progress and toleration of activity level   Outcome: Progressing     Problem: SAFETY,RESTRAINT: NV/NON-SELF DESTRUCTIVE BEHAVIOR  Goal: Remains free of harm/injury (restraint for non violent/non self-detsructive behavior)  Description: INTERVENTIONS:  - Instruct patient/family regarding restraint use   - Assess and monitor physiologic and psychological status   - Provide interventions and comfort measures to meet assessed patient needs   - Identify and implement measures to help patient regain control  - Assess readiness for release of restraint   Outcome: Progressing  Goal: Returns to optimal restraint-free functioning  Description: INTERVENTIONS:  - Assess the patient's behavior and symptoms that indicate continued need for restraint  - Identify and implement measures to help patient regain control  - Assess readiness for release of restraint   Outcome: Progressing     Problem: PAIN - ADULT  Goal: Verbalizes/displays adequate comfort level or baseline comfort level  Description: Interventions:  - Encourage patient to monitor pain and request assistance  - Assess pain using appropriate pain scale  - Administer analgesics based on type and severity of pain and evaluate response  - Implement non-pharmacological measures as appropriate and evaluate response  - Consider cultural and social influences on pain and pain management  - Notify physician/advanced practitioner if interventions unsuccessful or patient reports new pain  Outcome: Progressing     Problem: INFECTION - ADULT  Goal: Absence or prevention of progression during hospitalization  Description: INTERVENTIONS:  - Assess and monitor for signs and symptoms of infection  - Monitor lab/diagnostic results  - Monitor all insertion sites, i e  indwelling lines, tubes, and drains  - Monitor endotracheal if appropriate and nasal secretions for changes in amount and color  - [place] appropriate cooling/warming therapies per order  - Administer medications as ordered  - Instruct and encourage patient and family to use good hand hygiene technique  - Identify and instruct in appropriate isolation precautions for identified infection/condition  Outcome: Progressing  Goal: Absence of fever/infection during neutropenic period  Description: INTERVENTIONS:  - Monitor WBC    Outcome: Progressing     Problem: SAFETY ADULT  Goal: Maintain or return to baseline ADL function  Description: INTERVENTIONS:  -  Assess patient's ability to carry out ADLs; assess patient's baseline for ADL function and identify physical deficits which impact ability to perform ADLs (bathing, care of mouth/teeth, toileting, grooming, dressing, etc )  - Assess/evaluate cause of self-care deficits   - Assess range of motion  - Assess patient's mobility; develop plan if impaired  - Assess patient's need for assistive devices and provide as appropriate  - Encourage maximum independence but intervene and supervise when necessary  - Involve family in performance of ADLs  - Assess for home care needs following discharge   - Consider OT consult to assist with ADL evaluation and planning for discharge  - Provide patient education as appropriate  Outcome: Progressing  Goal: Maintains/Returns to pre admission functional level  Description: INTERVENTIONS:  - Perform BMAT or MOVE assessment daily    - Set and communicate daily mobility goal to care team and patient/family/caregiver     - Collaborate with rehabilitation services on mobility goals if consulted  - Out of bed for toileting  - Record patient progress and toleration of activity level   Outcome: Progressing  Goal: Patient will remain free of falls  Description: INTERVENTIONS:  - Educate patient/family on patient safety including physical limitations  - Instruct patient to call for assistance with activity   - Consult OT/PT to assist with strengthening/mobility   - Keep Call bell within reach  - Keep bed low and locked with side rails adjusted as appropriate  - Keep care items and personal belongings within reach  - Initiate and maintain comfort rounds  - Make Fall Risk Sign visible to staff  - Apply yellow socks and bracelet for high fall risk patients  - Consider moving patient to room near nurses station  Outcome: Progressing     Problem: DISCHARGE PLANNING  Goal: Discharge to home or other facility with appropriate resources  Description: INTERVENTIONS:  - Identify barriers to discharge w/patient and caregiver  - Arrange for needed discharge resources and transportation as appropriate  - Identify discharge learning needs (meds, wound care, etc )  - Arrange for interpretive services to assist at discharge as needed  - Refer to Case Management Department for coordinating discharge planning if the patient needs post-hospital services based on physician/advanced practitioner order or complex needs related to functional status, cognitive ability, or social support system  Outcome: Progressing     Problem: Knowledge Deficit  Goal: Patient/family/caregiver demonstrates understanding of disease process, treatment plan, medications, and discharge instructions  Description: Complete learning assessment and assess knowledge base    Interventions:  - Provide teaching at level of understanding  - Provide teaching via preferred learning methods  Outcome: Progressing     Problem: Potential for Falls  Goal: Patient will remain free of falls  Description: INTERVENTIONS:  - Educate patient/family on patient safety including physical limitations  - Instruct patient to call for assistance with activity   - Consult OT/PT to assist with strengthening/mobility   - Keep Call bell within reach  - Keep bed low and locked with side rails adjusted as appropriate  - Keep care items and personal belongings within reach  - Initiate and maintain comfort rounds  - Make Fall Risk Sign visible to staff  - Apply yellow socks and bracelet for high fall risk patients  - Consider moving patient to room near nurses station  Outcome: Progressing     Problem: Prexisting or High Potential for Compromised Skin Integrity  Goal: Skin integrity is maintained or improved  Description: INTERVENTIONS:  - Identify patients at risk for skin breakdown  - Assess and monitor skin integrity  - Assess and monitor nutrition and hydration status  - Monitor labs   - Assess for incontinence   - Turn and reposition patient  - Assist with mobility/ambulation  - Relieve pressure over bony prominences  - Avoid friction and shearing  - Provide appropriate hygiene as needed including keeping skin clean and dry  - Evaluate need for skin moisturizer/barrier cream  - Collaborate with interdisciplinary team   - Patient/family teaching  - Consider wound care consult   Outcome: Progressing     Problem: Nutrition/Hydration-ADULT  Goal: Nutrient/Hydration intake appropriate for improving, restoring or maintaining nutritional needs  Description: Monitor and assess patient's nutrition/hydration status for malnutrition  Collaborate with interdisciplinary team and initiate plan and interventions as ordered  Monitor patient's weight and dietary intake as ordered or per policy  Utilize nutrition screening tool and intervene as necessary  Determine patient's food preferences and provide high-protein, high-caloric foods as appropriate       INTERVENTIONS:  - Monitor oral intake, urinary output, labs, and treatment plans  - Assess nutrition and hydration status and recommend course of action  - Evaluate amount of meals eaten  - Assist patient with eating if necessary   - Allow adequate time for meals  - Recommend/ encourage appropriate diets, oral nutritional supplements, and vitamin/mineral supplements  - Order, calculate, and assess calorie counts as needed  - Recommend, monitor, and adjust tube feedings and TPN/PPN based on assessed needs  - Assess need for intravenous fluids  - Provide specific nutrition/hydration education as appropriate  - Include patient/family/caregiver in decisions related to nutrition  Outcome: Progressing

## 2022-09-09 NOTE — PLAN OF CARE
Problem: PHYSICAL THERAPY ADULT  Goal: Performs mobility at highest level of function for planned discharge setting  See evaluation for individualized goals  Description: Treatment/Interventions: ADL retraining, Functional transfer training, LE strengthening/ROM, Elevations, Therapeutic exercise, Endurance training, Cognitive reorientation, Patient/family training, Equipment eval/education, Bed mobility, Spoke to nursing, Spoke to case management, Compensatory technique education, Continued evaluation, Spoke to advanced practitioner, OT  Equipment Recommended: Wheelchair       See flowsheet documentation for full assessment, interventions and recommendations  Note: Prognosis: Fair  Problem List: Decreased strength, Decreased range of motion, Decreased endurance, Impaired balance, Decreased mobility, Decreased coordination, Decreased cognition, Impaired judgement, Decreased safety awareness, Decreased skin integrity, Orthopedic restrictions, Pain, Impaired vision, Impaired hearing           PT Discharge Recommendation: Post acute rehabilitation services    See flowsheet documentation for full assessment  Problem: PHYSICAL THERAPY ADULT  Goal: Performs mobility at highest level of function for planned discharge setting  See evaluation for individualized goals  Description: Treatment/Interventions: ADL retraining, Functional transfer training, LE strengthening/ROM, Elevations, Therapeutic exercise, Endurance training, Cognitive reorientation, Patient/family training, Equipment eval/education, Bed mobility, Spoke to nursing, Spoke to case management, Compensatory technique education, Continued evaluation, Spoke to advanced practitioner, OT  Equipment Recommended: Wheelchair       See flowsheet documentation for full assessment, interventions and recommendations    Note: Prognosis: Fair  Problem List: Decreased strength, Decreased range of motion, Decreased endurance, Impaired balance, Decreased mobility, Decreased coordination, Decreased cognition, Impaired judgement, Decreased safety awareness, Decreased skin integrity, Orthopedic restrictions, Pain, Impaired vision, Impaired hearing      Pt is 80 y o  female seen for PT evaluation s/p admit to [facility] on 9/8/2022 as a xfer from [facility]    Pt presenting s/p fall from standign w/ headstrike; on anticoagulation  Current dx/ problem list includes: C2 fx; R SDH; and L periprosthetic humeral fx   Orders for NWB L UE in sling; C/S precautions + C/S collar   has a past medical history of Disease of thyroid gland and Hypertension  dementia;  L reverse TSA   Due to acute medical issues, cognition; C/S precautions; C/S collar; L UE NWB ongoing medical workup for primary dx; pain, fall risk, decreased activity tolerance compared to baseline, increased assistance needed from caregiver at current time, continuous  monitoring, multiple lines, decline in overall functional mobility status; health management issues; incontinence; note unstable clinical picture (high complexity)   Prior to admission, pt was residing at [facility] [place] facility in a FF apt w/ 0 FABIO; walk in shower w/ grab bars ; shower chair ; 0 drive; and received A w/ all ADL"s IADL's and was ambulating w/ RW; but has had increasing difficulty and recent increase in falls (3-4) in past 6 months  Currently pt  is requiring maxA x2 for bed skills; functional transfer via stand pivot to chair and was unable to advance LE's for functional gait  Pt was non verbal throughout session; did not follow commands; but was awake and did make occasional eye contact w/ PT  has stable vitals and did not appear to be in any pain  PT to see in next few session if able for gait progress and updated goals as able however would ideally recommend w/c level/ xfers only for safety and for fall prevention moving forward    Pt presents functioning below her baseline and currently w/ overall mobility deficits 2* to: nonverbal; cognitive deficits;  Ortho restrictions; NWB LUE; C/S precautions + C/S collar ;  decreased LE strength/AROM; limited flexibility;   generalized weakness/ deconditioning; decreased endurance; decreased activity tolerance; decreased coordination; impaired balance; decreased safety awareness;  impaired safety and judgement; limited insight into current deficits; bed/ chair alarms; multiple lines incontinence  (Please find additional objective findings from PT assessment regarding body systems outlined above ) Pt will continue to benefit from skilled PT interventions to address stated impairments; to maximize functional potential; for ongoing pt/ family training; and DME needs  PT is currently recommending d/c to inpatient rehab setting when medically cleared for safety and to maximize functional potential       PT Discharge Recommendation: Post acute rehabilitation services    See flowsheet documentation for full assessment

## 2022-09-09 NOTE — ASSESSMENT & PLAN NOTE
9/8 CT Head: 3 0 cm acute mixed-density hemorrhagic brain contusion of right anterior-inferior frontal lobe with moderate surrounding vasogenic edema, 1 0 cm thick acute subdural hematoma along right frontal cerebral convexity with some extension of blood products into the right parafalcine and right tentorium cerebelli, Trace intraventricular hemorrhage in the occipital horn of left lateral ventricle  Plan:  - neurosurgery consulted  - CTH head this am with worsening bleeding   - Discussed with NSGY and family  - Discussed plan with family- Dtr-in law and Son, Other Dtr has been in contact with Family and they all agree that they would like to pursue comfort care  They did have a conversation with the patient when she was cognitively intact regarding goals of care and this is consistent with her wishes

## 2022-09-09 NOTE — ASSESSMENT & PLAN NOTE
Lab Results   Component Value Date    EGFR 43 09/08/2022    EGFR 38 09/08/2022    EGFR 39 04/21/2021    CREATININE 1 07 09/08/2022    CREATININE 1 20 09/08/2022    CREATININE 1 19 04/21/2021     Monitor BUN/Cr

## 2022-09-09 NOTE — DISCHARGE INSTR - OTHER ORDERS
Skin Care Plan:  1-Cleanse sacro-buttocks with soap and water  Apply Hydraguard BID and PRN  2-Turn/reposition q2h or when medically stable for pressure re-distribution on skin   3-Elevate heels to offload pressure  4-Moisturize skin daily with skin nourishing cream  5-Ehob cushion in chair when out of bed  6-Hydraguard to bilateral heels BID and PRN

## 2022-09-09 NOTE — PROGRESS NOTES
Progress Note - Orthopedics   Dorie Saxena 80 y o  female MRN: [id number]  Unit/Bed#: Cat Scan      Subjective:    80 y  o female with left rTSA nondisplaced periprosthetic fracture  No acute events, no complaints  Pt doing well  Pain controlled  Denies fevers chills, CP, SOB    Labs:  0   Lab Value Date/Time    HCT 31 8 (L) 09/08/2022 2049    HCT 37 2 09/08/2022 1631    HCT 41 9 04/21/2021 1918    HGB 10 6 (L) 09/08/2022 2049    HGB 12 1 09/08/2022 1631    HGB 13 8 04/21/2021 1918    INR 1 72 (H) 09/08/2022 2049    WBC 5 42 09/08/2022 2049    WBC 4 56 09/08/2022 1631    WBC 6 35 04/21/2021 1918    CRP 14 4 (H) 11/30/2018 1050       Meds:    Current Facility-Administered Medications:     acetaminophen (TYLENOL) tablet 650 mg, 650 mg, Oral, Q6H PRN, Dianne Cabrera DO    atorvastatin (LIPITOR) tablet 80 mg, 80 mg, Oral, Daily With Dinner, Dianne Cabrera DO    levETIRAcetam (KEPPRA) 500 mg in sodium chloride 0 9 % 100 mL IVPB, 500 mg, Intravenous, Q12H [address], Dianne Cabrera DO, Stopped at 09/08/22 2225    levothyroxine tablet 25 mcg, 25 mcg, Oral, Early Morning, Dianne Cabrera DO    melatonin tablet 6 mg, 6 mg, Oral, HS, Dianne Cabrera DO    multi-electrolyte (PLASMALYTE-A/ISOLYTE-S PH 7 4) IV solution, 40 mL/hr, Intravenous, Continuous, Dianne Cabrera DO, Last Rate: 40 mL/hr at 09/09/22 0322, 40 mL/hr at 09/09/22 0322    oxyCODONE-acetaminophen (PERCOCET) 5-325 mg per tablet 1 tablet, 1 tablet, Oral, Q6H PRN, Dianne Cabrera DO    propranolol (INDERAL LA) 24 hr capsule 160 mg, 160 mg, Oral, Daily, Dianne Cabrera DO    Blood Culture:   Lab Results   Component Value Date    BLOODCX No Growth After 5 Days  09/22/2019       Wound Culture:   No results found for: WOUNDCULT    Ins and Outs:  I/O last 24 hours:   In: 778 5 [I V :678 5; IV Piggyback:100]  Out: -           Physical:  Vitals:    09/09/22 0825   BP: 158/94   Pulse: 64   Resp: 18   Temp: 98 8 °F (37 1 °C)   SpO2: 98%     Musculoskeletal: left Upper Extremity  · Skin intact, ecchymosis and swelling over shoulder proximal humerus  · Minimal tenderness to palpation over the shoulder and upper arm   · Sensation intact to the radial, ulnar, median, musculocutaneous, axillary nerve distributions   · Patient unable to comply with motor exam due to dementia   · Patient demonstrating ability to abduct shoulder, flex and extend all fingers, flex and extend wrist during course of examination   · 2+ radial pulse   · Musculature soft compressible, no pain with passive stretch      Assessment:    89 y  o female  with left rTSA nondisplaced periprosthetic fracture  Patient can follow up with Dr Edward Bain in 2 weeks       Plan:  · NWB LUE in sling  · PT/OT  · Pain control  · DVT ppx  · Dispo: [id number] [address] Hernández for discharge from [address], [place]

## 2022-09-09 NOTE — PROGRESS NOTES
[address] [facility]  Progress Note John Quinonez 8/29/1926, 80 y o  female MRN: [id number]  Unit/Bed#: [facility] 621-01 Encounter: [id number]  Primary Care Provider: Alice Johnson DO   Date and time admitted to hospital: 9/8/2022  6:49 PM    Dens fracture [facility])  Assessment & Plan  9/8 CT cervical spine: New acute C2 fracture involving base of dens  Plan:  - consult neurosurgery  - cervical collar in place    Periprosthetic fracture of shoulder  Assessment & Plan  9/8 Xray left humerus: Findings indeterminate for a small nondisplaced periprosthetic fracture along the proximal aspect of the humerus  Plan:  - ortho consult  - maintain sling    Fall  Assessment & Plan  Witnessed fall  Struck her head on dining table  On plavix    Dementia ([facility] [address] )  Assessment & Plan  At baseline  Unable to provide history  - Patients family states she has been declining over the last few years  CKD (chronic kidney disease) stage 2, GFR 60-89 ml/min  Assessment & Plan  Lab Results   Component Value Date    EGFR 42 09/09/2022    EGFR 43 09/08/2022    EGFR 38 09/08/2022    CREATININE 1 10 09/09/2022    CREATININE 1 07 09/08/2022    CREATININE 1 20 09/08/2022     Monitor BUN/Cr    Essential hypertension  Assessment & Plan  Propanolol 160mg PO  Goal SBP < 140  * Subdural hematoma, acute [facility])  Assessment & Plan  9/8 CT Head: 3 0 cm acute mixed-density hemorrhagic brain contusion of right anterior-inferior frontal lobe with moderate surrounding vasogenic edema, 1 0 cm thick acute subdural hematoma along right frontal cerebral convexity with some extension of blood products into the right parafalcine and right tentorium cerebelli, Trace intraventricular hemorrhage in the occipital horn of left lateral ventricle  Plan:  - neurosurgery consulted  - CTH head this am with worsening bleeding   - Discussed with NSGY and family    - Discussed plan with family- Dtr-in law and Son, Other Dtr has been in contact with Family and they all agree that they would like to pursue comfort care  They did have a conversation with the patient when she was cognitively intact regarding goals of care and this is consistent with her wishes  Disposition: Hospice Consulted, Family would like to pursue comfort care    SUBJECTIVE:  Chief Complaint: None    Subjective: Patient nonverbal  Does not follow commands  OBJECTIVE:   Vitals:   Temp:  [97 8 °F (36 6 °C)-98 8 °F (37 1 °C)] 97 8 °F (36 6 °C)  HR:  [50-73] 59  Resp:  [16-30] 18  BP: (114-160)/(61-96) 144/90    Intake/Output:  I/O       09/07 0701  09/08 0700 09/08 0701  09/09 0700 09/09 0701  09/10 0700    P  O    0    I V    678 5    IV Piggyback  100     Total Intake  100 678 5    Net  +100 +678 5           Unmeasured Urine Occurrence  1 x 1 x         Nutrition: Diet Regular; Pleasure Feed  GI Proph/Bowel Reg: None  VTE Prophylaxis:Sequential compression device Primitivo Chel)      Physical Exam:   GENERAL APPEARANCE: NAD appears comfortable sitting up in a chaire  NEURO: GCS 9, Alert but does not follow commands and is nonverbal at baseline  HEENT: PERRL Right, Left somewhat oval, NR 3mm appears to be cataract  CV: RRR  LUNGS: CTA B/L throughout  GI: Abdomen soft, NT, ND, +BS x 4  : Intact voiding  MSK: GAUTHIER's   SKIN: Intact    Invasive Devices  Report    Peripheral Intravenous Line  Duration           Peripheral IV 09/08/22 Dorsal (posterior); Left Hand <1 day    Peripheral IV 09/08/22 Right Antecubital <1 day                      Lab Results:   Results: I have personally reviewed all pertinent laboratory/tests results, BMP/CMP:   Lab Results   Component Value Date    SODIUM 138 09/09/2022    K 4 3 09/09/2022     (H) 09/09/2022    CO2 25 09/09/2022    BUN 15 09/09/2022    CREATININE 1 10 09/09/2022    CALCIUM 9 9 09/09/2022    EGFR 42 09/09/2022   , CBC:   Lab Results   Component Value Date    WBC 4 92 09/09/2022    HGB 10 5 (L) 09/09/2022    HCT 32 7 (L) 09/09/2022     (H) 09/09/2022    PLT 67 (L) 09/09/2022    MCH 32 6 09/09/2022    MCHC 32 1 09/09/2022    RDW 13 7 09/09/2022    MPV 10 4 09/09/2022    NRBC 0 09/09/2022    and Coagulation:   Lab Results   Component Value Date    INR 1 72 (H) 09/08/2022     Imaging/EKG Studies: I have personally reviewed pertinent reports  None  Other Studies: CTH: SDH along right cerebral convexity increased in size from prior exam  Persistent mass effect on right cerebral hemisphere and worsened compression of right lateral ventricle and leftward midline shift  Unchanged acute small hemorrhagic brain contusions in right anterior-inferior frontal lobe with associated moderate vasogenic edema    Unchanged trace intraventricular hemorrhage in occipital horn of left lateral ventricle    Trace right parafalcine subdural hematoma, decreased from prior exam likely due to redistribution of blood products

## 2022-09-09 NOTE — PLAN OF CARE
Problem: OCCUPATIONAL THERAPY ADULT  Goal: Performs self-care activities at highest level of function for planned discharge setting  See evaluation for individualized goals  Description: Treatment Interventions: ADL retraining, Functional transfer training, Endurance training, UE strengthening/ROM, Cognitive reorientation, Patient/family training, Equipment evaluation/education, Compensatory technique education          See flowsheet documentation for full assessment, interventions and recommendations  Note: Limitation: Decreased ADL status, Decreased Safe judgement during ADL, Decreased cognition, Decreased endurance, Decreased self-care trans, Non-func R UE  Prognosis: Fair  Assessment: Pt is a 80 y o  female who was [facility] to [facility] on 9/8/2022 with Subdural hematoma, acute ([facility] [address] ) s/p fall   Pt's problem list also includes PMH of HTN, limited communication, previous surgery and limited cognition  At baseline pt was completing adls with assist from facility staff - ambulates with RW- meals/homemaking provided by facility  Pt lives at [facility]  Currently pt requires max assist for overall ADLS and max assist for functional mobility/transfers  Pt currently presents with impairments in the following categories -difficulty performing ADLS, flat affect, decreased initiation and engagement , health management  and environment activity tolerance, endurance, standing balance/tolerance, sitting balance/tolerance, UE ROM, arousal, memory, insight, safety , judgement , attention , sequencing , task initiation  and communication   These impairments, as well as pt's fatigue, spinal precautions, orthopedic restricitions , WBS , decreased caregiver support, risk for falls and home environment  limit pt's ability to safely engage in all baseline areas of occupation, includingeating, grooming, bathing, dressing, toileting, functional mobility/transfers, social participation  and leisure activities From OT standpoint, recommend inpt rehab unless facility is able to meet pt care needs upon D/C  OT will continue to follow to address the below stated goals       OT Discharge Recommendation: Post acute rehabilitation services (unless faciilty able to meet pt care needs)

## 2022-09-09 NOTE — SPEECH THERAPY NOTE
Bedside Swallow Evaluation:    Summary:  Pt presented w/ mod-severe oral dysphagia and suspected moderate pharyngeal dysphagia  The patient does not allow oral care  She clamps mouth shut  With cues she eventually takes thin liquids via straw  Draw from straw is weak  Transfer time is prolonged and suspect that swallow initiation time is also delayed  No overt s/s aspiration observed  The patient intermittently takes tsp of puree  She is trialed with applesauce, vanilla and chocolate pudding  She does not transfer or clear bolus  Oral suction is provided to remove retention  Recommendations:  Diet: NPO-may have sips of water  Liquid: Thin-may have sips   Meds: non oral for now   Supervision: Full   Positioning:Upright  Strategies: Encourage patient to swallow     Oral care: Frequent, as patient will allow   Aspiration precautions  Therapy Prognosis: Fair  Prognosis considerations: cognitive status, medical status   Frequency: 3-5x week, as able     Consider consult w/:  GI  Nutrition    Goal(s):  Pt will tolerate least restrictive diet w/out s/s aspiration or oral/pharyngeal difficulties  H&P/Admit info/ pertinent provider notes: (PMH noted above)  HPI:    Shannon Trejo is a 80 y o  female who presents as a transfer from [facility] after sustaining a witnessed fall  She is on Plavix  The patient was unable to provide further history given her dementia  She [facility] for escalation of care for her neurological injuries      Special Studies:  Head CT 9/9/2022:   1 6 cm thick acute subdural hematoma along right cerebral convexity, increased in size from prior exam   Persistent mass effect on right cerebral hemisphere with worsened compression of right lateral ventricle and 0 4 cm leftward midline shift    Worsened acute small subdural hematoma along bilateral tentorium cerebelli (right worse than left) with extension posterior fossa (left worse than right) and inferior retroclival, and anterior superior aspect of visualized craniocervical junction region     Unchanged acute small hemorrhagic brain contusions in right anterior-inferior frontal lobe with associated moderate vasogenic edema    Unchanged trace intraventricular hemorrhage in occipital horn of left lateral ventricle    Trace right parafalcine subdural hematoma, decreased from prior exam likely due to redistribution of blood products  Previous VBS:  None on record     Patient's goal: none  Patient non verbal     Did the pt report pain? no  If yes, was nursing notified/was it addressed? Reason for consult:  R/o aspiration  Determine safest and least restrictive diet    Precautions:  Aspiration  Fall    Food allergies:  None   Current diet:  NPO  Premorbid diet[de-identified]  Unsure; last MAR shows regular with thin liquids   O2 requirement:  None  Voice/Speech:  Non verbal   Social/Prior living environment:  [facility] [place]  Follows commands: No                        Cognitive Status:  Awake, but does not follow commands or participate   Oral [facility] exam:  Dentition: has upper dentures   Unable to assess as patient is not able to follow commands   Oral care attempted, but patient did not allow     Items administered:  Puree, thin liquids  Liquids were taken by straw    Oral stage:  Lip closure: weak draw from straw, only intermittently takes tsp   Mastication: n/a  Bolus formation: reduced  Bolus control: reduced  Transfer: prolonged with thin; absent with puree   Oral residue: yes   Oral suction used to remove puree   Pocketing: yes    Pharyngeal stage:  Swallow promptness: suspect delayed  Laryngeal rise: appears adequate, but not able to palpate due to c collar  Wet voice: n/a  Throat clear: n/a  Cough: no  Secondary swallows: no  Audible swallows: no    Esophageal stage:  No s/s reported    Aspiration precautions posted    Results d/w:  nursing

## 2022-09-09 NOTE — CASE MANAGEMENT
Case Management Assessment & Discharge Planning Note    Patient name Emperatriz Sarmiento  [address] [id number]  : 1926 Date 2022       Current Admission Date: 2022  Current Admission Diagnosis:Subdural hematoma, acute [facility])   Patient Active Problem List    Diagnosis Date Noted    Fall 2022    Periprosthetic fracture of shoulder 2022    Dens fracture ([facility] ) 2022    Subdural hematoma, acute ([place] [address] ) 2022    Severe episode of recurrent major depressive disorder, without psychotic features ([place] [address] ) 2021    Hypokalemia 2019    Fever 2019    Dementia ([place] [address] ) 2019    CKD (chronic kidney disease) stage 2, GFR 60-89 ml/min 2018    Closed 4-part fracture of proximal humerus, left, initial encounter 2018    Pre-op evaluation 2018    EKG, abnormal 2018    Essential hypertension 2018      LOS (days): 1  Geometric Mean LOS (GMLOS) (days): 4 60  Days to GMLOS:3 9     OBJECTIVE:    Risk of Unplanned Readmission Score: 15 11         Current admission status: Inpatient       Preferred Pharmacy:   [id number]  Phone: [phone] Fax: [phone]    Primary Care Provider: Juanita Wade DO    Primary Insurance: MEDICARE  Secondary Insurance: [employer]    ASSESSMENT:  [place] [address] Proxies    There are no active Health Care Proxies on file         Advance Directives  Does patient have a Health Care POA?: Yes  Does patient have Advance Directives?: Yes  Advance Directives: Living will, Power of  for health care  Primary Contact: Rene Dewey (Daughter In-Law) [phone]         Readmission Root Cause  30 Day Readmission: No    Patient Information  Admitted from[de-identified] Facility ([facility])  Mental Status: Alert, Confused  During Assessment patient was accompanied by: Not accompanied during assessment  Assessment information provided by[de-identified] Patient  Primary Caregiver: Self  Support Systems: Children, Self, Family members, Home care staff  [place] of Residence: [address] do you live in?: [facility] entry access options   Select all that apply : No steps to enter home, Elevator  Type of Current Residence: Apartment  Floor Level: 2  Upon entering residence, is there a bedroom on the main floor (no further steps)?: Yes  Upon entering residence, is there a bathroom on the main floor (no further steps)?: Yes  In the last 12 months, was there a time when you were not able to pay the mortgage or rent on time?: No  In the last 12 months, how many places have you lived?: 1  In the last 12 months, was there a time when you did not have a steady place to sleep or slept in a shelter (including now)?: No  Homeless/housing insecurity resource given?: N/A  Living Arrangements: Lives Alone  Is patient a ?: No    Activities of Daily Living Prior to Admission  Functional Status: Assistance  Completes ADLs independently?: No  Level of ADL dependence: Assistance  Ambulates independently?: Yes  Does patient use assisted devices?: Yes  Assisted Devices (DME) used: [facility]  Does patient currently own DME?: Yes  What DME does the patient currently own?: [facility] [place] [facility]  Does patient have a history of Outpatient Therapy (PT/OT)?: Yes  Does the patient have a history of Short-Term Rehab?: Yes  Does patient have a history of HHC?: Yes  Does patient currently have [facility]?: No    Patient Information Continued  Income Source: Pension/shelter  Does patient have prescription coverage?: Yes  Within the past 12 months, you worried that your food would run out before you got the money to buy more : Never true  Within the past 12 months, the food you bought just didn't last and you didn't have money to get more : Never true  Food insecurity resource given?: N/A  Does patient receive dialysis treatments?: No  Does patient have a history of substance abuse?: No  Does patient have a history of Mental Health Diagnosis?: No    Means of Transportation  Means of Transport to Appts[de-identified] Family transport  In the past 12 months, has lack of transportation kept you from medical appointments or from getting medications?: No  In the past 12 months, has lack of transportation kept you from meetings, work, or from getting things needed for daily living?: No  Was application for public transport provided?: N/A    DISCHARGE DETAILS:    Discharge planning discussed with[de-identified] patient's dtr in law  Freedom of Choice: Yes     CM contacted family/caregiver?: Yes  Were Treatment Team discharge recommendations reviewed with patient/caregiver?: Yes  Did patient/caregiver verbalize understanding of patient care needs?: Yes  Were patient/caregiver advised of the risks associated with not following Treatment Team discharge recommendations?: Yes    Contacts  Patient Contacts: Abby Llanes (Daughter In-Law) [phone]  Relationship to Patient[de-identified] Family  Contact Method: Phone  Phone Number: [phone]  Reason/Outcome: Continuity of Care, Emergency Contact, Discharge Planning    CM spoke to pt's dtr in law, to discuss the role of CM  Pt resides at [facility] [place] since 2019, in a 1st floor apartment which has 0STE  Pt;s bathroom is a walk-in shower with grab bars, shower chair, and raised toilet  Pt doesn't drive, is a former [place], and reports needing assistance for all ADL/IADLs  Pt ambulates with a walker  Pt's had 3-4 falls in the last 6 months  Pt reports no hobbies  Pt has a living will  Pt obtains medications on site  Pt has no documented hx of mental health or substance abuse  Pt's family state the pt has her COVID vaccinations but is unsure of the dates and brand  CM will appreciate therapy recommendations when appropriate       CM reviewed d/c planning process including the following: identifying help at home, patient preference for d/c planning needs, Discharge Lounge, [facility] Meds to Bed program, availability of treatment team to discuss questions or concerns patient and/or family may have regarding understanding medications and recognizing signs and symptoms once discharged  CM also encouraged patient to follow up with all recommended appointments after discharge  Patient advised of importance for patient and family to participate in managing patients medical well being

## 2022-09-09 NOTE — PLAN OF CARE
Problem: SAFETY,RESTRAINT: NV/NON-SELF DESTRUCTIVE BEHAVIOR  Goal: Remains free of harm/injury (restraint for non violent/non self-detsructive behavior)  Description: INTERVENTIONS:  - Instruct patient/family regarding restraint use   - Assess and monitor physiologic and psychological status   - Provide interventions and comfort measures to meet assessed patient needs   - Identify and implement measures to help patient regain control  - Assess readiness for release of restraint   Outcome: Progressing  Goal: Returns to optimal restraint-free functioning  Description: INTERVENTIONS:  - Assess the patient's behavior and symptoms that indicate continued need for restraint  - Identify and implement measures to help patient regain control  - Assess readiness for release of restraint   Outcome: Progressing     Problem: PAIN - ADULT  Goal: Verbalizes/displays adequate comfort level or baseline comfort level  Description: Interventions:  - Encourage patient to monitor pain and request assistance  - Assess pain using appropriate pain scale  - Administer analgesics based on type and severity of pain and evaluate response  - Implement non-pharmacological measures as appropriate and evaluate response  - Consider cultural and social influences on pain and pain management  - Notify physician/advanced practitioner if interventions unsuccessful or patient reports new pain  Outcome: Progressing     Problem: INFECTION - ADULT  Goal: Absence or prevention of progression during hospitalization  Description: INTERVENTIONS:  - Assess and monitor for signs and symptoms of infection  - Monitor lab/diagnostic results  - Monitor all insertion sites, i e  indwelling lines, tubes, and drains  - Monitor endotracheal if appropriate and nasal secretions for changes in amount and color  - [place] appropriate cooling/warming therapies per order  - Administer medications as ordered  - Instruct and encourage patient and family to use good hand hygiene technique  - Identify and instruct in appropriate isolation precautions for identified infection/condition  Outcome: Progressing  Goal: Absence of fever/infection during neutropenic period  Description: INTERVENTIONS:  - Monitor WBC    Outcome: Progressing     Problem: DISCHARGE PLANNING  Goal: Discharge to home or other facility with appropriate resources  Description: INTERVENTIONS:  - Identify barriers to discharge w/patient and caregiver  - Arrange for needed discharge resources and transportation as appropriate  - Identify discharge learning needs (meds, wound care, etc )  - Arrange for interpretive services to assist at discharge as needed  - Refer to Case Management Department for coordinating discharge planning if the patient needs post-hospital services based on physician/advanced practitioner order or complex needs related to functional status, cognitive ability, or social support system  Outcome: Progressing     Problem: Knowledge Deficit  Goal: Patient/family/caregiver demonstrates understanding of disease process, treatment plan, medications, and discharge instructions  Description: Complete learning assessment and assess knowledge base    Interventions:  - Provide teaching at level of understanding  - Provide teaching via preferred learning methods  Outcome: Progressing

## 2022-09-09 NOTE — QUICK NOTE
Critical care Update    Called daughter in law Marleni, who states she is the POA  She is  to the patient's son and they live in [place]  She confirms there is also 1 daughter Valerie Man who lives in [place]  Updated Marleni on the recent imaging with change from prior CT at prior hospital  Discussed the progression of the bleed in short interval time  She understands and already had discussion with her  about the possibility of comfort care given these findings as they do not want her to suffer  She just wants to talk to [facility], the sister, to make sure the family is all on the same page  Marleni confirms that the patient would not have wanted a tube placed or to be on a mechanical ventilator, stating that she would like the patient to be DNR/DNI at this time  Level 3 order updated in chart  They will have a family discussion and call back  TEG still pending in the meantime

## 2022-09-09 NOTE — ASSESSMENT & PLAN NOTE
At baseline  Unable to provide history  - Patients family states she has been declining over the last few years

## 2022-09-09 NOTE — CONSULTS
Consultation - Geriatric Medicine   Liss Solares 80 y o  female MRN: [id number]  Unit/Bed#: [facility] 621-01 Encounter: [id number]      Assessment/Plan     Ambulatory dysfunction with fall  -reportedly mechanical fall at care facility on 9/8/22  -(+) head strike (-) loss of consciousness  -maintained on plavix as o/p  -injuries as outlined below  -Requires use of walker for ambulation at baseline  -hx recurrent falls with history of extensive prior traumatic injuries  -remains high risk future falls due to age, impaired vision, hx of recurrent falls, deconditioning/debility, poor safety awareness, advanced dementia and unfamiliar environment   -encourage good body mechanics and assist with all transfers  -keep personal items and call bell close to prevent reaching  -maintain environment free of fall hazards  -encourage appropriate footwear and adequate lighting at all times when out of bed  -PT and OT pending    Right frontal cerebral convexity subdural hematoma  -s/p fall as outlined above  -home Plavix reversed with DDAVP on admission  -CTH on initial presentation reports 3 cm acute mixed density hemorrhagic brain contusion in right anterior inferior frontal lobe with moderate surrounding vasogenic edema, 1 cm thick acute subdural hematoma along right frontal cerebral convexity with extension into right parafalcine and left tentorium cerebella, trace intraventricular hemorrhage in occipital horn left lateral ventricle, unchanged 0 9 cm left frontal meningioma and unchanged severe chronic microangiopathic changes  -CTA on admission reports finding suggestive of active extravasation of right subdural hematoma as well as worsening right hemisphere subdural hematoma now with 4 mm right-to-left midline shift  -Nsx on board, non-operative management due to age and co-morbidities   -neurochecks per protocol   -AC/AP on hold  -currently on Keppra for seizure prophylaxis  -repeat CTH pending     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  -see above     Acute C2 fracture  -CT C-spine reports acute C2 fracture with involvement of base of dens  -C-spine precautions, collar at all times   -neurochecks per protocol  -continue acute pain control   -Nsx rec non-op management     Left periprosthetic humerus fracture  -non-displaced periprosthetic fracture along proximal humerus reported on left humerus XR on admission  -NWB LUE in sling   -Ortho - non-op management with close o/p f/u    Acute pain due to trauma   -recommend pain control per Geriatric pain protocol:  Tylenol 975mg Q8H scheduled  Roxicodone 2 5mg Q4H PRN moderate pain  Roxicodone 5mg Q4H PRN severe pain  Dilaudid 0 2mg Q4H PRN  -consider adjuncts such as lidocaine patch topically  -encourage addition of non-pharmacologic pain treatment including ice and frequent repositioning  -recommend  bowel regimen to prevent and treat constipation due to increased risk with acute pain and opiate pain medications    Dementia without behavorial disturbance  -severe and progressive   -resides in long term care facility   -reportedly non-verbal at baseline dependent for all cares   -[address] on admission reveals acute changes as above as well as severe chronic microangiopathic changes  -TSH 2 25  -underlying cognitive impairment markedly increased risk of developing dementia during hospitalization, continue strict precautions  -given severity of patient's dementia as as extent of acute traumatic injuries strongly encourage consideration of hospice consult     Hx ischemic CVA  -maintained on Lipitor Plavix as outpatient  -Plavix reversed on admission as above  -continue secondary risk factor modifications as indicated    Severe recurrent major depressive disorder  -awaiting fax of medication list from long-term facility  -continue psychosocial supports  -unlikely to receive significant benefit from counseling given advanced dementia    Impaired Hearing  -Encourage use of hearing aids at all appropriate times  -encourage providers and caregivers to speak slowly and clearly directly to patient  -minimize background noise to encourage patient engagement  -consider use of hearing amplifier to reduce risk of straining to hear if hearing aids are not present or are not sufficient     Impaired Vision  -recommend use of corrective lenses at all appropriate times  -encourage adequate lighting and encourage use of assistance with ambulation  -keep personal belongings close to person to avoid reaching  -encourage appropriate footwear at all times    Impaired mastication  -Requires use of dentures - encourage use at all appropriate times  -ensure meal consistency appropriate for abilities  -continue aspiration precautions    Deconditioning/debilty/frailty  -clinical frailty scale stage 8/9  -multifactorial including age, advanced dementia and extensive acute intracerebral hemorrhages  -continue supportive cares, continue to address goals of care with family and ensure treatments and interventions are aligned with patient's and family's wishes and goals of care    Delirium precautions  -Patient is high risk of delirium due to age, dementia, fall and acute traumatic injury, acute pain, hospitalization   -continue delirium precautions  -maintain normal sleep/wake cycle  -minimize overnight interruptions, group overnight vitals/labs/nursing checks as possible  -dim lights, close blinds and turn off tv to minimize stimulation and encourage sleep environment in evenings  -ensure that pain is well controlled   -monitor for fecal and urinary retention which may precipitate delirium  -provide frequent reorientation and redirection as indicated appropriate  -encourage family and friends at the bedside to help help calm patient if anxious  -minimize use of medications which may precipitate or worsen delirium such as tramadol, benzodiazepine, anticholinergics, and benadryl as possible  -encourage hydration and nutrition   -redirect unwanted behaviors as first line    Home medication review    Spoke with patient's long-term care facility, awaiting fax of current outpatient medications    Care coordination: Rounded with Flaca Ribera (RN)    History of Present Illness   Physician Requesting Consult: Tia Arce MD  Reason for Consult / Principal Problem: Fall  Hx and PE limited by: advanced dementia   Additional history obtained from: Chart review and patient evaluation, personally spoke with patients care facility to obtain additional information     HPI: Ayad Vasquez is a 80y o  year old female with dementia without behavior disturbance ambulatory dysfunction, history of ischemic stroke, hypothyroidism, CKD-II hypertension, severe episode recurrent major depression, and recurrent UTIs who is admitted to the trauma service with ambulatory dysfunction fall found to have numerous traumatic injuries, she initially presented to the on hospital and was transferred for evaluation by the trauma service, she is being seen in consultation by Geriatrics for dementia with high risk of developing delirium during hospitalization  She is seen and examined at bedside, at time of evaluation she is nonverbal and unable to provide reliable HPI and thus it was obtained through extensive chart review, discussion with medical teams and information obtained personally from patient's long-term facility  She initially presented to the [facility] yesterday following witnessed fall in the dining chandra at which she struck her head but did not lose consciousness  Bella Drake has advanced dementia and is dependent for all cares  She ambulates with use of walker and has history of recurrent falls, she requires use hearing aids glasses and dentures  She has no previously documented history of agitation or behavior disturbance      Inpatient consult to Gerontology  Consult performed by: Aicha Horta DO  Consult ordered by: Divya Harris MD        Review of Systems   Unable to perform ROS: Mental status change     Historical Information   Past Medical History:   Diagnosis Date    Disease of thyroid gland     Hypertension      Past Surgical History:   Procedure Laterality Date    EYE SURGERY      [place] RECONSTR TOTAL SHOULDER IMPLANT Left 12/10/2018    Procedure: LEFT REVERSE TOTAL SHOULDER ARTHROPLASTY;  Surgeon: Andreina Cunningham MD;  [facility]: [facility];  Service: Orthopedics     Social History   Social History     Substance and Sexual Activity   Alcohol Use No     Social History     Substance and Sexual Activity   Drug Use No     Social History     Tobacco Use   Smoking Status Never Smoker   Smokeless Tobacco Never Used     Family History:   Family History   Problem Relation Age of Onset    No Known Problems Mother     No Known Problems Father      Meds/Allergies   all current active meds have been reviewed    No Known Allergies    Objective     Intake/Output Summary (Last 24 hours) at 9/9/2022 0620  Last data filed at 9/8/2022 2225  Gross per 24 hour   Intake 100 ml   Output --   Net 100 ml     Invasive Devices  Report    Peripheral Intravenous Line  Duration           Peripheral IV 09/08/22 Dorsal (posterior); Left Hand <1 day    Peripheral IV 09/08/22 Right Antecubital <1 day              Physical Exam  Vitals and nursing note reviewed  Constitutional:       General: She is not in acute distress  Comments: Frail elderly female    HENT:      Head: Normocephalic  Nose: Nose normal       Mouth/Throat:      Mouth: Mucous membranes are dry  Comments: Right mouth droop  Eyes:      General:         Right eye: Discharge present  Left eye: Discharge present  Cardiovascular:      Rate and Rhythm: Normal rate and regular rhythm  Pulmonary:      Effort: No respiratory distress  Breath sounds: Wheezing present  Abdominal:      General: There is no distension        Palpations: Abdomen is soft  Tenderness: There is no abdominal tenderness  Musculoskeletal:      Cervical back: Neck supple  Right lower leg: No edema  Left lower leg: No edema  Comments: Diffuse severe subcutaneous fat and muscle wasting    Bilateral soft hand mitt restraints in place   Skin:     General: Skin is warm and dry  Neurological:      Comments: Somnolent, does not awaken to voice, withdraws from painful stimuli   Psychiatric:      Comments: No restlessness appreciated       Lab Results:     I have personally reviewed pertinent lab results      I have personally reviewed the following imaging study reports in PACS:    9/8/22 - left humerus XR, CT head without contrast, CT C-spine without contrast, CTA head and neck with without contrast    Therapies:   PT:  Pending  OT:  Pending    VTE Prophylaxis: RX contraindicated due to: Acute intracerebral hemorrhage    Code Status: Level 3 - DNAR and DNI  Advance Directive and Living Will:      Power of :    POLST:      Family and Social Support:  Care facility staff    Goals of Care: DNR/DNI

## 2022-09-09 NOTE — QUICK NOTE
Trauma Surgery    CTA head/neck without evidence of blunt cerebrovascular injury but R SDH is larger with evidence of brain compression / midline shift and active bleeding into this space  She is s/p DDAVP for plavix-induced platelet dysfunction  Discussed with NSGY; no plans for surgical intervention at this time  I attempted to contact family - daughter in law and two sons, but no answer  However, based off ED documentation - "Elías Portillo who lives in [place] who states that she is a DNR and that if further surgical intervention were needed that they would likely declined given her age and current mental status "    Will continue current cares with q1h neuro checks  Keppra for seizure ppx  TEG pending, reverse AC/AP rx if persistent, based off TEG results  Remains DNAR II until further goals of discussion can be held with family      Marlo Caldwell MD

## 2022-09-09 NOTE — PROGRESS NOTES
[facility]  Progress Note Lutricia Rides 8/29/1926, 80 y o  female MRN: [id number]  Unit/Bed#: [place] [address]-01 Encounter: [id number]  Primary Care Provider: Alma Daniel DO   Date and time admitted to hospital: 9/8/2022  6:49 PM    Dens fracture [facility])  Assessment & Plan  9/8 CT cervical spine: New acute C2 fracture involving base of dens  Plan:  - consult neurosurgery  - cervical collar in place    Periprosthetic fracture of shoulder  Assessment & Plan  9/8 Xray left humerus: Findings indeterminate for a small nondisplaced periprosthetic fracture along the proximal aspect of the humerus  Plan:  - ortho consult  - maintain sling    Dementia ([facility] [address] )  Assessment & Plan  At baseline  Unable to provide history      CKD (chronic kidney disease) stage 2, GFR 60-89 ml/min  Assessment & Plan  Lab Results   Component Value Date    EGFR 43 09/08/2022    EGFR 38 09/08/2022    EGFR 39 04/21/2021    CREATININE 1 07 09/08/2022    CREATININE 1 20 09/08/2022    CREATININE 1 19 04/21/2021     Monitor BUN/Cr    Essential hypertension  Assessment & Plan  Propanolol 160mg PO  Goal SBP < 140  * Subdural hematoma, acute [facility])  Assessment & Plan  9/8 CT Head: 3 0 cm acute mixed-density hemorrhagic brain contusion of right anterior-inferior frontal lobe with moderate surrounding vasogenic edema, 1 0 cm thick acute subdural hematoma along right frontal cerebral convexity with some extension of blood products into the right parafalcine and right tentorium cerebelli, Trace intraventricular hemorrhage in the occipital horn of left lateral ventricle        Plan:  - neurosurgery consult        Progress Note -   ICU Transfer to Step down/med  surg  Lutricia Rides 80 y o  female MRN: [id number]    Unit/Bed#: [facility] 621-01 Encounter: [id number]      Code Status: Level 3 - DNAR and DNI    Date of ICU admission: 9/8/2022    Reason for ICU adm: Dens fracture ([facility] [address] ) [F01 454Q]  Subdural hematoma, acute ([address] ) [C33 [id number]]  Fall, initial encounter [W19  XXXA]  Unspecified multiple injuries, initial encounter [T07  XXXA]  Periprosthetic fracture of shoulder [id number]  [id number]]  Late onset Alzheimer's dementia without behavioral disturbance ([facility] [address] ) [G30 1, F02 80]    Active problems:   Patient Active Problem List   Diagnosis    Closed 4-part fracture of proximal humerus, left, initial encounter    Pre-op evaluation    EKG, abnormal    Essential hypertension    CKD (chronic kidney disease) stage 2, GFR 60-89 ml/min    Fever    Dementia (HCC)    Hypokalemia    Severe episode of recurrent major depressive disorder, without psychotic features ([address] )    Fall    Periprosthetic fracture of shoulder    Dens fracture ([address] )    Subdural hematoma, acute ([address] )       Summary of clinical course: Pt was a trauma transfer from [place] [address] For a C2 fracture, subdural hematoma  Repeat CT scan head upon presentation to [facility] showed worsening of subdural hematoma  Conversation with patient's family and it was decided to make patient code level 3  Neurosurgery stated their plan is non operative given patient's age and medical conditions  Patient also has a left proximal humerus fracture and orthopedics was consulted  They are planning a non operative treatment  Patient has been loaded with Keppra to prevent seizures  Recent or scheduled procedures: N/A    Recent diagnostics: C2 fracture, SDH,  Left proximal humerus fracture    Neuro: GCS   Pain   CV:RRR, normotensive  Pulm:   Encourage Incentive Spirometry  Titrate O2 to 92-94%  GI:  Diet Last BM  :  Monitor Intake and Output  Skin: Encourage turning every 2 hours    F/E/N:   Activity: OOB/PT

## 2022-09-09 NOTE — OCCUPATIONAL THERAPY NOTE
Occupational Therapy Evaluation     Patient Name: Kush Mcginnis  [id number] Date: 9/9/2022  Problem List  Principal Problem:    Subdural hematoma, acute ([facility] [address] )  Active Problems:    Essential hypertension    CKD (chronic kidney disease) stage 2, GFR 60-89 ml/min    Dementia ([facility] [address] )    Fall    Periprosthetic fracture of shoulder    Dens fracture ([facility] [address] )    Past Medical History  Past Medical History:   Diagnosis Date    Disease of thyroid gland     Hypertension      Past Surgical History  Past Surgical History:   Procedure Laterality Date    EYE SURGERY      [place] RECONSTR TOTAL SHOULDER IMPLANT Left 12/10/2018    Procedure: LEFT REVERSE TOTAL SHOULDER ARTHROPLASTY;  Surgeon: Nicki Nash MD;  Location: [facility];  Service: Orthopedics         09/09/22 1030   OT Last Visit   OT Visit Date 09/09/22   Note Type   Note type Evaluation   Restrictions/Precautions   Weight Bearing Precautions Per Order Yes   RUE Weight Bearing Per Order WBAT   LUE Weight Bearing Per Order NWB   RLE Weight Bearing Per Order WBAT   LLE Weight Bearing Per Order WBAT   Braces or Orthoses C/S Collar   Other Precautions Spinal precautions; Fall Risk;Cognitive; Chair Alarm; Bed Alarm   Pain Assessment   Pain Assessment Tool FLACC   Pain Rating: FLACC (Rest) - Face 0   Pain Rating: FLACC (Rest) - Legs 0   Pain Rating: FLACC (Rest) - Activity 0   Pain Rating: FLACC (Rest) - Cry 0   Pain Rating: FLACC (Rest) - Consolability 0   Score: FLACC (Rest) 0   Pain Rating: FLACC (Activity) - Face 0   Pain Rating: FLACC (Activity) - Legs 0   Pain Rating: FLACC (Activity) - Activity 0   Pain Rating: FLACC (Activity) - Cry 0   Pain Rating: FLACC (Activity) - Consolability 0   Score: FLACC (Activity) 0   Home Living   Type of Home Assisted living  ([facility])   Home Layout One level   Prior Function   Level of [place] Needs assistance with IADLs; Needs assistance with ADLs and functional mobility   Lives With Facility staff   Receives Help From Personal care attendant   ADL Assistance Needs assistance   IADLs Needs assistance   Falls in the last 6 months 1 to 4   Vocational Retired   Lifestyle   Autonomy pt receives assist from facility staff for adls, reportedly ambulates with RW - meals/homemaking provided - pt nonverbal t/o session and unable to provide any information   Reciprocal Relationships supportive family and facility staff   Service to Others retired   Intrinsic Gratification sedentary - did not id any interests   Psychosocial   Patient Behaviors/Mood Not interactive   Ability to Express Feelings Unable to express   Ability to Express Needs Unable to express   Ability to Express Thoughts Unable to express   Subjective   Subjective non verbal t/o   ADL   Eating Assistance Unable to assess   Grooming Assistance 2  Maximal Assistance   UB Bathing Assistance 2  Maximal Assistance   LB Bathing Assistance 2  Maximal Assistance   UB Dressing Assistance 2  Maximal Assistance   LB Dressing Assistance 2  Maximal [address]  2  Maximal Assistance   Bed Mobility   Supine to Sit 2  Maximal assistance   Transfers   Sit to Stand 2  Maximal assistance   Stand to Sit 2  Maximal assistance   Stand pivot 2  Maximal assistance   Balance   Static Sitting Fair -   Dynamic Sitting Poor   Static Standing Poor   Dynamic Standing Poor   Ambulatory Zero   Activity Tolerance   Activity Tolerance Patient limited by fatigue;Treatment limited secondary to medical complications (Comment)   Medical Staff Made Aware Pt seen as coeval with PT 2* medical complexity, comorbidities and limited overall tolerance to activity   RUE Assessment   RUE Assessment WFL   LUE Assessment   LUE Assessment X  (in sling)   Cognition   Overall Cognitive Status Impaired   Arousal/Participation Arousable;Persistent stimuli required;Poorly responsive   Attention Difficulty attending to directions   Orientation Level Unable to assess   Memory Unable to assess   Following Commands Unable to follow one step commands   Assessment   Limitation Decreased ADL status; Decreased Safe judgement during ADL;Decreased cognition;Decreased endurance;Decreased self-care trans; Non-func R UE   Prognosis Fair   Assessment Pt is a 80 y o  female who was [facility] to [facility] on 9/8/2022 with Subdural hematoma, acute ([address] ) s/p fall   Pt's problem list also includes PMH of HTN, limited communication, previous surgery and limited cognition  At baseline pt was completing adls with assist from facility staff - ambulates with RW- meals/homemaking provided by facility  Pt lives at [facility]  Currently pt requires max assist for overall ADLS and max assist for functional mobility/transfers  Pt currently presents with impairments in the following categories -difficulty performing ADLS, flat affect, decreased initiation and engagement , health management  and environment activity tolerance, endurance, standing balance/tolerance, sitting balance/tolerance, UE ROM, arousal, memory, insight, safety , judgement , attention , sequencing , task initiation  and communication  These impairments, as well as pt's fatigue, spinal precautions, orthopedic restricitions , WBS , decreased caregiver support, risk for falls and home environment  limit pt's ability to safely engage in all baseline areas of occupation, includingeating, grooming, bathing, dressing, toileting, functional mobility/transfers, social participation  and leisure activities  From OT standpoint, recommend inpt rehab unless facility is able to meet pt care needs upon D/C  OT will continue to follow to address the below stated goals  Goals   Patient Goals none stated   LTG Time Frame 10-14   Long Term Goal #1 refer to established goals below   Plan   Treatment Interventions ADL retraining;Functional transfer training; Endurance training;UE strengthening/ROM; Cognitive reorientation;Patient/family training;Equipment evaluation/education; Compensatory technique education   Goal Expiration Date 09/23/22   OT Frequency 2-3x/wk   Recommendation   OT Discharge Recommendation Post acute rehabilitation services  (unless faciilty able to meet pt care needs)   AM-PAC Daily Activity Inpatient   Lower Body Dressing 2   Bathing 2   Toileting 2   Upper Body Dressing 2   Grooming 2   Eating 2   Daily Activity Raw Score 12   Daily Activity Standardized Score (Calc for Raw Score >=11) 30 6   AM-PAC Applied Cognition Inpatient   Following a Speech/Presentation 1   Understanding Ordinary Conversation 2   Taking Medications 1   Remembering Where Things Are Placed or Put Away 1   Remembering List of 4-5 Errands 1   Taking Care of Complicated Tasks 1   Applied Cognition Raw Score 7   Applied Cognition Standardized Score 15 17     The patient's raw score on the AM-PAC Daily Activity inpatient short form is 12, standardized score is 30 6, less than 39 4  Patients at this level are likely to benefit from discharge to post-acute rehabilitation services  Please refer to the recommendation of the Occupational Therapist for safe discharge planning        OCCUPATIONAL THERAPY GOALS:      *Pt to follow 1 step commands with 75% consistency   *Min a feeding and grooming after setup with min cues to initiate  *Mod a adls after setup with use of AE PRN  *Mod a toileting and clothing management   *Mod a functional mobility and transfers to/from all surfaces with fair dynamic balance and safety for participation in dynamic adls and iadl tasks   *Demonstrate fair carryover with NWB RUE and safe use of AD during functional tasks    *Assess DME needs   *Increase activity tolerance to 25-30 minutes for participation in adls and enjoyable activities  *Assist with safe d/c recommendations     Harris Thakur, OT

## 2022-09-09 NOTE — WOUND OSTOMY CARE
Consult Note - Wound   Elner Covert 80 y o  female MRN: [id number]  Unit/Bed#: [facility] 621-01 Encounter: [id number]        History and Present Illness:  Patient is a 81 yo female that was admitted to the hospital for treatment of subdural hematoma post fall  Patient has a PMH of dementia and HTN  Dependent of all care needs  B/l mitt restraints in place,     Patient is a mod assist for turning and repositioning  Patient is incontinent of bowel and bladder  On assessment, patient is sitting OOB in chair  B/L heels intact, dry and ronal  B/l buttocks intact, pink, and blanches  Wound Care was consulted for Right knee    Assessment Findings:     Right knee redness/ bruise  Likely related to trauma post fall  Ecchymotic  Area is intact with no openings  Keep offloaded  No induration, fluctuance, odor, warmth/temperature differences, or purulence noted to the above noted wounds and skin areas assessed  New dressings applied per orders listed below  Patient tolerated well- no s/s of non-verbal pain or discomfort observed during the encounter  Bedside nurse aware of plan of care  See flow sheets for more detailed assessment findings  Orders listed below and wound care will sign off, call or tiger text with questions  Skin Care Plan:  1-Cleanse sacro-buttocks with soap and water  Apply Hydraguard BID and PRN  2-Turn/reposition q2h or when medically stable for pressure re-distribution on skin   3-Elevate heels to offload pressure  4-Moisturize skin daily with skin nourishing cream  5-Ehob cushion in chair when out of bed  6-Hydraguard to bilateral heels BID and PRN  Patient assessed and seen with Joel Osorio RN, BSN, [address] Jaguar Rae RN, BSN

## 2022-09-09 NOTE — CONSULTS
Orthopedics   [facility] 80 y o  female MRN: [id number]  Unit/Bed#: Cat Scan      Chief Complaint:   left arm and shoulder pain    HPI:   80 y o  right hand dominant female status post fall complaining of left shoulder pain  Patient has past medical history of hypertension and dementia  Patient previously had a left proximal humerus fracture after a fall in 2018 when she landed on her left shoulder  Patient underwent a left reverse total shoulder arthroplasty in December of 2018 by Dr Joseluis Kinsey  Surgery went uncomplicated without any problems  Earlier today, patient sustained a witnessed fall  Patient has dementia, and is unable to provide a history, but per notes she apparently fell [place] and may have struck the back of her head on a table  Patient was sent over as a transfer from [facility], and was found to have a subdural hematoma, hemorrhagic brain contusion, C2 fracture involving the base of the dens  Orthopedics was consulted due to left shoulder pain  Patient takes Plavix daily  Review Of Systems:   · Skin: per HPI and physical exam  · Neuro: See HPI  · Musculoskeletal: See HPI  · 14 point review of systems negative except as stated above     Past Medical History:   Past Medical History:   Diagnosis Date    Disease of thyroid gland     Hypertension        Past Surgical History:   Past Surgical History:   Procedure Laterality Date    EYE SURGERY      TX RECONSTR TOTAL SHOULDER IMPLANT Left 12/10/2018    Procedure: LEFT REVERSE TOTAL SHOULDER ARTHROPLASTY;  Surgeon: Bentley Guillermo MD;  Location: [facility];  Service: Orthopedics       Family History:  Family history reviewed and non-contributory  Family History   Problem Relation Age of Onset    No Known Problems Mother     No Known Problems Father        Social History:  Social History     Socioeconomic History    Marital status:       Spouse name: Not on file    Number of children: Not on file    Years of education: Not on file    Highest education level: Not on file   Occupational History    Not on file   Tobacco Use    Smoking status: Never Smoker    Smokeless tobacco: Never Used   Vaping Use    Vaping Use: Never used   Substance and Sexual Activity    Alcohol use: No    Drug use: No    Sexual activity: Not on file   Other Topics Concern    Not on file   Social History Narrative    Not on file     Social Determinants of Health     Financial Resource Strain: Not on file   Food Insecurity: Not on file   Transportation Needs: Not on file   Physical Activity: Not on file   Stress: Not on file   Social Connections: Not on file   Intimate Partner Violence: Not on file   Housing Stability: Not on file       Allergies:   No Known Allergies        Labs:  0   Lab Value Date/Time    HCT 37 2 09/08/2022 1631    HCT 41 9 04/21/2021 1918    HCT 34 7 (L) 09/24/2019 0441    HGB 12 1 09/08/2022 1631    HGB 13 8 04/21/2021 1918    HGB 11 3 (L) 09/24/2019 0441    INR 1 55 (H) 09/08/2022 1631    WBC 4 56 09/08/2022 1631    WBC 6 35 04/21/2021 1918    WBC 3 93 (L) 09/24/2019 0441    CRP 14 4 (H) 11/30/2018 1050       Meds:    Current Facility-Administered Medications:     acetaminophen (TYLENOL) tablet 650 mg, 650 mg, Oral, Q6H PRN, Robert Salazar DO    [START ON 9/9/2022] atorvastatin (LIPITOR) tablet 80 mg, 80 mg, Oral, Daily With Dinner, Robert Salazar DO    levETIRAcetam (KEPPRA) 500 mg in sodium chloride 0 9 % 100 mL IVPB, 500 mg, Intravenous, Q12H [facility], Chelsie Alvarenga MD    [START ON 9/9/2022] levothyroxine tablet 25 mcg, 25 mcg, Oral, Early Morning, Robert Salazar DO    melatonin tablet 6 mg, 6 mg, Oral, HS, Robert Brevard, DO    multi-electrolyte (PLASMALYTE-A/ISOLYTE-S PH 7 4) IV solution, 75 mL/hr, Intravenous, Continuous, Robert Salazar DO, Last Rate: 75 mL/hr at 09/08/22 2048, 75 mL/hr at 09/08/22 2048    oxyCODONE-acetaminophen (PERCOCET) 5-325 mg per tablet 1 tablet, 1 tablet, Oral, Q6H PRN, Marlo Chou DO    [START ON 9/9/2022] propranolol (INDERAL LA) 24 hr capsule 160 mg, 160 mg, Oral, Daily, Marlo Chou DO    Current Outpatient Medications:     acetaminophen (TYLENOL) 325 mg tablet, Take 650 mg by mouth every 6 (six) hours as needed for mild pain, Disp: , Rfl:     atorvastatin (LIPITOR) 80 mg tablet, Take 80 mg by mouth daily, Disp: , Rfl:     Bisacodyl (DULCOLAX RE), Insert into the rectum, Disp: , Rfl:     calcium carbonate-vitamin D (OSCAL-D) 500 mg-200 units per tablet, Take 1 tablet by mouth daily with breakfast, Disp: , Rfl:     Cholecalciferol (VITAMIN D3) 1000 units CAPS, Daily, Disp: , Rfl:     clopidogrel (PLAVIX) 75 mg tablet, Take 75 mg by mouth daily, Disp: , Rfl:     fluticasone (FLONASE) 50 mcg/act nasal spray, 1 spray into each nostril daily, Disp: , Rfl:     levothyroxine 25 mcg tablet, Take 25 mcg by mouth daily , Disp: , Rfl:     Loperamide HCl (LOPERAMIDE A-D PO), Take 1 tablet by mouth every 6 (six) hours as needed, Disp: , Rfl:     magnesium hydroxide (MILK OF MAGNESIA) 400 mg/5 mL oral suspension, Take by mouth daily as needed for constipation, Disp: , Rfl:     Melatonin 5 MG TABS, Take 1 tablet by mouth daily at bedtime, Disp: , Rfl:     Menthol-Methyl Salicylate (ALEXANDRO ARGUELLO GREASELESS) 10-15 % greaseless cream, Applied to affected area QID as needed, Disp: 30 g, Rfl: 0    Potassium 99 MG TABS, Take 99 mg by mouth , Disp: , Rfl:     propranolol (INDERAL LA) 160 mg, 160 mg daily  , Disp: , Rfl:     sertraline (ZOLOFT) 25 mg tablet, Take 50 mg by mouth daily , Disp: , Rfl:     traZODone (DESYREL) 50 mg tablet, Take 50 mg by mouth daily at bedtime as needed for sleep, Disp: , Rfl:     vitamin B-12 (VITAMIN B-12) 1,000 mcg tablet, Take by mouth daily, Disp: , Rfl:     Blood Culture:   Lab Results   Component Value Date    BLOODCX No Growth After 5 Days   09/22/2019       Wound Culture:   No results found for: WOUNDCULT    Ins and Outs:  No intake/output data recorded  Physical Exam:   /78 (BP Location: Right arm)   Pulse 72   Temp 97 8 °F (36 6 °C) (Oral)   Resp 16   SpO2 95%   Gen: No acute distress  HEENT: Eyes clear, moist mucus membranes, hearing intact  Respiratory: No audible wheezing or stridor  Cardiovascular: Well Perfused peripherally  Abdomen: nondistended, no peritoneal signs  Musculoskeletal: left upper extremity  · Skin intact, ecchymosis and swelling noted over the shoulder and proximal humerus  · Minimal tenderness to palpation over shoulder and upper arm  · Sensation intact to radial, ulna, median, musculocutaneous, and axillary nerve distributions  · Patient unable to comply with motor exam due to dementia but patient demonstrating ability to abduct shoulder, flex and extend all fingers and wrist during course of examination  · 2+ radial pulse  · Musculature is soft and compressible, no pain with passive stretch    Radiology:   I personally reviewed the films  X-rays left humerus shows nondisplaced periprosthetic fracture of the humeral implant of her reverse total shoulder arthroplasty  Assessment:  80 y o  female S/P fall with nondisplaced periprosthetic fracture of the humeral implant over worse total shoulder arthroplasty  Patient can be managed non operatively for this injury and immobilization with a shoulder sling        Plan:   · NWB LUE in sling  · Sling placed  · Analgesics for pain  · NonOp management of left periprosthetic rTSA proximal humeral fracture  · Dispo: Ortho will follow    Kathy Burt MD

## 2022-09-09 NOTE — ASSESSMENT & PLAN NOTE
Lab Results   Component Value Date    EGFR 42 09/09/2022    EGFR 43 09/08/2022    EGFR 38 09/08/2022    CREATININE 1 10 09/09/2022    CREATININE 1 07 09/08/2022    CREATININE 1 20 09/08/2022     Monitor BUN/Cr

## 2022-09-09 NOTE — PLAN OF CARE
Problem: Nutrition/Hydration-ADULT  Goal: Nutrient/Hydration intake appropriate for improving, restoring or maintaining nutritional needs  Description: Monitor and assess patient's nutrition/hydration status for malnutrition  Collaborate with interdisciplinary team and initiate plan and interventions as ordered  Monitor patient's weight and dietary intake as ordered or per policy  Utilize nutrition screening tool and intervene as necessary  Determine patient's food preferences and provide high-protein, high-caloric foods as appropriate       INTERVENTIONS:  - Monitor oral intake, urinary output, labs, and treatment plans  - Assess nutrition and hydration status and recommend course of action  - Evaluate amount of meals eaten  - Assist patient with eating if necessary   - Allow adequate time for meals  - Recommend/ encourage appropriate diets, oral nutritional supplements, and vitamin/mineral supplements  - Order, calculate, and assess calorie counts as needed  - Recommend, monitor, and adjust tube feedings and TPN/PPN based on assessed needs  - Assess need for intravenous fluids  - Provide specific nutrition/hydration education as appropriate  - Include patient/family/caregiver in decisions related to nutrition  9/9/2022 1131 by María Finch  Outcome: Not Progressing   NPO, per SLP

## 2022-09-10 NOTE — CASE MANAGEMENT
Case Management Discharge Planning Note    Patient name Ayad Vasquez  Location [facility] 621/PPHP 210-54 MRN [id number]  : 1926 Date 9/10/2022       Current Admission Date: 2022  Current Admission Diagnosis:Subdural hematoma, acute [facility])   Patient Active Problem List    Diagnosis Date Noted    Fall 2022    Periprosthetic fracture of shoulder 2022    Dens fracture ([facility] ) 2022    Subdural hematoma, acute ([place] [address] ) 2022    Severe episode of recurrent major depressive disorder, without psychotic features ([place] [address] ) 2021    Hypokalemia 2019    Fever 2019    Dementia ([place] [address] ) 2019    CKD (chronic kidney disease) stage 2, GFR 60-89 ml/min 2018    Closed 4-part fracture of proximal humerus, left, initial encounter 2018    Pre-op evaluation 2018    EKG, abnormal 2018    Essential hypertension 2018      LOS (days): 2  Geometric Mean LOS (GMLOS) (days): 4 60  Days to GMLOS:2 9     OBJECTIVE:  Risk of Unplanned Readmission Score: 15 1         Current admission status: Inpatient   Preferred Pharmacy:   [id number] [address]  Phone: [phone] Fax: [phone]    Primary Care Provider: Lincoln Dodd DO    Primary Insurance: MEDICARE  Secondary Insurance: AET[employer]    DISCHARGE DETAILS:    Discharge planning discussed with[de-identified] DIL Marleni  Freedom of Choice: Yes  Comments - Freedom of Choice: cm discussed hospice referral  CM contacted family/caregiver?: Yes  Were Treatment Team discharge recommendations reviewed with patient/caregiver?: Yes  Did patient/caregiver verbalize understanding of patient care needs?: Yes  Were patient/caregiver advised of the risks associated with not following Treatment Team discharge recommendations?: Yes    Contacts  Patient Contacts: Danyell Echevarria (Daughter In-Law) [phone]  Relationship to Patient[de-identified] Family  Contact Method: Phone  Phone Number: [phone]  Reason/Outcome: Discharge Planning    Discharge Destination Plan[de-identified] Facility Return, Hospice    Per PA family have decided to pursue comfort care  Per Marleni DIL family wishes for pt to return to [facility] with hospice services  Marleni is in agreement to use facility hospice agency  Marleni and pt son will be flying in tomorrow from [place]  Per Debbie at [facility]  [address], [place], [facility] [phone] they can accept pt with hospice services  All they need is hospice referral  They are contracted with two hospice services and are not allowing outside agencies in due to Arturo

## 2022-09-10 NOTE — PROGRESS NOTES
Progress Note - Tian Pi 80 y o  female MRN: [id number]    Unit/Bed#: [facility] 621-01 Encounter: [id number]      Assessment:  81 y/o F w fall c/b dens fx, SDH, L periprosthetic humerus fx, now w worsening ICH and transfer to comfort measures  Plan:  Continue comfort measures  Diet as tolerated  keppra seizure ppx  F/u hospice consult    Subjective:   Resting comfortably in bed  Objective:     Vitals: Blood pressure 144/90, pulse 62, temperature 97 8 °F (36 6 °C), resp  rate 14, SpO2 94 %  ,There is no height or weight on file to calculate BMI  Intake/Output Summary (Last 24 hours) at 9/10/2022 1013  Last data filed at 9/9/2022 2307  Gross per 24 hour   Intake 100 ml   Output --   Net 100 ml       Physical Exam  General: NAD  HEENT: NC/AT  MMM  Cv: RRR  Lungs: normal effort  Ab: Soft, NT/ND  Ex: no CCE  Neuro: sleeping    Invasive Devices  Report    Peripheral Intravenous Line  Duration           Peripheral IV 09/08/22 Dorsal (posterior); Left Hand 1 day                Lab, Imaging and other studies: I have personally reviewed pertinent reports      VTE Pharmacologic Prophylaxis: Sequential compression device (Venodyne)   VTE Mechanical Prophylaxis: sequential compression device

## 2022-09-10 NOTE — QUICK NOTE
Surgery  Quick note    C collar removed for patient comfort given that the pt is on comfort measure level 4 code status

## 2022-09-10 NOTE — PLAN OF CARE
Problem: MOBILITY - ADULT  Goal: Maintain or return to baseline ADL function  Description: INTERVENTIONS:  -  Assess patient's ability to carry out ADLs; assess patient's baseline for ADL function and identify physical deficits which impact ability to perform ADLs (bathing, care of mouth/teeth, toileting, grooming, dressing, etc )  - Assess/evaluate cause of self-care deficits   - Assess range of motion  - Assess patient's mobility; develop plan if impaired  - Assess patient's need for assistive devices and provide as appropriate  - Encourage maximum independence but intervene and supervise when necessary  - Involve family in performance of ADLs  - Assess for home care needs following discharge   - Consider OT consult to assist with ADL evaluation and planning for discharge  - Provide patient education as appropriate  9/10/2022 1046 by Earl Moreau LPN  Outcome: Progressing  9/10/2022 1046 by Earl Moreau LPN  Outcome: Progressing  Goal: Maintains/Returns to pre admission functional level  Description: INTERVENTIONS:  - Perform BMAT or MOVE assessment daily    - Set and communicate daily mobility goal to care team and patient/family/caregiver  - Collaborate with rehabilitation services on mobility goals if consulted  - Perform Range of Motion 3 times a day  - Reposition patient every 2 hours    -- Record patient progress and toleration of activity level   9/10/2022 1046 by Earl Moreau LPN  Outcome: Progressing  9/10/2022 1046 by Earl Moreau LPN  Outcome: Progressing     Problem: SAFETY,RESTRAINT: NV/NON-SELF DESTRUCTIVE BEHAVIOR  Goal: Remains free of harm/injury (restraint for non violent/non self-detsructive behavior)  Description: INTERVENTIONS:  - Instruct patient/family regarding restraint use   - Assess and monitor physiologic and psychological status   - Provide interventions and comfort measures to meet assessed patient needs   - Identify and implement measures to help patient regain control  - Assess readiness for release of restraint   9/10/2022 1046 by Norma Adams LPN  Outcome: Progressing  9/10/2022 1046 by Norma Adams LPN  Outcome: Progressing  Goal: Returns to optimal restraint-free functioning  Description: INTERVENTIONS:  - Assess the patient's behavior and symptoms that indicate continued need for restraint  - Identify and implement measures to help patient regain control  - Assess readiness for release of restraint   Outcome: Progressing     Problem: PAIN - ADULT  Goal: Verbalizes/displays adequate comfort level or baseline comfort level  Description: Interventions:  - Encourage patient to monitor pain and request assistance  - Assess pain using appropriate pain scale  - Administer analgesics based on type and severity of pain and evaluate response  - Implement non-pharmacological measures as appropriate and evaluate response  - Consider cultural and social influences on pain and pain management  - Notify physician/advanced practitioner if interventions unsuccessful or patient reports new pain  Outcome: Progressing     Problem: INFECTION - ADULT  Goal: Absence or prevention of progression during hospitalization  Description: INTERVENTIONS:  - Assess and monitor for signs and symptoms of infection  - Monitor lab/diagnostic results  - Monitor all insertion sites, i e  indwelling lines, tubes, and drains  - Monitor endotracheal if appropriate and nasal secretions for changes in amount and color  - [place] appropriate cooling/warming therapies per order  - Administer medications as ordered  - Instruct and encourage patient and family to use good hand hygiene technique  - Identify and instruct in appropriate isolation precautions for identified infection/condition  Outcome: Progressing  Goal: Absence of fever/infection during neutropenic period  Description: INTERVENTIONS:  - Monitor WBC    Outcome: Progressing     Problem: SAFETY ADULT  Goal: Maintain or return to baseline ADL function  Description: INTERVENTIONS:  -  Assess patient's ability to carry out ADLs; assess patient's baseline for ADL function and identify physical deficits which impact ability to perform ADLs (bathing, care of mouth/teeth, toileting, grooming, dressing, etc )  - Assess/evaluate cause of self-care deficits   - Assess range of motion  - Assess patient's mobility; develop plan if impaired  - Assess patient's need for assistive devices and provide as appropriate  - Encourage maximum independence but intervene and supervise when necessary  - Involve family in performance of ADLs  - Assess for home care needs following discharge   - Consider OT consult to assist with ADL evaluation and planning for discharge  - Provide patient education as appropriate  9/10/2022 1046 by Edith Grimaldo LPN  Outcome: Progressing  9/10/2022 1046 by Edith Grimaldo LPN  Outcome: Progressing  Goal: Maintains/Returns to pre admission functional level  Description: INTERVENTIONS:  - Perform BMAT or MOVE assessment daily    - Set and communicate daily mobility goal to care team and patient/family/caregiver  - Collaborate with rehabilitation services on mobility goals if consulted  - Reposition patient every 2 hours    - Record patient progress and toleration of activity level   9/10/2022 1046 by Edith Grimaldo LPN  Outcome: Progressing  9/10/2022 1046 by Edith Grimaldo LPN  Outcome: Progressing  Goal: Patient will remain free of falls  Description: INTERVENTIONS:  - Educate patient/family on patient safety including physical limitations  - Instruct patient to call for assistance with activity   - Consult OT/PT to assist with strengthening/mobility   - Keep Call bell within reach  - Keep bed low and locked with side rails adjusted as appropriate  - Keep care items and personal belongings within reach  - Initiate and maintain comfort rounds  - Make Fall Risk Sign visible to staff  - Offer Toileting every 2 Hours, in advance of need  - Initiate/Maintain bedalarm  - Obtain necessary fall risk management equipment:   - Apply yellow socks and bracelet for high fall risk patients  - Consider moving patient to room near nurses station  Outcome: Progressing     Problem: DISCHARGE PLANNING  Goal: Discharge to home or other facility with appropriate resources  Description: INTERVENTIONS:  - Identify barriers to discharge w/patient and caregiver  - Arrange for needed discharge resources and transportation as appropriate  - Identify discharge learning needs (meds, wound care, etc )  - Arrange for interpretive services to assist at discharge as needed  - Refer to Case Management Department for coordinating discharge planning if the patient needs post-hospital services based on physician/advanced practitioner order or complex needs related to functional status, cognitive ability, or social support system  Outcome: Progressing     Problem: Knowledge Deficit  Goal: Patient/family/caregiver demonstrates understanding of disease process, treatment plan, medications, and discharge instructions  Description: Complete learning assessment and assess knowledge base    Interventions:  - Provide teaching at level of understanding  - Provide teaching via preferred learning methods  Outcome: Progressing     Problem: Potential for Falls  Goal: Patient will remain free of falls  Description: INTERVENTIONS:  - Educate patient/family on patient safety including physical limitations  - Instruct patient to call for assistance with activity   - Consult OT/PT to assist with strengthening/mobility   - Keep Call bell within reach  - Keep bed low and locked with side rails adjusted as appropriate  - Keep care items and personal belongings within reach  - Initiate and maintain comfort rounds  - Make Fall Risk Sign visible to staff  - Offer Toileting every 2 Hours, in advance of need  - Initiate/Maintain bedalarm  - Obtain necessary fall risk management equipment: - Apply yellow socks and bracelet for high fall risk patients  - Consider moving patient to room near nurses station  Outcome: Progressing     Problem: Prexisting or High Potential for Compromised Skin Integrity  Goal: Skin integrity is maintained or improved  Description: INTERVENTIONS:  - Identify patients at risk for skin breakdown  - Assess and monitor skin integrity  - Assess and monitor nutrition and hydration status  - Monitor labs   - Assess for incontinence   - Turn and reposition patient  - Assist with mobility/ambulation  - Relieve pressure over bony prominences  - Avoid friction and shearing  - Provide appropriate hygiene as needed including keeping skin clean and dry  - Evaluate need for skin moisturizer/barrier cream  - Collaborate with interdisciplinary team   - Patient/family teaching  - Consider wound care consult   Outcome: Progressing     Problem: Nutrition/Hydration-ADULT  Goal: Nutrient/Hydration intake appropriate for improving, restoring or maintaining nutritional needs  Description: Monitor and assess patient's nutrition/hydration status for malnutrition  Collaborate with interdisciplinary team and initiate plan and interventions as ordered  Monitor patient's weight and dietary intake as ordered or per policy  Utilize nutrition screening tool and intervene as necessary  Determine patient's food preferences and provide high-protein, high-caloric foods as appropriate       INTERVENTIONS:  - Monitor oral intake, urinary output, labs, and treatment plans  - Assess nutrition and hydration status and recommend course of action  - Evaluate amount of meals eaten  - Assist patient with eating if necessary   - Allow adequate time for meals  - Recommend/ encourage appropriate diets, oral nutritional supplements, and vitamin/mineral supplements  - Order, calculate, and assess calorie counts as needed  - Recommend, monitor, and adjust tube feedings and TPN/PPN based on assessed needs  - Assess need for intravenous fluids  - Provide specific nutrition/hydration education as appropriate  - Include patient/family/caregiver in decisions related to nutrition  Outcome: Progressing

## 2022-09-11 NOTE — CASE MANAGEMENT
Case Management Discharge Planning Note    Patient name Kush Breaker  [facility] [address]N [id number]  : 1926 Date 2022       Current Admission Date: 2022  Current Admission Diagnosis:Subdural hematoma, acute [facility])   Patient Active Problem List    Diagnosis Date Noted    Fall 2022    Periprosthetic fracture of shoulder 2022    Dens fracture ([place] [address] ) 2022    Subdural hematoma, acute ([place] [address] ) 2022    Severe episode of recurrent major depressive disorder, without psychotic features ([place] [address] ) 2021    Hypokalemia 2019    Fever 2019    Dementia ([place] [address] ) 2019    CKD (chronic kidney disease) stage 2, GFR 60-89 ml/min 2018    Closed 4-part fracture of proximal humerus, left, initial encounter 2018    Pre-op evaluation 2018    EKG, abnormal 2018    Essential hypertension 2018      LOS (days): 3  Geometric Mean LOS (GMLOS) (days): 4 60  Days to GMLOS:1 9     OBJECTIVE:  Risk of Unplanned Readmission Score: 15 33         Current admission status: Inpatient   Preferred Pharmacy:   [facility]/pharmacy #[id number]- [place], [address]  Phone: [phone] Fax: [phone]    Primary Care Provider: Alice Johnson DO    Primary Insurance: MEDICARE  Secondary Insurance: [employer]    DISCHARGE DETAILS: TC to Debbie, [facility], who states that the facility is contracted with [facility] hospice and [facility]  DIL Marleni and son Kaity Koehler, present at bedside  Both are in agreement with hospice services for patient  However, unsure about which agency to choose  This CM provided family with the names of the agencies which [facility] use, Marleni states she would like to research the agencies, and then get back to CM  Debbie states that patient can return to [facility] as early as tomorrow, if signed on to hospice services

## 2022-09-11 NOTE — PLAN OF CARE
Problem: MOBILITY - ADULT  Goal: Maintain or return to baseline ADL function  Description: INTERVENTIONS:  -  Assess patient's ability to carry out ADLs; assess patient's baseline for ADL function and identify physical deficits which impact ability to perform ADLs (bathing, care of mouth/teeth, toileting, grooming, dressing, etc )  - Assess/evaluate cause of self-care deficits   - Assess range of motion  - Assess patient's mobility; develop plan if impaired  - Assess patient's need for assistive devices and provide as appropriate  - Encourage maximum independence but intervene and supervise when necessary  - Involve family in performance of ADLs  - Assess for home care needs following discharge   - Consider OT consult to assist with ADL evaluation and planning for discharge  - Provide patient education as appropriate  Outcome: Not Progressing  Goal: Maintains/Returns to pre admission functional level  Description: INTERVENTIONS:  - Perform BMAT or MOVE assessment daily    - Set and communicate daily mobility goal to care team and patient/family/caregiver  - Collaborate with rehabilitation services on mobility goals if consulted  - Perform Range of Motion 3 times a day  - Reposition patient every 2 hours    - Dangle patient 3 times a day  - Stand patient 3 times a day  - Ambulate patient 3 times a day  - Out of bed to chair 3 times a day   - Out of bed for meals 3 times a day  - Out of bed for toileting  - Record patient progress and toleration of activity level   Outcome: Not Progressing     Problem: SAFETY,RESTRAINT: NV/NON-SELF DESTRUCTIVE BEHAVIOR  Goal: Remains free of harm/injury (restraint for non violent/non self-detsructive behavior)  Description: INTERVENTIONS:  - Instruct patient/family regarding restraint use   - Assess and monitor physiologic and psychological status   - Provide interventions and comfort measures to meet assessed patient needs   - Identify and implement measures to help patient regain control  - Assess readiness for release of restraint   Outcome: Not Progressing  Goal: Returns to optimal restraint-free functioning  Description: INTERVENTIONS:  - Assess the patient's behavior and symptoms that indicate continued need for restraint  - Identify and implement measures to help patient regain control  - Assess readiness for release of restraint   Outcome: Not Progressing     Problem: PAIN - ADULT  Goal: Verbalizes/displays adequate comfort level or baseline comfort level  Description: Interventions:  - Encourage patient to monitor pain and request assistance  - Assess pain using appropriate pain scale  - Administer analgesics based on type and severity of pain and evaluate response  - Implement non-pharmacological measures as appropriate and evaluate response  - Consider cultural and social influences on pain and pain management  - Notify physician/advanced practitioner if interventions unsuccessful or patient reports new pain  Outcome: Not Progressing     Problem: INFECTION - ADULT  Goal: Absence or prevention of progression during hospitalization  Description: INTERVENTIONS:  - Assess and monitor for signs and symptoms of infection  - Monitor lab/diagnostic results  - Monitor all insertion sites, i e  indwelling lines, tubes, and drains  - Monitor endotracheal if appropriate and nasal secretions for changes in amount and color  - [place] appropriate cooling/warming therapies per order  - Administer medications as ordered  - Instruct and encourage patient and family to use good hand hygiene technique  - Identify and instruct in appropriate isolation precautions for identified infection/condition  Outcome: Not Progressing  Goal: Absence of fever/infection during neutropenic period  Description: INTERVENTIONS:  - Monitor WBC    Outcome: Not Progressing     Problem: SAFETY ADULT  Goal: Maintain or return to baseline ADL function  Description: INTERVENTIONS:  -  Assess patient's ability to carry out ADLs; assess patient's baseline for ADL function and identify physical deficits which impact ability to perform ADLs (bathing, care of mouth/teeth, toileting, grooming, dressing, etc )  - Assess/evaluate cause of self-care deficits   - Assess range of motion  - Assess patient's mobility; develop plan if impaired  - Assess patient's need for assistive devices and provide as appropriate  - Encourage maximum independence but intervene and supervise when necessary  - Involve family in performance of ADLs  - Assess for home care needs following discharge   - Consider OT consult to assist with ADL evaluation and planning for discharge  - Provide patient education as appropriate  Outcome: Not Progressing  Goal: Maintains/Returns to pre admission functional level  Description: INTERVENTIONS:  - Perform BMAT or MOVE assessment daily    - Set and communicate daily mobility goal to care team and patient/family/caregiver  - Collaborate with rehabilitation services on mobility goals if consulted  - Perform Range of Motion 3 times a day  - Reposition patient every 2 hours    - Dangle patient 3 times a day  - Stand patient 3 times a day  - Ambulate patient 3 times a day  - Out of bed to chair 3 times a day   - Out of bed for meals 3 times a day  - Out of bed for toileting  - Record patient progress and toleration of activity level   Outcome: Not Progressing  Goal: Patient will remain free of falls  Description: INTERVENTIONS:  - Educate patient/family on patient safety including physical limitations  - Instruct patient to call for assistance with activity   - Consult OT/PT to assist with strengthening/mobility   - Keep Call bell within reach  - Keep bed low and locked with side rails adjusted as appropriate  - Keep care items and personal belongings within reach  - Initiate and maintain comfort rounds  - Make Fall Risk Sign visible to staff  - Offer Toileting every 2 Hours, in advance of need  - Initiate/Maintain bedalarm  - Obtain necessary fall risk management equipment:   - Apply yellow socks and bracelet for high fall risk patients  - Consider moving patient to room near nurses station  Outcome: Not Progressing     Problem: DISCHARGE PLANNING  Goal: Discharge to home or other facility with appropriate resources  Description: INTERVENTIONS:  - Identify barriers to discharge w/patient and caregiver  - Arrange for needed discharge resources and transportation as appropriate  - Identify discharge learning needs (meds, wound care, etc )  - Arrange for interpretive services to assist at discharge as needed  - Refer to Case Management Department for coordinating discharge planning if the patient needs post-hospital services based on physician/advanced practitioner order or complex needs related to functional status, cognitive ability, or social support system  Outcome: Not Progressing     Problem: Knowledge Deficit  Goal: Patient/family/caregiver demonstrates understanding of disease process, treatment plan, medications, and discharge instructions  Description: Complete learning assessment and assess knowledge base    Interventions:  - Provide teaching at level of understanding  - Provide teaching via preferred learning methods  Outcome: Not Progressing     Problem: Potential for Falls  Goal: Patient will remain free of falls  Description: INTERVENTIONS:  - Educate patient/family on patient safety including physical limitations  - Instruct patient to call for assistance with activity   - Consult OT/PT to assist with strengthening/mobility   - Keep Call bell within reach  - Keep bed low and locked with side rails adjusted as appropriate  - Keep care items and personal belongings within reach  - Initiate and maintain comfort rounds  - Make Fall Risk Sign visible to staff  - Offer Toileting every 2 Hours, in advance of need  - Initiate/Maintain bedalarm  - Obtain necessary fall risk management equipment:   - Apply yellow socks and bracelet for high fall risk patients  - Consider moving patient to room near nurses station  Outcome: Not Progressing     Problem: Prexisting or High Potential for Compromised Skin Integrity  Goal: Skin integrity is maintained or improved  Description: INTERVENTIONS:  - Identify patients at risk for skin breakdown  - Assess and monitor skin integrity  - Assess and monitor nutrition and hydration status  - Monitor labs   - Assess for incontinence   - Turn and reposition patient  - Assist with mobility/ambulation  - Relieve pressure over bony prominences  - Avoid friction and shearing  - Provide appropriate hygiene as needed including keeping skin clean and dry  - Evaluate need for skin moisturizer/barrier cream  - Collaborate with interdisciplinary team   - Patient/family teaching  - Consider wound care consult   Outcome: Not Progressing     Problem: Nutrition/Hydration-ADULT  Goal: Nutrient/Hydration intake appropriate for improving, restoring or maintaining nutritional needs  Description: Monitor and assess patient's nutrition/hydration status for malnutrition  Collaborate with interdisciplinary team and initiate plan and interventions as ordered  Monitor patient's weight and dietary intake as ordered or per policy  Utilize nutrition screening tool and intervene as necessary  Determine patient's food preferences and provide high-protein, high-caloric foods as appropriate       INTERVENTIONS:  - Monitor oral intake, urinary output, labs, and treatment plans  - Assess nutrition and hydration status and recommend course of action  - Evaluate amount of meals eaten  - Assist patient with eating if necessary   - Allow adequate time for meals  - Recommend/ encourage appropriate diets, oral nutritional supplements, and vitamin/mineral supplements  - Order, calculate, and assess calorie counts as needed  - Recommend, monitor, and adjust tube feedings and TPN/PPN based on assessed needs  - Assess need for intravenous fluids  - Provide specific nutrition/hydration education as appropriate  - Include patient/family/caregiver in decisions related to nutrition  Outcome: Not Progressing

## 2022-09-11 NOTE — SPEECH THERAPY NOTE
Speech Language/Pathology    Pt transitioned to level 4-comfort measures  Diet per pt desires   ST to d/c orders at this time

## 2022-09-12 NOTE — CASE MANAGEMENT
Case Management Discharge Planning Note    Patient name Sara Torres  Location [address] [place] [address] [address] 321[address]84 MRN [id number]  : 1926 Date 2022       Current Admission Date: 2022  Current Admission Diagnosis:Subdural hematoma, acute [facility])   Patient Active Problem List    Diagnosis Date Noted    Fall 2022    Periprosthetic fracture of shoulder 2022    Dens fracture ([place] [address] ) 2022    Subdural hematoma, acute ([place] [address] ) 2022    Severe episode of recurrent major depressive disorder, without psychotic features ([place] [address] ) 2021    Hypokalemia 2019    Fever 2019    Dementia ([place] [address] ) 2019    CKD (chronic kidney disease) stage 2, GFR 60-89 ml/min 2018    Closed 4-part fracture of proximal humerus, left, initial encounter 2018    Pre-op evaluation 2018    EKG, abnormal 2018    Essential hypertension 2018      LOS (days): 4  Geometric Mean LOS (GMLOS) (days): 4 60  Days to GMLOS:0 7     OBJECTIVE:  Risk of Unplanned Readmission Score: 11 53         Current admission status: Inpatient   Preferred Pharmacy:   [facility]/pharmacy #[id number]- [id number]  Phone: [phone] Fax: [phone]    Primary Care Provider: René Holliday DO    Primary Insurance: MEDICARE  Secondary Insurance: Amrit Arriaga    DISCHARGE DETAILS: DIL, Marleni and [facility], stating that they are not comfortable at this time taking patient back to facility under hospice services  DIL requesting referral entered for  hospice, referral entered in [address]

## 2022-09-12 NOTE — PLAN OF CARE
Problem: MOBILITY - ADULT  Goal: Maintain or return to baseline ADL function  Description: INTERVENTIONS:  -  Assess patient's ability to carry out ADLs; assess patient's baseline for ADL function and identify physical deficits which impact ability to perform ADLs (bathing, care of mouth/teeth, toileting, grooming, dressing, etc )  - Assess/evaluate cause of self-care deficits   - Assess range of motion  - Assess patient's mobility; develop plan if impaired  - Assess patient's need for assistive devices and provide as appropriate  - Encourage maximum independence but intervene and supervise when necessary  - Involve family in performance of ADLs  - Assess for home care needs following discharge   - Consider OT consult to assist with ADL evaluation and planning for discharge  - Provide patient education as appropriate  Outcome: Not Progressing  Goal: Maintains/Returns to pre admission functional level  Description: INTERVENTIONS:  - Perform BMAT or MOVE assessment daily    - Set and communicate daily mobility goal to care team and patient/family/caregiver  - Collaborate with rehabilitation services on mobility goals if consulted  - Perform Range of Motion 3 times a day  - Reposition patient every 2 hours    - Dangle patient 3 times a day  - Stand patient 3 times a day  - Ambulate patient 3 times a day  - Out of bed to chair 3 times a day   - Out of bed for meals 3 times a day  - Out of bed for toileting  - Record patient progress and toleration of activity level   Outcome: Not Progressing     Problem: SAFETY,RESTRAINT: NV/NON-SELF DESTRUCTIVE BEHAVIOR  Goal: Remains free of harm/injury (restraint for non violent/non self-detsructive behavior)  Description: INTERVENTIONS:  - Instruct patient/family regarding restraint use   - Assess and monitor physiologic and psychological status   - Provide interventions and comfort measures to meet assessed patient needs   - Identify and implement measures to help patient regain control  - Assess readiness for release of restraint   Outcome: Not Progressing  Goal: Returns to optimal restraint-free functioning  Description: INTERVENTIONS:  - Assess the patient's behavior and symptoms that indicate continued need for restraint  - Identify and implement measures to help patient regain control  - Assess readiness for release of restraint   Outcome: Not Progressing     Problem: PAIN - ADULT  Goal: Verbalizes/displays adequate comfort level or baseline comfort level  Description: Interventions:  - Encourage patient to monitor pain and request assistance  - Assess pain using appropriate pain scale  - Administer analgesics based on type and severity of pain and evaluate response  - Implement non-pharmacological measures as appropriate and evaluate response  - Consider cultural and social influences on pain and pain management  - Notify physician/advanced practitioner if interventions unsuccessful or patient reports new pain  Outcome: Not Progressing     Problem: INFECTION - ADULT  Goal: Absence or prevention of progression during hospitalization  Description: INTERVENTIONS:  - Assess and monitor for signs and symptoms of infection  - Monitor lab/diagnostic results  - Monitor all insertion sites, i e  indwelling lines, tubes, and drains  - Monitor endotracheal if appropriate and nasal secretions for changes in amount and color  - [place] appropriate cooling/warming therapies per order  - Administer medications as ordered  - Instruct and encourage patient and family to use good hand hygiene technique  - Identify and instruct in appropriate isolation precautions for identified infection/condition  Outcome: Not Progressing  Goal: Absence of fever/infection during neutropenic period  Description: INTERVENTIONS:  - Monitor WBC    Outcome: Not Progressing     Problem: SAFETY ADULT  Goal: Maintain or return to baseline ADL function  Description: INTERVENTIONS:  -  Assess patient's ability to carry out ADLs; assess patient's baseline for ADL function and identify physical deficits which impact ability to perform ADLs (bathing, care of mouth/teeth, toileting, grooming, dressing, etc )  - Assess/evaluate cause of self-care deficits   - Assess range of motion  - Assess patient's mobility; develop plan if impaired  - Assess patient's need for assistive devices and provide as appropriate  - Encourage maximum independence but intervene and supervise when necessary  - Involve family in performance of ADLs  - Assess for home care needs following discharge   - Consider OT consult to assist with ADL evaluation and planning for discharge  - Provide patient education as appropriate  Outcome: Not Progressing  Goal: Maintains/Returns to pre admission functional level  Description: INTERVENTIONS:  - Perform BMAT or MOVE assessment daily    - Set and communicate daily mobility goal to care team and patient/family/caregiver  - Collaborate with rehabilitation services on mobility goals if consulted  - Perform Range of Motion 3 times a day  - Reposition patient every 2 hours    - Dangle patient 3 times a day  - Stand patient 3 times a day  - Ambulate patient 3 times a day  - Out of bed to chair 3 times a day   - Out of bed for meals 3 times a day  - Out of bed for toileting  - Record patient progress and toleration of activity level   Outcome: Not Progressing  Goal: Patient will remain free of falls  Description: INTERVENTIONS:  - Educate patient/family on patient safety including physical limitations  - Instruct patient to call for assistance with activity   - Consult OT/PT to assist with strengthening/mobility   - Keep Call bell within reach  - Keep bed low and locked with side rails adjusted as appropriate  - Keep care items and personal belongings within reach  - Initiate and maintain comfort rounds  - Make Fall Risk Sign visible to staff  - Offer Toileting every 2 Hours, in advance of need  - Initiate/Maintain bedalarm  - Obtain necessary fall risk management equipment:   - Apply yellow socks and bracelet for high fall risk patients  - Consider moving patient to room near nurses station  Outcome: Not Progressing     Problem: DISCHARGE PLANNING  Goal: Discharge to home or other facility with appropriate resources  Description: INTERVENTIONS:  - Identify barriers to discharge w/patient and caregiver  - Arrange for needed discharge resources and transportation as appropriate  - Identify discharge learning needs (meds, wound care, etc )  - Arrange for interpretive services to assist at discharge as needed  - Refer to Case Management Department for coordinating discharge planning if the patient needs post-hospital services based on physician/advanced practitioner order or complex needs related to functional status, cognitive ability, or social support system  Outcome: Not Progressing     Problem: Knowledge Deficit  Goal: Patient/family/caregiver demonstrates understanding of disease process, treatment plan, medications, and discharge instructions  Description: Complete learning assessment and assess knowledge base    Interventions:  - Provide teaching at level of understanding  - Provide teaching via preferred learning methods  Outcome: Not Progressing     Problem: Potential for Falls  Goal: Patient will remain free of falls  Description: INTERVENTIONS:  - Educate patient/family on patient safety including physical limitations  - Instruct patient to call for assistance with activity   - Consult OT/PT to assist with strengthening/mobility   - Keep Call bell within reach  - Keep bed low and locked with side rails adjusted as appropriate  - Keep care items and personal belongings within reach  - Initiate and maintain comfort rounds  - Make Fall Risk Sign visible to staff  - Offer Toileting every 2 Hours, in advance of need  - Initiate/Maintain bedalarm  - Obtain necessary fall risk management equipment:   - Apply yellow socks and bracelet for high fall risk patients  - Consider moving patient to room near nurses station  Outcome: Not Progressing     Problem: Prexisting or High Potential for Compromised Skin Integrity  Goal: Skin integrity is maintained or improved  Description: INTERVENTIONS:  - Identify patients at risk for skin breakdown  - Assess and monitor skin integrity  - Assess and monitor nutrition and hydration status  - Monitor labs   - Assess for incontinence   - Turn and reposition patient  - Assist with mobility/ambulation  - Relieve pressure over bony prominences  - Avoid friction and shearing  - Provide appropriate hygiene as needed including keeping skin clean and dry  - Evaluate need for skin moisturizer/barrier cream  - Collaborate with interdisciplinary team   - Patient/family teaching  - Consider wound care consult   Outcome: Not Progressing     Problem: Nutrition/Hydration-ADULT  Goal: Nutrient/Hydration intake appropriate for improving, restoring or maintaining nutritional needs  Description: Monitor and assess patient's nutrition/hydration status for malnutrition  Collaborate with interdisciplinary team and initiate plan and interventions as ordered  Monitor patient's weight and dietary intake as ordered or per policy  Utilize nutrition screening tool and intervene as necessary  Determine patient's food preferences and provide high-protein, high-caloric foods as appropriate       INTERVENTIONS:  - Monitor oral intake, urinary output, labs, and treatment plans  - Assess nutrition and hydration status and recommend course of action  - Evaluate amount of meals eaten  - Assist patient with eating if necessary   - Allow adequate time for meals  - Recommend/ encourage appropriate diets, oral nutritional supplements, and vitamin/mineral supplements  - Order, calculate, and assess calorie counts as needed  - Recommend, monitor, and adjust tube feedings and TPN/PPN based on assessed needs  - Assess need for intravenous fluids  - Provide specific nutrition/hydration education as appropriate  - Include patient/family/caregiver in decisions related to nutrition  Outcome: Not Progressing

## 2022-09-12 NOTE — PROGRESS NOTES
Progress Note - [place] 80 y o  female MRN: [id number]    Unit/Bed#: [facility] 621-01 Encounter: [id number]      Assessment:  79 y/o F w head strike on plavix c/b SDH, C2 fx, and R periprosthetic humerus fx  Worsening ICH and transition to comfort care  Plan:  Continue comfort order set  Pleasure feeds  Discussed with case mgt, pt will discharge to hospice on 9/13    Subjective:   No acute events  Objective:     Vitals: Blood pressure 144/90, pulse 60, temperature 97 8 °F (36 6 °C), resp  rate 14, SpO2 94 %  ,There is no height or weight on file to calculate BMI  No intake or output data in the 24 hours ending 09/12/22 1229    Physical Exam  General: NAD  HEENT: NC/AT  MMM  Cv: RRR  Lungs: normal effort  Ab: Soft, NT/ND  Ex: no CCE  Neuro: calm, resting comfortably      Invasive Devices  Report    Peripheral Intravenous Line  Duration           Peripheral IV 09/08/22 Dorsal (posterior); Left Hand 3 days                Lab, Imaging and other studies: I have personally reviewed pertinent reports      VTE Pharmacologic Prophylaxis: Reason for no pharmacologic prophylaxis comfort care  VTE Mechanical Prophylaxis: reason for no mechanical VTE prophylaxis comfort care

## 2022-09-12 NOTE — CASE MANAGEMENT
Case Management Discharge Planning Note    Patient name Reji Lane  Location [address] [place] [address] [facility] [id number]  : 1926 Date 2022       Current Admission Date: 2022  Current Admission Diagnosis:Subdural hematoma, acute [facility])   Patient Active Problem List    Diagnosis Date Noted    Fall 2022    Periprosthetic fracture of shoulder 2022    Dens fracture ([facility] ) 2022    Subdural hematoma, acute ([place] [address] ) 2022    Severe episode of recurrent major depressive disorder, without psychotic features ([place] [address] ) 2021    Hypokalemia 2019    Fever 2019    Dementia ([place] [address] ) 2019    CKD (chronic kidney disease) stage 2, GFR 60-89 ml/min 2018    Closed 4-part fracture of proximal humerus, left, initial encounter 2018    Pre-op evaluation 2018    EKG, abnormal 2018    Essential hypertension 2018      LOS (days): 4  Geometric Mean LOS (GMLOS) (days): 4 60  Days to GMLOS:0 9     OBJECTIVE:  Risk of Unplanned Readmission Score: 12 62         Current admission status: Inpatient   Preferred Pharmacy:   [facility] #[id number] [address]  Phone: [phone] Fax: [phone]    Primary Care Provider: Paramjit Garay DO    Primary Insurance: MEDICARE  Secondary Insurance: AETNA    DISCHARGE DETAILS:    Discharge planning discussed with[de-identified] DIL Marleni  Freedom of Choice: Yes  Comments - Freedom of Choice: Discussed fOC  CM contacted family/caregiver?: Yes  Were Treatment Team discharge recommendations reviewed with patient/caregiver?: Yes  Did patient/caregiver verbalize understanding of patient care needs?: Yes  Were patient/caregiver advised of the risks associated with not following Treatment Team discharge recommendations?: Yes    Other Referral/Resources/Interventions Provided:  Interventions: Hospice  Referral Comments: Vi Crespo states that she is in agreement with [facility] hospice services to start at [facility] tomorrow 9/13, per facility  [facility], inquiring as to whether they can accept, as they are not in [address]  [facility] states able to accept tomorrow, and will order DME to be delivered to facility prior to patient's arrival   H and P faxed to [phone] ([facility])  [facility] # is [phone]  TC to L[facility], [facility], in agreement with DCP  Trauma service notified that order needs to be on the AVS for "eval and treat for hospice services"    IMM Given (Date):: 09/12/22  IMM Given to[de-identified] Family     IMM reviewed with patient's caregiver, patient's caregiver agrees with discharge determination

## 2022-09-12 NOTE — CASE MANAGEMENT
Case Management Discharge Planning Note    Patient name Ralph Montiel  Location [address] [place] [address] Rd 621/[facility] 436-61 MRN [id number]  : 1926 Date 2022       Current Admission Date: 2022  Current Admission Diagnosis:Subdural hematoma, acute [facility])   Patient Active Problem List    Diagnosis Date Noted    Fall 2022    Periprosthetic fracture of shoulder 2022    Dens fracture ([place] [address] ) 2022    Subdural hematoma, acute ([place] [address] ) 2022    Severe episode of recurrent major depressive disorder, without psychotic features ([place] [address] ) 2021    Hypokalemia 2019    Fever 2019    Dementia ([place] [address] ) 2019    CKD (chronic kidney disease) stage 2, GFR 60-89 ml/min 2018    Closed 4-part fracture of proximal humerus, left, initial encounter 2018    Pre-op evaluation 2018    EKG, abnormal 2018    Essential hypertension 2018      LOS (days): 4  Geometric Mean LOS (GMLOS) (days): 4 60  Days to GMLOS:0 9     OBJECTIVE:  Risk of Unplanned Readmission Score: 11 5         Current admission status: Inpatient   Preferred Pharmacy:   [facility]/pharmacy #[id number] [address]  Phone: [phone] Fax: [phone]    Primary Care Provider: Shahnaz York DO    Primary Insurance: MEDICARE  Secondary Insurance: AETNA    DISCHARGE DETAILS:    TC to [facility], [facility], able to accept patient back to facility under hospice services tomorrow before 1400  Transportation set up through round trip    H +P and face sheet faxed to [facility] at [phone], fax confirmation received

## 2022-09-12 NOTE — PROGRESS NOTES
Progress Note - Trauma   Ayad Vasquez 80 y o  female [id number]   Unit/Bed#: [facility] 621-01 Encounter: [id number]     ASSESSMENT:   Patient Active Problem List   Diagnosis    Closed 4-part fracture of proximal humerus, left, initial encounter    Pre-op evaluation    EKG, abnormal    Essential hypertension    CKD (chronic kidney disease) stage 2, GFR 60-89 ml/min    Fever    Dementia (HCC)    Hypokalemia    Severe episode of recurrent major depressive disorder, without psychotic features ([facility] )    Fall    Periprosthetic fracture of shoulder    Dens fracture ([facility] )    Subdural hematoma, acute ([facility] )      Comfort care    PLAN:  Continue Comfort care measurements  - dilaudid  - robinul  - pleasure feeds  - pending hospice placement    Disposition:  Pending placement    SUBJECTIVE:  Chief Complaint: SDH    Subjective: Today patient is resting comfortably, unable to provide history  OBJECTIVE:   Vitals:   HR:  [60] 60  Resp:  [14] 14    Intake/Output:  I/O       09/10 0701  09/11 0700 09/11 0701 09/12 0700    P  O  0 0    I V       IV Piggyback 100 100    Total Intake 100 100    Urine 550     Stool 0     Total Output 550     Net -450 +100          Unmeasured Urine Occurrence 5 x     Unmeasured Stool Occurrence 2 x          Nutrition: Diet Regular; Pleasure Feed  GI Prophylaxis/Bowel Regimen:  Dulcolax p r n  VTE Prophylaxis:Reason for no pharmacologic prophylaxis comfort care     Physical Exam:   GENERAL APPEARANCE:  No acute distress  NEURO:  Breathing spontaneously, not answering questions  HEENT:  Normocephalic  CV:  Regular rate and rhythm  LUNGS:  Clear to auscultation bilaterally  GI:  Nondistended nontender  MSK:  No lower extremity edema  SKIN:  Warm and dry    Invasive Devices  Report    Peripheral Intravenous Line  Duration           Peripheral IV 09/08/22 Dorsal (posterior); Left Hand 3 days                      Lab Results: Results: I have personally reviewed all pertinent laboratory/tests results  Imaging/EKG Studies: I have personally reviewed pertinent reports

## 2022-09-13 NOTE — PROGRESS NOTES
Progress Note - Trauma   Shawna Amato 80 y o  female [id number]   Unit/Bed#: [facility] 621-01 Encounter: [id number]     ASSESSMENT:   Patient Active Problem List   Diagnosis    Closed 4-part fracture of proximal humerus, left, initial encounter    Pre-op evaluation    EKG, abnormal    Essential hypertension    CKD (chronic kidney disease) stage 2, GFR 60-89 ml/min    Fever    Dementia (HCC)    Hypokalemia    Severe episode of recurrent major depressive disorder, without psychotic features ([facility] )    Fall    Periprosthetic fracture of shoulder    Dens fracture ([facility] )    Subdural hematoma, acute ([facility] )      81 y/o F s/p head injury on plavix with resulting SDH, C2 fx, and R humerus fracture  Course complicated by worsening ICH  Patient transitioned to comfort care  PLAN:  Continue comfort care   Plan to discharge to hospice today    Disposition: discharge to hospice    SUBJECTIVE:  Chief Complaint: none    Subjective: Patient resting comfortably  No acute overnight events  OBJECTIVE:   Vitals:   Temp:  [97 7 °F (36 5 °C)] 97 7 °F (36 5 °C)  HR:  [58] 58  Resp:  [18] 18  BP: (134)/(86) 134/86    Intake/Output:  I/O       09/11 0701  09/12 0700 09/12 0701  09/13 0700 09/13 0701  09/14 0700    P  O  0      IV Piggyback 100      Total Intake 100      Urine       Stool       Total Output       Net +100                  Nutrition: Diet Regular; Pleasure Feed  GI Prophylaxis/Bowel Regimen: per comfort care order set  VTE Prophylaxis:Reason for no pharmacologic prophylaxis patient comfort measures     Physical Exam:   General: No acute distress, resting comfortably  HEENT: Moist mucus membranes  CV: Regular rate  Pulm: No respiratory distress  MSK: no obvious MSK deformities  Skin: Warm and dry    Invasive Devices  Report    Peripheral Intravenous Line  Duration           Peripheral IV 09/08/22 Dorsal (posterior); Left Hand 4 days                      Lab Results: no new  Imaging/EKG Studies: no new   Other Studies: NA

## 2022-09-13 NOTE — CASE COMMUNICATION
Yanet SHEA 8 29 26 is a 79 y/o female s/p head injury on plavix with resulting SDH, C2 fx, and R humerus fracture  Course complicated by worsening ICH  Patient transitioned to comfort care  PPS 10  RLOC?

## 2022-09-13 NOTE — OCCUPATIONAL THERAPY NOTE
Occupational Therapy Treatment Note:      09/13/22 0800   OT Last Visit   OT Visit Date 09/13/22   Note Type   Cancel Reasons   (plan to d/c acute ot services as pt is for  xfer to hospice)

## 2022-09-14 NOTE — PLAN OF CARE
Problem: MOBILITY - ADULT  Goal: Maintain or return to baseline ADL function  Description: INTERVENTIONS:  -  Assess patient's ability to carry out ADLs; assess patient's baseline for ADL function and identify physical deficits which impact ability to perform ADLs (bathing, care of mouth/teeth, toileting, grooming, dressing, etc )  - Assess/evaluate cause of self-care deficits   - Assess range of motion  - Assess patient's mobility; develop plan if impaired  - Assess patient's need for assistive devices and provide as appropriate  - Encourage maximum independence but intervene and supervise when necessary  - Involve family in performance of ADLs  - Assess for home care needs following discharge   - Consider OT consult to assist with ADL evaluation and planning for discharge  - Provide patient education as appropriate  Outcome: Progressing  Goal: Maintains/Returns to pre admission functional level  Description: INTERVENTIONS:  - Perform BMAT or MOVE assessment daily    - Set and communicate daily mobility goal to care team and patient/family/caregiver  - Collaborate with rehabilitation services on mobility goals if consulted  - Perform Range of Motion 3 times a day  - Reposition patient every 2 hours    - Dangle patient 3 times a day  - Stand patient 3 times a day  - Ambulate patient 3 times a day  - Out of bed to chair 3 times a day   - Out of bed for meals 3 times a day  - Out of bed for toileting  - Record patient progress and toleration of activity level   Outcome: Progressing     Problem: PAIN - ADULT  Goal: Verbalizes/displays adequate comfort level or baseline comfort level  Description: Interventions:  - Encourage patient to monitor pain and request assistance  - Assess pain using appropriate pain scale  - Administer analgesics based on type and severity of pain and evaluate response  - Implement non-pharmacological measures as appropriate and evaluate response  - Consider cultural and social influences on pain and pain management  - Notify physician/advanced practitioner if interventions unsuccessful or patient reports new pain  Outcome: Progressing     Problem: INFECTION - ADULT  Goal: Absence or prevention of progression during hospitalization  Description: INTERVENTIONS:  - Assess and monitor for signs and symptoms of infection  - Monitor lab/diagnostic results  - Monitor all insertion sites, i e  indwelling lines, tubes, and drains  - Monitor endotracheal if appropriate and nasal secretions for changes in amount and color  - Maybell appropriate cooling/warming therapies per order  - Administer medications as ordered  - Instruct and encourage patient and family to use good hand hygiene technique  - Identify and instruct in appropriate isolation precautions for identified infection/condition  Outcome: Progressing  Goal: Absence of fever/infection during neutropenic period  Description: INTERVENTIONS:  - Monitor WBC    Outcome: Progressing     Problem: SAFETY ADULT  Goal: Maintain or return to baseline ADL function  Description: INTERVENTIONS:  -  Assess patient's ability to carry out ADLs; assess patient's baseline for ADL function and identify physical deficits which impact ability to perform ADLs (bathing, care of mouth/teeth, toileting, grooming, dressing, etc )  - Assess/evaluate cause of self-care deficits   - Assess range of motion  - Assess patient's mobility; develop plan if impaired  - Assess patient's need for assistive devices and provide as appropriate  - Encourage maximum independence but intervene and supervise when necessary  - Involve family in performance of ADLs  - Assess for home care needs following discharge   - Consider OT consult to assist with ADL evaluation and planning for discharge  - Provide patient education as appropriate  Outcome: Progressing  Goal: Maintains/Returns to pre admission functional level  Description: INTERVENTIONS:  - Perform BMAT or MOVE assessment daily    - Set and communicate daily mobility goal to care team and patient/family/caregiver  - Collaborate with rehabilitation services on mobility goals if consulted  - Perform Range of Motion 3 times a day  - Reposition patient every 2 hours    - Dangle patient 3 times a day  - Stand patient 3 times a day  - Ambulate patient 3 times a day  - Out of bed to chair 3 times a day   - Out of bed for meals 3 times a day  - Out of bed for toileting  - Record patient progress and toleration of activity level   Outcome: Progressing  Goal: Patient will remain free of falls  Description: INTERVENTIONS:  - Educate patient/family on patient safety including physical limitations  - Instruct patient to call for assistance with activity   - Consult OT/PT to assist with strengthening/mobility   - Keep Call bell within reach  - Keep bed low and locked with side rails adjusted as appropriate  - Keep care items and personal belongings within reach  - Initiate and maintain comfort rounds  - Make Fall Risk Sign visible to staff  - Offer Toileting every 2 Hours, in advance of need  - Initiate/Maintain bed alarm  - Obtain necessary fall risk management equipment  - Apply yellow socks and bracelet for high fall risk patients  - Consider moving patient to room near nurses station  Outcome: Progressing     Problem: DISCHARGE PLANNING  Goal: Discharge to home or other facility with appropriate resources  Description: INTERVENTIONS:  - Identify barriers to discharge w/patient and caregiver  - Arrange for needed discharge resources and transportation as appropriate  - Identify discharge learning needs (meds, wound care, etc )  - Arrange for interpretive services to assist at discharge as needed  - Refer to Case Management Department for coordinating discharge planning if the patient needs post-hospital services based on physician/advanced practitioner order or complex needs related to functional status, cognitive ability, or social support system  Outcome: Progressing     Problem: Knowledge Deficit  Goal: Patient/family/caregiver demonstrates understanding of disease process, treatment plan, medications, and discharge instructions  Description: Complete learning assessment and assess knowledge base    Interventions:  - Provide teaching at level of understanding  - Provide teaching via preferred learning methods  Outcome: Progressing     Problem: Potential for Falls  Goal: Patient will remain free of falls  Description: INTERVENTIONS:  - Educate patient/family on patient safety including physical limitations  - Instruct patient to call for assistance with activity   - Consult OT/PT to assist with strengthening/mobility   - Keep Call bell within reach  - Keep bed low and locked with side rails adjusted as appropriate  - Keep care items and personal belongings within reach  - Initiate and maintain comfort rounds  - Make Fall Risk Sign visible to staff  - Offer Toileting every 2 Hours, in advance of need  - Initiate/Maintain bed alarm  - Obtain necessary fall risk management equipment  - Apply yellow socks and bracelet for high fall risk patients  - Consider moving patient to room near nurses station  Outcome: Progressing     Problem: Prexisting or High Potential for Compromised Skin Integrity  Goal: Skin integrity is maintained or improved  Description: INTERVENTIONS:  - Identify patients at risk for skin breakdown  - Assess and monitor skin integrity  - Assess and monitor nutrition and hydration status  - Monitor labs   - Assess for incontinence   - Turn and reposition patient  - Assist with mobility/ambulation  - Relieve pressure over bony prominences  - Avoid friction and shearing  - Provide appropriate hygiene as needed including keeping skin clean and dry  - Evaluate need for skin moisturizer/barrier cream  - Collaborate with interdisciplinary team   - Patient/family teaching  - Consider wound care consult   Outcome: Progressing     Problem: Nutrition/Hydration-ADULT  Goal: Nutrient/Hydration intake appropriate for improving, restoring or maintaining nutritional needs  Description: Monitor and assess patient's nutrition/hydration status for malnutrition  Collaborate with interdisciplinary team and initiate plan and interventions as ordered  Monitor patient's weight and dietary intake as ordered or per policy  Utilize nutrition screening tool and intervene as necessary  Determine patient's food preferences and provide high-protein, high-caloric foods as appropriate       INTERVENTIONS:  - Monitor oral intake, urinary output, labs, and treatment plans  - Assess nutrition and hydration status and recommend course of action  - Evaluate amount of meals eaten  - Assist patient with eating if necessary   - Allow adequate time for meals  - Recommend/ encourage appropriate diets, oral nutritional supplements, and vitamin/mineral supplements  - Order, calculate, and assess calorie counts as needed  - Recommend, monitor, and adjust tube feedings and TPN/PPN based on assessed needs  - Assess need for intravenous fluids  - Provide specific nutrition/hydration education as appropriate  - Include patient/family/caregiver in decisions related to nutrition  Outcome: Progressing

## 2022-09-14 NOTE — PHYSICAL THERAPY NOTE
Physical Therapy Cancellation Note    Informed that pt is opting for comfort care measures    Will d/c acute hospital PT services at this time    Pepe Acharya

## 2022-09-14 NOTE — CASE MANAGEMENT
Case Management Discharge Planning Note    Patient name Siva Jones  [facility] [address] [facility] [id number]  : 1926 Date 2022       Current Admission Date: 2022  Current Admission Diagnosis:Subdural hematoma, acute [facility])   Patient Active Problem List    Diagnosis Date Noted    Fall 2022    Periprosthetic fracture of shoulder 2022    Dens fracture ([place] [address] ) 2022    Subdural hematoma, acute ([place] [address] ) 2022    Severe episode of recurrent major depressive disorder, without psychotic features ([place] [address] ) 2021    Hypokalemia 2019    Fever 2019    Dementia ([place] [address] ) 2019    CKD (chronic kidney disease) stage 2, GFR 60-89 ml/min 2018    Closed 4-part fracture of proximal humerus, left, initial encounter 2018    Pre-op evaluation 2018    EKG, abnormal 2018    Essential hypertension 2018      LOS (days): 6  Geometric Mean LOS (GMLOS) (days): 4 60  Days to GMLOS:-1     OBJECTIVE:  Risk of Unplanned Readmission Score: 11 83         Current admission status: Inpatient   Preferred Pharmacy:   [facility]/pharmacy #[id number] [address]  Phone: [phone] Fax: [phone]    Primary Care Provider: Ton Petty DO    Primary Insurance: MEDICARE  Secondary Insurance: [id number]: Late entry from 22  TT to trauma team, requesting that patient be changed to oral medications to trial efficacy before patient can potentially transition back to [facility] with home hospice service     hospice will continue to follow for [facility] appropriateness

## 2022-09-14 NOTE — PROGRESS NOTES
[address] [facility]  Progress Note Cele Nava 8/29/1926, 80 y o  female MRN: [id number]  Unit/Bed#: [facility] 621-01 Encounter: [id number]  Primary Care Provider: Iona Barros DO   Date and time admitted to hospital: 9/8/2022  6:49 PM    Dens fracture [facility])  Assessment & Plan  9/8 CT cervical spine: New acute C2 fracture involving base of dens  Plan:  - comfort care    Periprosthetic fracture of shoulder  Assessment & Plan  9/8 Xray left humerus: Findings indeterminate for a small nondisplaced periprosthetic fracture along the proximal aspect of the humerus  Plan:  - comfort care    Fall  Assessment & Plan  Witnessed fall  Struck her head on dining table  On plavix    Dementia ([facility] [address] )  Assessment & Plan  At baseline  Unable to provide history  - Patients family states she has been declining over the last few years  CKD (chronic kidney disease) stage 2, GFR 60-89 ml/min  Assessment & Plan  Lab Results   Component Value Date    EGFR 42 09/09/2022    EGFR 43 09/08/2022    EGFR 38 09/08/2022    CREATININE 1 10 09/09/2022    CREATININE 1 07 09/08/2022    CREATININE 1 20 09/08/2022     Monitor BUN/Cr    Essential hypertension  Assessment & Plan  Propanolol 160mg PO  Goal SBP < 140  * Subdural hematoma, acute [facility])  Assessment & Plan  9/8 CT Head: 3 0 cm acute mixed-density hemorrhagic brain contusion of right anterior-inferior frontal lobe with moderate surrounding vasogenic edema, 1 0 cm thick acute subdural hematoma along right frontal cerebral convexity with some extension of blood products into the right parafalcine and right tentorium cerebelli, Trace intraventricular hemorrhage in the occipital horn of left lateral ventricle  Plan:  - neurosurgery consulted  - CTH head this am with worsening bleeding   - Discussed with NSGY and family    - Discussed plan with family- Dtr-in law and Son, Other Dtr has been in contact with Family and they all agree that they would like to pursue comfort care  They did have a conversation with the patient when she was cognitively intact regarding goals of care and this is consistent with her wishes  Disposition: waiting hospice placement    SUBJECTIVE:    Subjective: No acute events overnight  Resting comfortably this morning  OBJECTIVE:   Vitals:   HR:  [] 116  Resp:  [16-36] 36    Intake/Output:  I/O       09/12 0701  09/13 0700 09/13 0701  09/14 0700    IV Piggyback  100    Total Intake  100    Net  +100               Nutrition: Diet Regular; Pleasure Feed  GI Proph/Bowel Reg: dulcolax  VTE Prophylaxis:Reason for no pharmacologic prophylaxis comfort care     Physical Exam:  General: No acute distress  Neuro: dementia, at baseline  CV: Well perfused, regular rate and rhythm  Lungs: Normal work of breathing, no increased respiratory effort  Abdomen: Soft, non-tender, non-distended  Extremities: No edema, clubbing or cyanosis  Skin: Warm, dry      Invasive Devices  Report    Peripheral Intravenous Line  Duration           Peripheral IV 09/08/22 Dorsal (posterior); Left Hand 5 days

## 2022-09-14 NOTE — ASSESSMENT & PLAN NOTE
9/8 Xray left humerus: Findings indeterminate for a small nondisplaced periprosthetic fracture along the proximal aspect of the humerus      Plan:  - comfort care

## 2022-09-15 NOTE — PROGRESS NOTES
[address] [facility]  Progress Note Ar Casillas 8/29/1926, 80 y o  female MRN: [id number]  Unit/Bed#: [facility] 621-01 Encounter: [id number]  Primary Care Provider: Nicolle Watson DO   Date and time admitted to hospital: 9/8/2022  6:49 PM    Dens fracture [facility])  Assessment & Plan  9/8 CT cervical spine: New acute C2 fracture involving base of dens  Plan:  - comfort care    Periprosthetic fracture of shoulder  Assessment & Plan  9/8 Xray left humerus: Findings indeterminate for a small nondisplaced periprosthetic fracture along the proximal aspect of the humerus  Plan:  - comfort care    Fall  Assessment & Plan  Witnessed fall  Struck her head on dining table  On plavix    Dementia ([facility] [address] )  Assessment & Plan  At baseline  Unable to provide history  - Patients family states she has been declining over the last few years  * Subdural hematoma, acute [facility])  Assessment & Plan  9/8 CT Head: 3 0 cm acute mixed-density hemorrhagic brain contusion of right anterior-inferior frontal lobe with moderate surrounding vasogenic edema, 1 0 cm thick acute subdural hematoma along right frontal cerebral convexity with some extension of blood products into the right parafalcine and right tentorium cerebelli, Trace intraventricular hemorrhage in the occipital horn of left lateral ventricle  Plan:  - neurosurgery consulted  - CTH head this am with worsening bleeding   - Discussed with NSGY and family  - Discussed plan with family- Dtr-in law and Son, Other Dtr has been in contact with Family and they all agree that they would like to pursue comfort care  They did have a conversation with the patient when she was cognitively intact regarding goals of care and this is consistent with her wishes  Disposition: awaiting hospice placement    SUBJECTIVE:  Chief Complaint: none    Subjective: No acute overnight events, resting comfortably this morning       OBJECTIVE:   Vitals:   HR: [64-72] 72  Resp:  [22-34] 24    Intake/Output:  I/O       09/13 0701  09/14 0700 09/14 0701  09/15 0700    P  O  0 0    IV Piggyback 100     Total Intake 100 0    Urine 0 0    Total Output 0 0    Net +100 0          Unmeasured Urine Occurrence 1 x          Nutrition: Diet Regular; Pleasure Feed  GI Proph/Bowel Reg: dulcolax  VTE Prophylaxis:Reason for no pharmacologic prophylaxis patient is comfort care     Physical Exam:   General: No acute distress  Neuro: resting comfortably, at mental baseline  HEENT: Moist mucus membranes  CV: Regular rate  Pulm: No respiratory distress  MSK: no edema  Skin: Warm and dry    Invasive Devices  Report    Peripheral Intravenous Line  Duration           Peripheral IV 09/08/22 Dorsal (posterior); Left Hand 6 days          Drain  Duration           External Urinary Catheter 1 day                      Lab Results: no new results  Imaging/EKG Studies: no new images   Other Studies: NA

## 2022-09-15 NOTE — CASE MANAGEMENT
Case Management Discharge Planning Note    Patient name Dorie Saxena  Location [address] 6[address]/[facility] 728-55 MRN [id number]  : 1926 Date 9/15/2022       Current Admission Date: 2022  Current Admission Diagnosis:Subdural hematoma, acute [facility])   Patient Active Problem List    Diagnosis Date Noted    Fall 2022    Periprosthetic fracture of shoulder 2022    Dens fracture ([place] [address] ) 2022    Subdural hematoma, acute ([place] [address] ) 2022    Severe episode of recurrent major depressive disorder, without psychotic features ([place] [address] ) 2021    Hypokalemia 2019    Fever 2019    Dementia ([place] [address] ) 2019    CKD (chronic kidney disease) stage 2, GFR 60-89 ml/min 2018    Closed 4-part fracture of proximal humerus, left, initial encounter 2018    Pre-op evaluation 2018    EKG, abnormal 2018    Essential hypertension 2018      LOS (days): 7  Geometric Mean LOS (GMLOS) (days): 4 60  Days to GMLOS:-2 2     OBJECTIVE:  Risk of Unplanned Readmission Score: 11 74         Current admission status: Inpatient   Preferred Pharmacy:   [facility]/pharmacy #[id number] [place] [address]  Phone: [phone] Fax: [phone]    Primary Care Provider: Gregorio Castro DO    Primary Insurance: MEDICARE  Secondary Insurance: [employer]    DISCHARGE DETAILS: TC to Debbie, [facility], informing her that patient was converted to liquid medications and is tolerating without difficulty  This CM again informed Debbie that the family would like patient to transition back to [facility] ASAP with hospice services  Debbie stated she will call the hospice agencies with whom her facility works with, and will get back to this CM as to whether patient can go back to facility tomorrow on hospice

## 2022-09-15 NOTE — PLAN OF CARE
Problem: MOBILITY - ADULT  Goal: Maintain or return to baseline ADL function  Description: INTERVENTIONS:  -  Assess patient's ability to carry out ADLs; assess patient's baseline for ADL function and identify physical deficits which impact ability to perform ADLs (bathing, care of mouth/teeth, toileting, grooming, dressing, etc )  - Assess/evaluate cause of self-care deficits   - Assess range of motion  - Assess patient's mobility; develop plan if impaired  - Assess patient's need for assistive devices and provide as appropriate  - Encourage maximum independence but intervene and supervise when necessary  - Involve family in performance of ADLs  - Assess for home care needs following discharge   - Consider OT consult to assist with ADL evaluation and planning for discharge  - Provide patient education as appropriate  Outcome: Progressing  Goal: Maintains/Returns to pre admission functional level  Description: INTERVENTIONS:  - Perform BMAT or MOVE assessment daily    - Set and communicate daily mobility goal to care team and patient/family/caregiver  - Collaborate with rehabilitation services on mobility goals if consulted  - Perform Range of Motion 3 times a day  - Reposition patient every 2 hours    - Dangle patient 3 times a day  - Stand patient 3 times a day  - Ambulate patient 3 times a day  - Out of bed to chair 3 times a day   - Out of bed for meals 3 times a day  - Out of bed for toileting  - Record patient progress and toleration of activity level   Outcome: Progressing     Problem: PAIN - ADULT  Goal: Verbalizes/displays adequate comfort level or baseline comfort level  Description: Interventions:  - Encourage patient to monitor pain and request assistance  - Assess pain using appropriate pain scale  - Administer analgesics based on type and severity of pain and evaluate response  - Implement non-pharmacological measures as appropriate and evaluate response  - Consider cultural and social influences on pain and pain management  - Notify physician/advanced practitioner if interventions unsuccessful or patient reports new pain  Outcome: Progressing     Problem: INFECTION - ADULT  Goal: Absence or prevention of progression during hospitalization  Description: INTERVENTIONS:  - Assess and monitor for signs and symptoms of infection  - Monitor lab/diagnostic results  - Monitor all insertion sites, i e  indwelling lines, tubes, and drains  - Monitor endotracheal if appropriate and nasal secretions for changes in amount and color  - [place] appropriate cooling/warming therapies per order  - Administer medications as ordered  - Instruct and encourage patient and family to use good hand hygiene technique  - Identify and instruct in appropriate isolation precautions for identified infection/condition  Outcome: Progressing  Goal: Absence of fever/infection during neutropenic period  Description: INTERVENTIONS:  - Monitor WBC    Outcome: Progressing     Problem: SAFETY ADULT  Goal: Maintain or return to baseline ADL function  Description: INTERVENTIONS:  -  Assess patient's ability to carry out ADLs; assess patient's baseline for ADL function and identify physical deficits which impact ability to perform ADLs (bathing, care of mouth/teeth, toileting, grooming, dressing, etc )  - Assess/evaluate cause of self-care deficits   - Assess range of motion  - Assess patient's mobility; develop plan if impaired  - Assess patient's need for assistive devices and provide as appropriate  - Encourage maximum independence but intervene and supervise when necessary  - Involve family in performance of ADLs  - Assess for home care needs following discharge   - Consider OT consult to assist with ADL evaluation and planning for discharge  - Provide patient education as appropriate  Outcome: Progressing  Goal: Maintains/Returns to pre admission functional level  Description: INTERVENTIONS:  - Perform BMAT or MOVE assessment daily    - Set and communicate daily mobility goal to care team and patient/family/caregiver  - Collaborate with rehabilitation services on mobility goals if consulted  - Perform Range of Motion 3 times a day  - Reposition patient every 2 hours    - Dangle patient 3 times a day  - Stand patient 3 times a day  - Ambulate patient 3 times a day  - Out of bed to chair 3 times a day   - Out of bed for meals 3 times a day  - Out of bed for toileting  - Record patient progress and toleration of activity level   Outcome: Progressing  Goal: Patient will remain free of falls  Description: INTERVENTIONS:  - Educate patient/family on patient safety including physical limitations  - Instruct patient to call for assistance with activity   - Consult OT/PT to assist with strengthening/mobility   - Keep Call bell within reach  - Keep bed low and locked with side rails adjusted as appropriate  - Keep care items and personal belongings within reach  - Initiate and maintain comfort rounds  - Make Fall Risk Sign visible to staff  - Offer Toileting every 2 Hours, in advance of need  - Initiate/Maintain *bed/chair **alarm  - Obtain necessary fall risk management equipment:   - Apply yellow socks and bracelet for high fall risk patients  - Consider moving patient to room near nurses station  Outcome: Progressing     Problem: DISCHARGE PLANNING  Goal: Discharge to home or other facility with appropriate resources  Description: INTERVENTIONS:  - Identify barriers to discharge w/patient and caregiver  - Arrange for needed discharge resources and transportation as appropriate  - Identify discharge learning needs (meds, wound care, etc )  - Arrange for interpretive services to assist at discharge as needed  - Refer to Case Management Department for coordinating discharge planning if the patient needs post-hospital services based on physician/advanced practitioner order or complex needs related to functional status, cognitive ability, or social support system  Outcome: Progressing     Problem: Knowledge Deficit  Goal: Patient/family/caregiver demonstrates understanding of disease process, treatment plan, medications, and discharge instructions  Description: Complete learning assessment and assess knowledge base    Interventions:  - Provide teaching at level of understanding  - Provide teaching via preferred learning methods  Outcome: Progressing     Problem: Potential for Falls  Goal: Patient will remain free of falls  Description: INTERVENTIONS:  - Educate patient/family on patient safety including physical limitations  - Instruct patient to call for assistance with activity   - Consult OT/PT to assist with strengthening/mobility   - Keep Call bell within reach  - Keep bed low and locked with side rails adjusted as appropriate  - Keep care items and personal belongings within reach  - Initiate and maintain comfort rounds  - Make Fall Risk Sign visible to staff  - Offer Toileting every 2 Hours, in advance of need  - Initiate/Maintain *bed/chair **alarm  - Obtain necessary fall risk management equipment:   - Apply yellow socks and bracelet for high fall risk patients  - Consider moving patient to room near nurses station  Outcome: Progressing     Problem: Prexisting or High Potential for Compromised Skin Integrity  Goal: Skin integrity is maintained or improved  Description: INTERVENTIONS:  - Identify patients at risk for skin breakdown  - Assess and monitor skin integrity  - Assess and monitor nutrition and hydration status  - Monitor labs   - Assess for incontinence   - Turn and reposition patient  - Assist with mobility/ambulation  - Relieve pressure over bony prominences  - Avoid friction and shearing  - Provide appropriate hygiene as needed including keeping skin clean and dry  - Evaluate need for skin moisturizer/barrier cream  - Collaborate with interdisciplinary team   - Patient/family teaching  - Consider wound care consult   Outcome: Progressing Problem: Nutrition/Hydration-ADULT  Goal: Nutrient/Hydration intake appropriate for improving, restoring or maintaining nutritional needs  Description: Monitor and assess patient's nutrition/hydration status for malnutrition  Collaborate with interdisciplinary team and initiate plan and interventions as ordered  Monitor patient's weight and dietary intake as ordered or per policy  Utilize nutrition screening tool and intervene as necessary  Determine patient's food preferences and provide high-protein, high-caloric foods as appropriate  INTERVENTIONS:  - Monitor oral intake, urinary output, labs, and treatment plans  - Assess nutrition and hydration status and recommend course of action  - Evaluate amount of meals eaten  - Assist patient with eating if necessary   - Allow adequate time for meals  - Recommend/ encourage appropriate diets, oral nutritional supplements, and vitamin/mineral supplements  - Order, calculate, and assess calorie counts as needed  - Recommend, monitor, and adjust tube feedings and TPN/PPN based on assessed needs  - Assess need for intravenous fluids  - Provide specific nutrition/hydration education as appropriate  - Include patient/family/caregiver in decisions related to nutrition  Outcome: Progressing     Problem: Nutrition/Hydration-ADULT  Goal: Nutrient/Hydration intake appropriate for improving, restoring or maintaining nutritional needs  Description: Monitor and assess patient's nutrition/hydration status for malnutrition  Collaborate with interdisciplinary team and initiate plan and interventions as ordered  Monitor patient's weight and dietary intake as ordered or per policy  Utilize nutrition screening tool and intervene as necessary  Determine patient's food preferences and provide high-protein, high-caloric foods as appropriate       INTERVENTIONS:  - Monitor oral intake, urinary output, labs, and treatment plans  - Assess nutrition and hydration status and recommend course of action  - Evaluate amount of meals eaten  - Assist patient with eating if necessary   - Allow adequate time for meals  - Recommend/ encourage appropriate diets, oral nutritional supplements, and vitamin/mineral supplements  - Order, calculate, and assess calorie counts as needed  - Recommend, monitor, and adjust tube feedings and TPN/PPN based on assessed needs  - Assess need for intravenous fluids  - Provide specific nutrition/hydration education as appropriate  - Include patient/family/caregiver in decisions related to nutrition  Outcome: Progressing     Problem: DEATH & DYING  Goal: Pt/Family communicate acceptance of impending death and expresses psychological comfort and peace  Description: INTERVENTIONS:  - Assess patient/family anxiety and grief process related to end of life issues  - Provide emotional, spiritual and psychosocial support  - Provide information about the patients health status with consideration of family and cultural values  - Communicate willingness to discuss death and facilitate grief process  with patient/family as appropriate  - Emphasize sustaining relationships within family system and community, or [place]/spiritual traditions  - Initiate Spiritual Care, Pastoral care or other ancillary consults as needed  - Refer to community support groups as appropriate  Outcome: Progressing     Problem: SPIRITUAL CARE  Goal: Pt/Family able to move forward in process of forgiving self, others and/or higher power  Description: INTERVENTIONS:  - Assist patient with any spiritual needs/requests such as communion, confession, anointing, etc  - Explore guilt and help patient/family identify possible spiritual/cultural beliefs and values  - Explore possibilities of making amends & reconciliation with self, others, and/or a greater power  - Guide patient/family in identifying painful feelings  - Help patient explore and identify spiritual beliefs, cultural understandings or values that may help or hinder letting go of issue  - Help patient explore feelings of anger, bitterness, resentment, anxiety   Help patient/family identify and examine the situation in which these feelings are experienced  - Help patient/family identify destructive displacement of feelings onto other individuals  - Refer patient to formal counseling and/or to [place] community for further support as needed or per request  Outcome: Progressing  Goal: Patient feels balance and connection with others and/or higher power that empowers the self during times of loss, guilt and fear  Description: INTERVENTIONS:  - Create safety for patient through empathic presence and non-judgmental listening  - Encourage patient to explore his/her values, beliefs and/or spiritual images and practices  - Encourage use of breath work, imagery, meditation, relaxation, reiki to ease distress and provide healing  - Encourage use of cultural and spiritual celebrations and rituals  - Facilitate discussion that helps patient sort out spiritual concerns  - Help patient identify where meaning/hope/comfort & strength are in his/her life  - Refer patient to [place] community for assistance, as appropriate  - Respond to patient/family need for prayer/ritual/sacrament/ceremony  Outcome: Progressing

## 2022-09-15 NOTE — CASE MANAGEMENT
Case Management Discharge Planning Note    Patient name Alyson Padilla  Location [address] 621/PPHP 413-71 MRN [id number]  : 1926 Date 9/15/2022       Current Admission Date: 2022  Current Admission Diagnosis:Subdural hematoma, acute [facility])   Patient Active Problem List    Diagnosis Date Noted    Fall 2022    Periprosthetic fracture of shoulder 2022    Dens fracture ([facility] ) 2022    Subdural hematoma, acute ([place] [address] ) 2022    Severe episode of recurrent major depressive disorder, without psychotic features ([place] [address] ) 2021    Hypokalemia 2019    Fever 2019    Dementia ([place] [address] ) 2019    CKD (chronic kidney disease) stage 2, GFR 60-89 ml/min 2018    Closed 4-part fracture of proximal humerus, left, initial encounter 2018    Pre-op evaluation 2018    EKG, abnormal 2018    Essential hypertension 2018      LOS (days): 7  Geometric Mean LOS (GMLOS) (days): 4 60  Days to GMLOS:-2 3     OBJECTIVE:  Risk of Unplanned Readmission Score: 11 74         Current admission status: Inpatient   Preferred Pharmacy:   [facility]/pharmacy #[id number]  Phone: [phone] Fax: [phone]    Primary Care Provider: Kerry Gómez DO    Primary Insurance: MEDICARE  Secondary Insurance: Xenia Ko    DISCHARGE DETAILS: [facility] hospice RN, Chito Richardson, in to evaluate patient  Chito Richardson is requesting that this CM request S transportation for patient to return to [facility] tomorrow with  around 1100  Transportation submitted via roundtrip  Chito Richardson states she will come to the hospital again tomorrow am to evaluate patient prior to DC  Chito Richardson is requesting Morphine 20 mg/ml concentrate, give 0 25 ml by mouth every hour as needed for pain or shortness of breath    Lorazepam 2mg/ ml concentrate, give 0 25 ml by mouth TID ARTC and q 6 hours as needed for anxiety or agitation, be called into [facility] pharmacy tomorrow am prior to patient leaving hospital so that family can get the medication and provide it to [facility] when patient arrives

## 2022-09-15 NOTE — PLAN OF CARE
Problem: MOBILITY - ADULT  Goal: Maintain or return to baseline ADL function  Description: INTERVENTIONS:  -  Assess patient's ability to carry out ADLs; assess patient's baseline for ADL function and identify physical deficits which impact ability to perform ADLs (bathing, care of mouth/teeth, toileting, grooming, dressing, etc )  - Assess/evaluate cause of self-care deficits   - Assess range of motion  - Assess patient's mobility; develop plan if impaired  - Assess patient's need for assistive devices and provide as appropriate  - Encourage maximum independence but intervene and supervise when necessary  - Involve family in performance of ADLs  - Assess for home care needs following discharge   - Consider OT consult to assist with ADL evaluation and planning for discharge  - Provide patient education as appropriate  Outcome: Progressing  Goal: Maintains/Returns to pre admission functional level  Description: INTERVENTIONS:  - Perform BMAT or MOVE assessment daily    - Set and communicate daily mobility goal to care team and patient/family/caregiver  - Collaborate with rehabilitation services on mobility goals if consulted  - Perform Range of Motion 3 times a day  - Reposition patient every 2 hours    - Dangle patient 3 times a day  - Stand patient 3 times a day  - Ambulate patient 3 times a day  - Out of bed to chair 3 times a day   - Out of bed for meals 3 times a day  - Out of bed for toileting  - Record patient progress and toleration of activity level   Outcome: Progressing     Problem: PAIN - ADULT  Goal: Verbalizes/displays adequate comfort level or baseline comfort level  Description: Interventions:  - Encourage patient to monitor pain and request assistance  - Assess pain using appropriate pain scale  - Administer analgesics based on type and severity of pain and evaluate response  - Implement non-pharmacological measures as appropriate and evaluate response  - Consider cultural and social influences on pain and pain management  - Notify physician/advanced practitioner if interventions unsuccessful or patient reports new pain  Outcome: Progressing     Problem: INFECTION - ADULT  Goal: Absence or prevention of progression during hospitalization  Description: INTERVENTIONS:  - Assess and monitor for signs and symptoms of infection  - Monitor lab/diagnostic results  - Monitor all insertion sites, i e  indwelling lines, tubes, and drains  - Monitor endotracheal if appropriate and nasal secretions for changes in amount and color  - [place] appropriate cooling/warming therapies per order  - Administer medications as ordered  - Instruct and encourage patient and family to use good hand hygiene technique  - Identify and instruct in appropriate isolation precautions for identified infection/condition  Outcome: Progressing  Goal: Absence of fever/infection during neutropenic period  Description: INTERVENTIONS:  - Monitor WBC    Outcome: Progressing     Problem: SAFETY ADULT  Goal: Maintain or return to baseline ADL function  Description: INTERVENTIONS:  -  Assess patient's ability to carry out ADLs; assess patient's baseline for ADL function and identify physical deficits which impact ability to perform ADLs (bathing, care of mouth/teeth, toileting, grooming, dressing, etc )  - Assess/evaluate cause of self-care deficits   - Assess range of motion  - Assess patient's mobility; develop plan if impaired  - Assess patient's need for assistive devices and provide as appropriate  - Encourage maximum independence but intervene and supervise when necessary  - Involve family in performance of ADLs  - Assess for home care needs following discharge   - Consider OT consult to assist with ADL evaluation and planning for discharge  - Provide patient education as appropriate  Outcome: Progressing  Goal: Maintains/Returns to pre admission functional level  Description: INTERVENTIONS:  - Perform BMAT or MOVE assessment daily    - Set and communicate daily mobility goal to care team and patient/family/caregiver  - Collaborate with rehabilitation services on mobility goals if consulted  - Perform Range of Motion 3 times a day  - Reposition patient every 2 hours    - Dangle patient 3 times a day  - Stand patient 3 times a day  - Ambulate patient 3 times a day  - Out of bed to chair 3 times a day   - Out of bed for meals 3 times a day  - Out of bed for toileting  - Record patient progress and toleration of activity level   Outcome: Progressing  Goal: Patient will remain free of falls  Description: INTERVENTIONS:  - Educate patient/family on patient safety including physical limitations  - Instruct patient to call for assistance with activity   - Consult OT/PT to assist with strengthening/mobility   - Keep Call bell within reach  - Keep bed low and locked with side rails adjusted as appropriate  - Keep care items and personal belongings within reach  - Initiate and maintain comfort rounds  - Make Fall Risk Sign visible to staff  - Offer Toileting every 2 Hours, in advance of need  - Initiate/Maintain bed alarm  - Obtain necessary fall risk management equipment  - Apply yellow socks and bracelet for high fall risk patients  - Consider moving patient to room near nurses station  Outcome: Progressing     Problem: DISCHARGE PLANNING  Goal: Discharge to home or other facility with appropriate resources  Description: INTERVENTIONS:  - Identify barriers to discharge w/patient and caregiver  - Arrange for needed discharge resources and transportation as appropriate  - Identify discharge learning needs (meds, wound care, etc )  - Arrange for interpretive services to assist at discharge as needed  - Refer to Case Management Department for coordinating discharge planning if the patient needs post-hospital services based on physician/advanced practitioner order or complex needs related to functional status, cognitive ability, or social support system  Outcome: Progressing     Problem: Knowledge Deficit  Goal: Patient/family/caregiver demonstrates understanding of disease process, treatment plan, medications, and discharge instructions  Description: Complete learning assessment and assess knowledge base    Interventions:  - Provide teaching at level of understanding  - Provide teaching via preferred learning methods  Outcome: Progressing     Problem: Potential for Falls  Goal: Patient will remain free of falls  Description: INTERVENTIONS:  - Educate patient/family on patient safety including physical limitations  - Instruct patient to call for assistance with activity   - Consult OT/PT to assist with strengthening/mobility   - Keep Call bell within reach  - Keep bed low and locked with side rails adjusted as appropriate  - Keep care items and personal belongings within reach  - Initiate and maintain comfort rounds  - Make Fall Risk Sign visible to staff  - Offer Toileting every 2 Hours, in advance of need  - Initiate/Maintain bed alarm  - Obtain necessary fall risk management equipment  - Apply yellow socks and bracelet for high fall risk patients  - Consider moving patient to room near nurses station  Outcome: Progressing     Problem: Prexisting or High Potential for Compromised Skin Integrity  Goal: Skin integrity is maintained or improved  Description: INTERVENTIONS:  - Identify patients at risk for skin breakdown  - Assess and monitor skin integrity  - Assess and monitor nutrition and hydration status  - Monitor labs   - Assess for incontinence   - Turn and reposition patient  - Assist with mobility/ambulation  - Relieve pressure over bony prominences  - Avoid friction and shearing  - Provide appropriate hygiene as needed including keeping skin clean and dry  - Evaluate need for skin moisturizer/barrier cream  - Collaborate with interdisciplinary team   - Patient/family teaching  - Consider wound care consult   Outcome: Progressing     Problem: Nutrition/Hydration-ADULT  Goal: Nutrient/Hydration intake appropriate for improving, restoring or maintaining nutritional needs  Description: Monitor and assess patient's nutrition/hydration status for malnutrition  Collaborate with interdisciplinary team and initiate plan and interventions as ordered  Monitor patient's weight and dietary intake as ordered or per policy  Utilize nutrition screening tool and intervene as necessary  Determine patient's food preferences and provide high-protein, high-caloric foods as appropriate       INTERVENTIONS:  - Monitor oral intake, urinary output, labs, and treatment plans  - Assess nutrition and hydration status and recommend course of action  - Evaluate amount of meals eaten  - Assist patient with eating if necessary   - Allow adequate time for meals  - Recommend/ encourage appropriate diets, oral nutritional supplements, and vitamin/mineral supplements  - Order, calculate, and assess calorie counts as needed  - Recommend, monitor, and adjust tube feedings and TPN/PPN based on assessed needs  - Assess need for intravenous fluids  - Provide specific nutrition/hydration education as appropriate  - Include patient/family/caregiver in decisions related to nutrition  Outcome: Progressing

## 2022-09-16 NOTE — ASSESSMENT & PLAN NOTE
S/p fall  9/8 CT cervical spine: New acute C2 fracture involving base of dens      Plan:  - comfort care

## 2022-09-16 NOTE — ASSESSMENT & PLAN NOTE
S/p fall  9/8 Xray left humerus: Findings indeterminate for a small nondisplaced periprosthetic fracture along the proximal aspect of the humerus      Plan:  - comfort care

## 2022-09-16 NOTE — DEATH NOTE
INPATIENT DEATH NOTE  Jerome Soria 80 y o  female MRN: [id number]  Unit/Bed#: [address] 621-01 Encounter: [id number]       Patient's Information  Pronounced by: Alycia Guevara  Did the patient's death occur in the ED?: No  Did the patient's death occur in the OR?: No  Did the patient's death occur less than 10 days post-op?: No  Did the patient's death occur within 24 hours of admission?: No  Was code status DNR at the time of death?: Yes (level 4 comfort)    PHYSICAL EXAM:  Unresponsive to noxious stimuli, Spontaneous respirations absent, Breath sounds absent, Carotid pulse absent, Heart sounds absent, Pupillary light reflex absent, Corneal blink reflex absent and gag reflex absent    Medical Examiner notification criteria:  Any type of trauma   Medical Examiner's office notified?:  Yes    Medical Examiner accepted case?:  Yes  Name of Medical Examiner: [address] [facility]  [address] Notification  Was the family notified?: Yes  Date Notified: 22  Time Notified: 1447  Notified by: Alycia Guevara  Name of Family Notified of Death: Ovi Gentleman (daughter in law)   Relationship to Patient: Daughter-in-law  Family Notification Route: Telephone  Was the family told to contact a  home?: Yes  Name of  Home[de-identified] [facility]  home    Autopsy Options:  Post-mortem examination declined by next of kin    Primary Service Attending Physician notified?:  yes - Attending:  Dr Medina Bowling  Physician/Resident responsible for completing Discharge Summary:  MD Timmy Martin  [place]

## 2022-09-16 NOTE — ASSESSMENT & PLAN NOTE
S/p fall  9/8 CT Head: 3 0 cm acute mixed-density hemorrhagic brain contusion of right anterior-inferior frontal lobe with moderate surrounding vasogenic edema, 1 0 cm thick acute subdural hematoma along right frontal cerebral convexity with some extension of blood products into the right parafalcine and right tentorium cerebelli, Trace intraventricular hemorrhage in the occipital horn of left lateral ventricle  Plan:  - neurosurgery consulted  - CTH head this am with worsening bleeding   - Discussed with NSGY and family  - Discussed plan with family- Dtr-in law and Son, Other Dtr has been in contact with Family and they all agree that they would like to pursue comfort care  They did have a conversation with the patient when she was cognitively intact regarding goals of care and this is consistent with her wishes

## 2022-09-16 NOTE — DISCHARGE SUMMARY
[address] [facility]  Discharge- Emperatriz Sarmiento 8/29/1926, 80 y o  female MRN: [id number]  Unit/Bed#: [facility] 621-01 Encounter: [id number]  Primary Care Provider: Juanita Wade DO   Date and time admitted to hospital: 9/8/2022  6:49 PM    Dens fracture [facility])  Assessment & Plan  S/p fall  9/8 CT cervical spine: New acute C2 fracture involving base of dens  Plan:  - comfort care    Periprosthetic fracture of shoulder  Assessment & Plan  S/p fall  9/8 Xray left humerus: Findings indeterminate for a small nondisplaced periprosthetic fracture along the proximal aspect of the humerus  Plan:  - comfort care    Fall  Assessment & Plan  Witnessed fall  Struck her head on dining table  On plavix    Dementia ([facility] [address] )  Assessment & Plan  At baseline  Unable to provide history  - Patients family states she has been declining over the last few years  * Subdural hematoma, acute ([address] )  Assessment & Plan  S/p fall  9/8 CT Head: 3 0 cm acute mixed-density hemorrhagic brain contusion of right anterior-inferior frontal lobe with moderate surrounding vasogenic edema, 1 0 cm thick acute subdural hematoma along right frontal cerebral convexity with some extension of blood products into the right parafalcine and right tentorium cerebelli, Trace intraventricular hemorrhage in the occipital horn of left lateral ventricle  Plan:  - neurosurgery consulted  - CTH head this am with worsening bleeding   - Discussed with NSGY and family  - Discussed plan with family- Dtr-in law and Son, Other Dtr has been in contact with Family and they all agree that they would like to pursue comfort care  They did have a conversation with the patient when she was cognitively intact regarding goals of care and this is consistent with her wishes                   Medical Problems             Resolved Problems  Date Reviewed: 1/27/2021   None                 Admission Date:   Admission Orders (From admission, onward) Ordered        22  Inpatient Admission  Once                        Admitting Diagnosis: Dens fracture ([facility] [address] ) [Y88 494L]  Subdural hematoma, acute ([facility] [address] ) [S06 5X9A]  Fall, initial encounter [W19  XXXA]  Unspecified multiple injuries, initial encounter [T07  XXXA]  Periprosthetic fracture of shoulder [id number]  9XXA]  Late onset Alzheimer's dementia without behavioral disturbance ([facility] [address] ) [G30 1, F02 80]    HPI: Sameer Wallace is a 80 y o  female who presents as a transfer from [facility] after sustaining a witnessed fall  She is on Plavix  The patient was unable to provide further history given her dementia  She was transferred to [facility] for escalation of care for her neurological injuries  Procedures Performed: No orders of the defined types were placed in this encounter  Summary of Hospital Course: Ms Ketty Lerma sustained many injuries after her fall, including a periprosthetic humerus fracture, C2 fracture involving the dens, and subdural hematomas  Given the patient's history of dementia and declining health status, the family made the difficult decision to change the patient's code status to comfort care  While the patient was awaiting placement and transfer to a hospice facility, she  on  at 4:13AM  Family was notified  Significant Findings, Care, Treatment and Services Provided: none    Complications: none     Condition at Discharge: death         Discharge instructions/Information to patient and family:   See after visit summary for information provided to patient and family  Provisions for Follow-Up Care:  See after visit summary for information related to follow-up care and any pertinent home health orders  PCP: Iona Barros DO    Disposition: [facility]    Planned Readmission: No    Discharge Statement   I spent 20 minutes discharging the patient  This time was spent on the day of discharge  I had direct contact with the patient on the day of discharge   Additional documentation is required if more than 30 minutes were spent on discharge  Discharge Medications:  See after visit summary for reconciled discharge medications provided to patient and family

## (undated) DEVICE — SUT VICRYL 2-0 CT-1 27 IN J259H

## (undated) DEVICE — THE SIMPULSE SOLO SYSTEM WITH ULTREX RETRACTABLE SPLASH SHIELD TIP: Brand: SIMPULSE SOLO

## (undated) DEVICE — BETHLEHEM UNIV MAJOR ORTHO,KIT: Brand: CARDINAL HEALTH

## (undated) DEVICE — CURITY NON-ADHERENT STRIPS: Brand: CURITY

## (undated) DEVICE — ARTHROSCOPY FLOOR MAT

## (undated) DEVICE — SCD SEQUENTIAL COMPRESSION COMFORT SLEEVE MEDIUM KNEE LENGTH: Brand: KENDALL SCD

## (undated) DEVICE — PROXIMATE SKIN STAPLERS (35 WIDE) CONTAINS 35 STAINLESS STEEL STAPLES (FIXED HEAD): Brand: PROXIMATE

## (undated) DEVICE — POSITIONER BEACH CHAIR FOAM KIT

## (undated) DEVICE — GUIDE PIN 2.5 X 220MM AEQUALIS PERFORM PLUS

## (undated) DEVICE — HEAVY DUTY TABLE COVER: Brand: CONVERTORS

## (undated) DEVICE — DRESSING MEPILEX AG BORDER 4 X 8 IN

## (undated) DEVICE — CHLORAPREP HI-LITE 26ML ORANGE

## (undated) DEVICE — Device

## (undated) DEVICE — IMPERVIOUS STOCKINETTE: Brand: DEROYAL

## (undated) DEVICE — DRILL BIT 3.2MM

## (undated) DEVICE — HOOD: Brand: FLYTE, SURGICOOL

## (undated) DEVICE — SUT VICRYL 0 CT-1 27 IN J260H

## (undated) DEVICE — SUT FIBERWIRE #2 1/2 CIRCLE T-5 38IN AR-7200

## (undated) DEVICE — GLOVE INDICATOR PI UNDERGLOVE SZ 8.5 BLUE

## (undated) DEVICE — GLOVE SRG BIOGEL 8.5

## (undated) DEVICE — LIGHT HANDLE COVER SLEEVE DISP BLUE STELLAR

## (undated) DEVICE — 2108 SERIES SAGITTAL BLADE (18.6 X 0.64 X 61.1MM)

## (undated) DEVICE — INTENDED FOR TISSUE SEPARATION, AND OTHER PROCEDURES THAT REQUIRE A SHARP SURGICAL BLADE TO PUNCTURE OR CUT.: Brand: BARD-PARKER SAFETY BLADES SIZE 10, STERILE